# Patient Record
Sex: MALE | Race: WHITE | NOT HISPANIC OR LATINO | Employment: OTHER | URBAN - METROPOLITAN AREA
[De-identification: names, ages, dates, MRNs, and addresses within clinical notes are randomized per-mention and may not be internally consistent; named-entity substitution may affect disease eponyms.]

---

## 2021-04-09 ENCOUNTER — HOSPITAL ENCOUNTER (OUTPATIENT)
Dept: MEDSURG UNIT | Facility: HOSPITAL | Age: 81
End: 2021-04-12
Attending: INTERNAL MEDICINE | Admitting: INTERNAL MEDICINE

## 2021-04-20 ENCOUNTER — HISTORICAL (OUTPATIENT)
Dept: LAB | Facility: HOSPITAL | Age: 81
End: 2021-04-20

## 2022-01-29 ENCOUNTER — HISTORICAL (OUTPATIENT)
Dept: ADMINISTRATIVE | Facility: HOSPITAL | Age: 82
End: 2022-01-29

## 2022-01-29 ENCOUNTER — HOSPITAL ENCOUNTER (OUTPATIENT)
Dept: MEDSURG UNIT | Facility: HOSPITAL | Age: 82
End: 2022-01-30
Attending: INTERNAL MEDICINE | Admitting: INTERNAL MEDICINE

## 2022-01-29 LAB
ABS NEUT (OLG): 6.67 (ref 2.1–9.2)
ALBUMIN SERPL-MCNC: 3.9 G/DL (ref 3.4–4.8)
ALBUMIN/GLOB SERPL: 1.3 {RATIO} (ref 1.1–2)
ALP SERPL-CCNC: 83 U/L (ref 40–150)
ALT SERPL-CCNC: 82 U/L (ref 0–55)
APPEARANCE, UA: CLEAR
APTT PPP: 34.2 S (ref 23.2–33.7)
AST SERPL-CCNC: 56 U/L (ref 5–34)
BACTERIA SPEC CULT: NORMAL
BASOPHILS # BLD AUTO: 0.1 10*3/UL (ref 0–0.2)
BASOPHILS NFR BLD AUTO: 1 %
BILIRUB SERPL-MCNC: 0.7 MG/DL
BILIRUB UR QL STRIP: NEGATIVE
BILIRUBIN DIRECT+TOT PNL SERPL-MCNC: 0.3 (ref 0–0.5)
BILIRUBIN DIRECT+TOT PNL SERPL-MCNC: 0.4 (ref 0–0.8)
BUDDING YEAST: NORMAL
BUN SERPL-MCNC: 26.3 MG/DL (ref 8.4–25.7)
CALCIUM SERPL-MCNC: 9.4 MG/DL (ref 8.7–10.5)
CASTS, UA: NORMAL
CHLORIDE SERPL-SCNC: 90 MMOL/L (ref 98–107)
CO2 SERPL-SCNC: 25 MMOL/L (ref 23–31)
COLOR UR: YELLOW
CREAT SERPL-MCNC: 1.67 MG/DL (ref 0.73–1.18)
CRYSTALS: NORMAL
EOSINOPHIL # BLD AUTO: 0.2 10*3/UL (ref 0–0.9)
EOSINOPHIL NFR BLD AUTO: 2 %
ERYTHROCYTE [DISTWIDTH] IN BLOOD BY AUTOMATED COUNT: 14.6 % (ref 11.5–17)
GLOBULIN SER-MCNC: 3 G/DL (ref 2.4–3.5)
GLUCOSE (UA): NEGATIVE
GLUCOSE SERPL-MCNC: 133 MG/DL (ref 82–115)
HCT VFR BLD AUTO: 38.3 % (ref 42–52)
HEMOLYSIS INTERF INDEX SERPL-ACNC: 18
HGB BLD-MCNC: 12.8 G/DL (ref 14–18)
HGB UR QL STRIP: NEGATIVE
ICTERIC INTERF INDEX SERPL-ACNC: 0
INR PPP: 1.2 (ref 0–1.3)
KETONES UR QL STRIP: NEGATIVE
LEUKOCYTE ESTERASE UR QL STRIP: NEGATIVE
LIPEMIC INTERF INDEX SERPL-ACNC: 3
LYMPHOCYTES # BLD AUTO: 0.8 10*3/UL (ref 0.6–4.6)
LYMPHOCYTES NFR BLD AUTO: 10 %
MANUAL DIFF? (OHS): NO
MCH RBC QN AUTO: 29.5 PG (ref 27–31)
MCHC RBC AUTO-ENTMCNC: 33.4 G/DL (ref 33–36)
MCV RBC AUTO: 88.2 FL (ref 80–94)
MONOCYTES # BLD AUTO: 0.9 10*3/UL (ref 0.1–1.3)
MONOCYTES NFR BLD AUTO: 10 %
NEUTROPHILS # BLD AUTO: 6.67 10*3/UL (ref 2.1–9.2)
NEUTROPHILS NFR BLD AUTO: 77 %
NITRITE UR QL STRIP: NEGATIVE
OSMOLALITY SERPL: 277 MOSM/KG (ref 280–300)
OSMOLALITY UR: 229 MOSM/KG (ref 300–1300)
PH UR STRIP: 7 [PH] (ref 5–9)
PLATELET # BLD AUTO: 270 10*3/UL (ref 130–400)
PMV BLD AUTO: 9.6 FL (ref 9.4–12.4)
POTASSIUM SERPL-SCNC: 3.6 MMOL/L (ref 3.5–5.1)
PROT SERPL-MCNC: 6.9 G/DL (ref 5.8–7.6)
PROT UR QL STRIP: NEGATIVE
PROTHROMBIN TIME: 15.3 S (ref 12.5–14.5)
RBC # BLD AUTO: 4.34 10*6/UL (ref 4.7–6.1)
RBC #/AREA URNS HPF: NORMAL /[HPF] (ref 0–2)
SARS-COV-2 AG RESP QL IA.RAPID: NEGATIVE
SMALL ROUND CELLS, UA: NORMAL
SODIUM SERPL-SCNC: 127 MMOL/L (ref 136–145)
SP GR UR STRIP: 1.01 (ref 1–1.03)
SPERM URNS QL MICRO: NORMAL
SQUAMOUS EPITHELIAL, UA: NORMAL (ref 0–4)
TROPONIN I SERPL-MCNC: 0.01 NG/ML (ref 0–0.04)
UROBILINOGEN UR STRIP-ACNC: 0.2
WBC # SPEC AUTO: 8.7 10*3/UL (ref 4.5–11.5)
WBC #/AREA URNS HPF: NORMAL /[HPF] (ref 0–2)

## 2022-01-30 LAB
ABS NEUT (OLG): 4.74 (ref 2.1–9.2)
ALBUMIN SERPL-MCNC: 3.2 G/DL (ref 3.4–4.8)
ALBUMIN/GLOB SERPL: 1.1 {RATIO} (ref 1.1–2)
ALP SERPL-CCNC: 64 U/L (ref 40–150)
ALT SERPL-CCNC: 76 U/L (ref 0–55)
AST SERPL-CCNC: 57 U/L (ref 5–34)
BASOPHILS # BLD AUTO: 0 10*3/UL (ref 0–0.2)
BASOPHILS NFR BLD AUTO: 1 %
BILIRUB SERPL-MCNC: 0.6 MG/DL
BILIRUBIN DIRECT+TOT PNL SERPL-MCNC: 0.2 (ref 0–0.5)
BILIRUBIN DIRECT+TOT PNL SERPL-MCNC: 0.4 (ref 0–0.8)
BUN SERPL-MCNC: 18.9 MG/DL (ref 8.4–25.7)
CALCIUM SERPL-MCNC: 8.6 MG/DL (ref 8.7–10.5)
CHLORIDE SERPL-SCNC: 96 MMOL/L (ref 98–107)
CO2 SERPL-SCNC: 24 MMOL/L (ref 23–31)
CREAT SERPL-MCNC: 0.95 MG/DL (ref 0.73–1.18)
EOSINOPHIL # BLD AUTO: 0.1 10*3/UL (ref 0–0.9)
EOSINOPHIL NFR BLD AUTO: 2 %
ERYTHROCYTE [DISTWIDTH] IN BLOOD BY AUTOMATED COUNT: 14.8 % (ref 11.5–17)
GLOBULIN SER-MCNC: 3 G/DL (ref 2.4–3.5)
GLUCOSE SERPL-MCNC: 102 MG/DL (ref 82–115)
HCT VFR BLD AUTO: 34.8 % (ref 42–52)
HEMOLYSIS INTERF INDEX SERPL-ACNC: 152
HGB BLD-MCNC: 11.6 G/DL (ref 14–18)
ICTERIC INTERF INDEX SERPL-ACNC: 0
LIPEMIC INTERF INDEX SERPL-ACNC: 5
LYMPHOCYTES # BLD AUTO: 0.8 10*3/UL (ref 0.6–4.6)
LYMPHOCYTES NFR BLD AUTO: 13 %
MANUAL DIFF? (OHS): NO
MCH RBC QN AUTO: 29.6 PG (ref 27–31)
MCHC RBC AUTO-ENTMCNC: 33.3 G/DL (ref 33–36)
MCV RBC AUTO: 88.8 FL (ref 80–94)
MONOCYTES # BLD AUTO: 0.8 10*3/UL (ref 0.1–1.3)
MONOCYTES NFR BLD AUTO: 12 %
NEUTROPHILS # BLD AUTO: 4.74 10*3/UL (ref 2.1–9.2)
NEUTROPHILS NFR BLD AUTO: 72 %
PLATELET # BLD AUTO: 215 10*3/UL (ref 130–400)
PMV BLD AUTO: 9.6 FL (ref 9.4–12.4)
POTASSIUM SERPL-SCNC: 3.8 MMOL/L (ref 3.5–5.1)
PROT SERPL-MCNC: 6.2 G/DL (ref 5.8–7.6)
RBC # BLD AUTO: 3.92 10*6/UL (ref 4.7–6.1)
SODIUM SERPL-SCNC: 130 MMOL/L (ref 136–145)
WBC # SPEC AUTO: 6.6 10*3/UL (ref 4.5–11.5)

## 2022-03-10 ENCOUNTER — HISTORICAL (OUTPATIENT)
Dept: ADMINISTRATIVE | Facility: HOSPITAL | Age: 82
End: 2022-03-10

## 2022-03-10 LAB
ALBUMIN SERPL-MCNC: 3.5 G/DL (ref 3.4–4.8)
ALBUMIN/GLOB SERPL: 1.2 {RATIO} (ref 1.1–2)
ALP SERPL-CCNC: 83 U/L (ref 40–150)
ALT SERPL-CCNC: 40 U/L (ref 0–55)
AST SERPL-CCNC: 28 U/L (ref 5–34)
BILIRUB SERPL-MCNC: 0.6 MG/DL
BILIRUBIN DIRECT+TOT PNL SERPL-MCNC: 0.3 (ref 0–0.5)
BILIRUBIN DIRECT+TOT PNL SERPL-MCNC: 0.3 (ref 0–0.8)
BUN SERPL-MCNC: 10.9 MG/DL (ref 8.4–25.7)
CALCIUM SERPL-MCNC: 9.5 MG/DL (ref 8.7–10.5)
CHLORIDE SERPL-SCNC: 99 MMOL/L (ref 98–107)
CHOLEST SERPL-MCNC: 163 MG/DL
CHOLEST/HDLC SERPL: 3 {RATIO} (ref 0–5)
CO2 SERPL-SCNC: 27 MMOL/L (ref 23–31)
CREAT SERPL-MCNC: 0.89 MG/DL (ref 0.73–1.18)
ERYTHROCYTE [DISTWIDTH] IN BLOOD BY AUTOMATED COUNT: 16 % (ref 11.5–17)
GLOBULIN SER-MCNC: 2.8 G/DL (ref 2.4–3.5)
GLUCOSE SERPL-MCNC: 102 MG/DL (ref 82–115)
HCT VFR BLD AUTO: 35.7 % (ref 42–52)
HDLC SERPL-MCNC: 57 MG/DL (ref 35–60)
HEMOLYSIS INTERF INDEX SERPL-ACNC: 1
HEMOLYSIS INTERF INDEX SERPL-ACNC: 1
HGB BLD-MCNC: 12 G/DL (ref 14–18)
ICTERIC INTERF INDEX SERPL-ACNC: 1
INR PPP: 1.1 (ref 0–1.3)
LDLC SERPL CALC-MCNC: 96 MG/DL (ref 50–140)
LIPEMIC INTERF INDEX SERPL-ACNC: <0
MAGNESIUM SERPL-MCNC: 1.8 MG/DL (ref 1.6–2.6)
MCH RBC QN AUTO: 30.2 PG (ref 27–31)
MCHC RBC AUTO-ENTMCNC: 33.6 G/DL (ref 33–36)
MCV RBC AUTO: 89.9 FL (ref 80–94)
PLATELET # BLD AUTO: 257 10*3/UL (ref 130–400)
PMV BLD AUTO: 10.3 FL (ref 9.4–12.4)
POTASSIUM SERPL-SCNC: 4.2 MMOL/L (ref 3.5–5.1)
PROT SERPL-MCNC: 6.3 G/DL (ref 5.8–7.6)
PROTHROMBIN TIME: 13.6 S (ref 12.5–14.5)
RBC # BLD AUTO: 3.97 10*6/UL (ref 4.7–6.1)
SODIUM SERPL-SCNC: 136 MMOL/L (ref 136–145)
TRIGL SERPL-MCNC: 49 MG/DL (ref 34–140)
TSH SERPL-ACNC: 2.58 M[IU]/L (ref 0.35–4.94)
VLDLC SERPL CALC-MCNC: 10 MG/DL
WBC # SPEC AUTO: 6.8 10*3/UL (ref 4.5–11.5)

## 2022-03-11 ENCOUNTER — HISTORICAL (OUTPATIENT)
Dept: CARDIOLOGY | Facility: HOSPITAL | Age: 82
End: 2022-03-11

## 2022-04-07 ENCOUNTER — HISTORICAL (OUTPATIENT)
Dept: ADMINISTRATIVE | Facility: HOSPITAL | Age: 82
End: 2022-04-07
Payer: MEDICARE

## 2022-04-24 VITALS
HEIGHT: 67 IN | OXYGEN SATURATION: 96 % | WEIGHT: 187.56 LBS | BODY MASS INDEX: 29.44 KG/M2 | SYSTOLIC BLOOD PRESSURE: 172 MMHG | DIASTOLIC BLOOD PRESSURE: 67 MMHG

## 2022-04-29 NOTE — OP NOTE
DATE OF SURGERY:        SURGEON:  Rashard Serrato MD    PROCEDURE PERFORMED:  Limited transesophageal echocardiogram with direct current cardioversion.    PREOPERATIVE DIAGNOSIS:  Atrial fibrillation.    POSTOPERATIVE DIAGNOSIS:  Sinus rhythm.    PROCEDURAL INDICATION:  Poorly-tolerated atrial fibrillation.    PROCEDURE IN DETAIL:  The patient was taken to the postop recovery area and sedated under the supervision of Anesthesiology.  A WISAM probe was inserted into the esophagus, and a limited transesophageal echocardiogram was performed.  The left atrial appendage was well visualized with no evidence of thrombus formation.  There was mild spontaneous echo contrast within the left atrium.  The WISAM probe was removed and 200 joules of synchronous energy was delivered with prompt return of sinus rhythm.  There were no procedural complications, and the patient was scheduled for discharge upon recovery.        ______________________________  Rashard Serrato MD    AI/UR  DD:  04/12/2021  Time:  01:29PM  DT:  04/12/2021  Time:  01:40PM  Job #:  [id number]

## 2022-04-29 NOTE — H&P
Patient:   Garrison Garrett            MRN: [id number]            FIN: [id number]               Age:   80 years     Sex:  Male     :  1940   Associated Diagnoses:   None   Author:   Rashard Serrato MD      Basic Information   Source of history:  Self, Medical record.    Referral source:  Emergency department.    History limitation:  None.    Resusitation Status:  Full Code.       Chief Complaint   Shortness of Breath      History of Present Illness   Mr. Garrett is a 80 year old male, known to Dr. Fontanez, who presented to the ED with reports of shortness of breath. Patient reports 2-week history of worsening shortness of breath both at rest and with exertion. He reports intermittent PND and sleeps on one pillow nightly. Denies CP. CXR revealed pronounced pulmonary vasculature and small right pleural effusion. BNP noted to be 210. Initial troponin value is negative. Patient noted to have newly diagnosed AFIB presumptively contributing to his symptoms. He is scheduled for WISAM/DCCV with Dr. Layne on 4.15.21. He is admitted to [facility] for further workup of his symptoms.    PMH: Paroxysmal Atrial Fibrillation (New Diagnosis) (Eliquis), CAD, HTN, DLD, Former Cigar Smoker, ETOH Use (6-8 Beers Daily), Hard of Hearing  PSH: Sinus Surgery, LHC  FH: Mother- CVA/MI, Sister- MI  SH: Tobacco- Former Cigar Smoker, ETOH- 6-8 Beers Daily, Substance Abuse- Negative    Studies:  Cardiac PET (3.30.21):  This is probably a normal perfusion study.   This scan is suggestive of low risk for future cardiovascular events.   Small partially reversible perfusion abnormality of moderate intensity in the apical inferior segment. This could represent an area of attenuation. Clinical correlation is recommended.   The left ventricular cavity is noted to be normal on the stress studies. The stress left ventricular ejection fraction was calculated to be 69% and left ventricular global function is normal. The rest left  ventricular cavity is noted to be normal. The rest left ventricular ejection fraction was calculated to be 67% and rest left ventricular global function is normal.   When compared to the resting ejection fraction (67%), the stress ejection fraction (69%) has increased.   The study quality is good.      TTE (3.29.21):  The left ventricle is normal in size. Global left ventricular systolic function is normal. The left ventricular ejection fraction is 50%. Left ventricular diastolic function is indeterminate.   Mild to moderate (1-2+) mitral regurgitation.   Mild to moderate (1-2+) tricuspid regurgitation.   Mild (1+) aortic regurgitation.   Mild (1+) pulmonic regurgitation.   Aortic root diameter is 4.0 cms.  The pulmonary artery systolic pressure is 42 mmHg. Evidence of pulmonary hypertension is noted.      LHC (9.19.14):  LM- Patent Calcified Short Vessel, LAD- 30-40% Mid, LCx- Small 90% OM1 Branch, RCA- Normal (Non-Dominant)      Review of Systems   Constitutional:  Negative.    Eye:  Negative.    Ear/Nose/Mouth/Throat:  Negative.    Respiratory:  Shortness of breath, MARRERO.    Cardiovascular:  No chest pain.    Gastrointestinal:  No H/O GI Bleed.    Genitourinary:  Negative.    Hematology/Lymphatics:  Negative.    Endocrine:  Negative.    Immunologic:  Negative.    Musculoskeletal:  Negative.    Integumentary:  Negative.    Neurologic:  Alert and oriented X4.    Psychiatric:  Negative.       Health Status   Allergies:    Allergic Reactions (Selected)  Severity Not Documented  Hydrochlorothiazide- No reactions were documented.,    Allergies (1) Active Reaction  hydrochlorothiazide None Documented     Current medications:  (Selected)   Inpatient Medications  Ordered  Benadryl: 25 mg, form: Cap, Oral, Once a day (at bedtime) PRN for insomnia, first dose 04/09/21 15:20:00 CDT, STAT  Benadryl: 25 mg, form: Cap, Oral, q4hr PRN for itching, first dose 04/09/21 15:20:00 CDT, STAT  Lasix: 40 mg, form: Injection, IV Push,  Once, first dose 04/09/21 15:05:00 CDT, stop date 04/09/21 15:05:00 CDT, STAT  Lasix: 40 mg, form: Injection, IV Push, Once, first dose 04/09/21 15:18:00 CDT, stop date 04/09/21 15:18:00 CDT, STAT  Maalox Antacid with Anti-Gas: 30 mL, form: Susp, Oral, q4hr PRN for dyspepsia, first dose 04/09/21 15:20:00 CDT  Milk of Magnesia: 30 mL, form: Susp, Oral, Daily PRN for constipation, first dose 04/09/21 15:20:00 CDT  Norco  5 mg-325 mg oral tablet: 1 tab(s), form: Tab, Oral, q4hr PRN for pain, moderate, first dose 04/09/21 15:20:00 CDT  Norco  5 mg-325 mg oral tablet: 2 tab(s), form: Tab, Oral, q4hr PRN for pain, severe, first dose 04/09/21 15:20:00 CDT  Senokot S: 1 tab(s), form: Tab, Oral, BID PRN for constipation, first dose 04/09/21 15:20:00 CDT, choose only if unrelieved by Milk of Magnesia or choose first if ordered alone  Tylenol: 1,000 mg, form: Tab, Oral, q6hr PRN for pain, mild, first dose 04/09/21 15:20:00 CDT, STAT, or fever; No more than 4 grams in 24 hours  Tylenol: 650 mg, form: Supp, IN, q6hr PRN for pain, first dose 04/09/21 15:20:00 CDT, STAT, mild or fever if patient unable to swallow; No more than 4 grams in 24 hours  Xanax: 0.25 mg, form: Tab, Oral, q6hr PRN for anxiety, first dose 04/09/21 15:20:00 CDT, STAT  Xylocaine Jelly 2% topical gel with applicator: 5 mL, form: Gel, TOP, Once PRN for other (see comment), first dose 04/09/21 15:20:00 CDT, for insertion of urinary catheter in males  hydrALAZINE: 10 mg, form: Injection, IV Push, q2hr PRN for hypertension, first dose 04/09/21 15:20:00 CDT, STAT, SBP > 160mmHg or DBP > 100 mmHg, if HR < 60 or when BP does not respond to Labetolol  labetalol: 10 mg, form: Soln, IV Push, q1hr PRN for hypertension, first dose 04/09/21 15:20:00 CDT, STAT, SBP > 160mmHg or DBP > 110 mmHg, may repeat hourly as necessary if HR > 60  nitroglycerin: 0.4 mg, form: Tab-SL, SL, q5min PRN for chest pain, Order duration: 3 dose(s), first dose 04/09/21 15:20:00 CDT, stop date  Limited # of times, STAT, if systolic BP > 100 until pain relieved of at level 1  Pending Complete  Mydriacyl 1% ophthalmic solution 3ml: 1 drop(s), form: Soln-Opth, Eye-Right, q5min, Order duration: 5 dose(s), first dose 11/13/12 14:50:00 CST, stop date 11/13/12 15:14:00 CST  bupivacaine 0.75% PF for OPHTH: 0.472 mg = 0.06 mL, form: Injection, Eye-Right, q5min, Order duration: 5 dose(s), first dose 11/13/12 14:50:00 CST, stop date 11/13/12 15:14:00 CST, 0.427mg=1 drop  gatifloxacin 0.5% ophthalmic solution: 1 drop(s), form: Soln-Opth, Eye-Right, q5min, Order duration: 4 dose(s), first dose 11/13/12 14:50:00 CST, stop date 11/13/12 15:09:00 CST  phenylephrine ophthalmic 2.5% solution: 1 drop(s), form: Soln-Opth, Eye-Right, q5min, Order duration: 5 dose(s), first dose 11/13/12 14:50:00 CST, stop date 11/13/12 15:14:00 CST  Prescriptions  Prescribed  Bromday 0.09% ophthalmic solution: 1 drop(s), Eye-Right, Daily, # 1 mL, 0 Refill(s), other reason (Rx)  Pred Forte 1% ophthalmic suspension: 1 drop(s), Eye-Right, QID, # 5 mL, 0 Refill(s), other reason (Rx)  Zymaxid 0.5% ophthalmic solution: 1 drop(s), Eye-Right, BID, # 2 mL, 0 Refill(s), other reason (Rx)  carvedilol 6.25 mg oral tablet: 6.25 mg = 1 tab(s), Oral, BID, # 60 tab(s), 2 Refill(s), Pharmacy: [facility]  Documented Medications  Documented  Centrum Silver oral tablet: 1 tab(s), Oral, Daily, # 30 tab(s), 0 Refill(s)  amlodipine 5 mg oral tablet: Daily, 0 Refill(s)  aspirin 81 mg oral tablet, CHEWABLE: 81 mg = 1 tab(s), Oral, Daily, # 30 tab(s), 0 Refill(s)  fluticasone CFC free 50 mcg/inh inhalation aerosol with adapter: INH, Daily, 0 Refill(s)  furosemide 40 mg oral tablet: Daily, 0 Refill(s)  pravastatin 80 mg oral tablet: Daily, 0 Refill(s)  ramipril 10 mg oral capsule: Daily, 0 Refill(s),    Medications (16) Active  Scheduled: (2)  furosemide 10 mg/ml Inj - 4mL  40 mg 4 mL, IV Push, Once  furosemide 10 mg/ml Inj - 4mL  40 mg 4 mL, IV Push,  Once  Continuous: (0)  PRN: (14)  acetaminophen 500 mg Tab  1,000 mg 2 tab(s), Oral, q6hr  acetaminophen 650 mg Sup UD  650 mg 1 supp, HI, q6hr  Al hydrox/Mg hydrox/simeth -400-40 mg/5 mL Ana UD  30 mL, Oral, q4hr  alprazolam 0.25 mg Tab UD  0.25 mg 1 tab(s), Oral, q6hr  diphenhydrAMINE 25 mg Cap UD  25 mg 1 cap(s), Oral, q4hr  diphenhydrAMINE 25 mg Cap UD  25 mg 1 cap(s), Oral, Once a day (at bedtime)  docusate-senna 50mg -8.6mg Tab UD  1 tab(s), Oral, BID  hydrALAzine 20 mg/ml Inj- 1 mL  10 mg 0.5 mL, IV Push, q2hr  hydrocodone 5mg/APAP 325 mg  1 tab(s), Oral, q4hr  hydrocodone 5mg/APAP 325 mg  2 tab(s), Oral, q4hr  labetalol 5 mg/mL 20mL vial  10 mg 2 mL, IV Push, q1hr  lidocaine topical 2% Gel (UROJET)  5 mL, TOP, Once  magnesium hydroxide 8% Ana (MOM) UD  30 mL, Oral, Daily  Nitroglycerin 0.4 mg TAB  0.4 mg 1 tab(s), SL, q5min        Physical Examination   General:  Alert and oriented, No acute distress.    Eye:  Normal conjunctiva.    HENT:  Normocephalic.    Neck:  Supple.    Respiratory:  Respirations are non-labored, NC 2 L/Min NC.         Pattern: Regular.         Breath sounds: Bilateral, Posterior, Diminished.    Cardiovascular:  Normal rate, Irregularly irregular rhythm.    Gastrointestinal:  Non-tender.       Vital Signs (last 24 hrs)_____  Last Charted___________  Temp Oral     36.6 DegC  (APR 09 12:13)  Heart Rate Peripheral   77 bpm  (APR 09 15:22)  Resp Rate         22 br/min  (APR 09 15:22)  SBP      H 146mmHg  (APR 09 15:22)  DBP      87 mmHg  (APR 09 15:22)  SpO2      94 %  (APR 09 15:22)     Integumentary:  Warm, Dry.    Neurologic:  Alert, Oriented.    Psychiatric:  Cooperative, Appropriate mood & affect.       Review / Management   Laboratory Results   Today's Lab Results : PowerNote Discrete Results   4/9/2021 13:43 CDT       Est Creat Clearance Ser   63.09 mL/min    4/9/2021 12:55 CDT       WBC                       6.0 x10(3)/mcL                             RBC                        4.20 x10(6)/mcL  LOW                             Hgb                       13.0 gm/dL  LOW                             Hct                       38.2 %  LOW                             Platelet                  250 x10(3)/mcL                             PT                        18.7 second(s)  HI                             INR                       1.6  HI                             PTT                       42.0 second(s)  HI                             Sodium Lvl                127 mmol/L  LOW                             Potassium Lvl             4.2 mmol/L                             Chloride                  90 mmol/L  LOW                             CO2                       23 mmol/L                             Calcium Lvl               9.0 mg/dL                             Glucose Lvl               110 mg/dL                             BUN                       12.1 mg/dL                             Creatinine                0.90 mg/dL                             eGFR-AA                   >60  NA                             eGFR-RAINER                  >60 mL/min/1.73 m2  NA                             Bili Total                0.9 mg/dL                             Bili Direct               0.4 mg/dL                             Bili Indirect             0.50 mg/dL                             AST                       22 unit/L                             ALT                       17 unit/L                             Alk Phos                  57 unit/L                             Total Protein             7.1 gm/dL                             Albumin Lvl               4.1 gm/dL                             Globulin                  3.0 gm/dL                             A/G Ratio                 1.4 ratio                             BNP                       210.8 pg/mL  HI                             Troponin-I                <0.010 ng/mL        Cardiac Monitor:  At  4/9/2021 16:01:00, Rate  78 beats per  minute, Reveals an Atrial fibrillation.    Condition:  Stable.       Impression and Plan   Impression:  PAF (Now AF CVR) (Symptomatic)    - On Eliquis    - EF 50%  Acute Heart Failure with Preserved EF    - Small Right Pleural Effusion  CAD- LAD- 40-50% Mid, OM- 90% Branch Disease (2014) (Medical Management)    - PET (3/2021): Low Risk Study/Partial Reversibility Apical Inferior Segment  HTN  PHTN  HLD  Anemia (Stable)  Hyponatremia  No History of GI Bleed    Plan:  Lasix 40 Mg IVP x 2 Doses  Resume Home Medications; Continue Eliquis 5 Mg PO BID  Ensure Accurate I/O; Daily Weights  Labs in AM (BMP, Mag Level)

## 2022-04-29 NOTE — ED PROVIDER NOTES
Patient:   Garrison Garrett            MRN: [id number]            FIN: [id number]               Age:   80 years     Sex:  Male     :  1940   Associated Diagnoses:   A-fib; Benign essential HTN; Shortness of breath on exertion; Acute respiratory failure with hypoxia   Author:   Isabel Gallegos MD      Basic Information   Time seen: Date & time 2021 12:06:00.   History source: Patient.   Arrival mode: Private vehicle, walking.   History limitation: None.   Additional information: Patient's physician(s): Wilder Fontanez MD (cardiologist).      History of Present Illness   The patient presents with 79 yo male presents with shortness of breath worsening over the past 2 days. Complains of bilateral leg swelling. Hx of afib scheduled for cardioversion next week. DHAVAL Little and MARIAM, Dr. Gallegos, assumed care of this patient at 1502.    81 y/o CM with history of HTN, AFib and HLD presents to ED for worsening SOB over the past week.  Pt also reports LE swelling and cough.  Pt says he is scheduled for cardioversion next week, and says his cardiologist told him he has an irregular rhythm..  The onset was 1  weeks ago.  The course/duration of symptoms is worsening.  Degree at onset mild.  Degree at present moderate.  The Exacerbating factors is none.  The Relieving factors is none.  Risk factors consist of hypertension.  Prior episodes: none.  Therapy today: none.  Associated symptoms: cough, denies chest pain and denies fever.        Review of Systems   Constitutional symptoms:  No fever, no chills, no sweats   Skin symptoms:  No rash, no petechiae.    Eye symptoms:  Vision unchangedNo pain, no discharge, no icterus, no diplopia, no blurred vision, no blindness.    ENMT symptoms:  No ear pain, no sore throat, no nasal congestion, no sinus painThroat: no difficulty swallowing.   Respiratory symptoms:  Shortness of breath, coughNo orthopnea, no hemoptysis, no stridor, no wheezing.    Cardiovascular  symptoms:  Peripheral edemaNo chest pain, no palpitations, no syncope, no diaphoresis.    Gastrointestinal symptoms:  No abdominal pain, no nausea, no vomiting, no diarrhea, no constipation, no rectal bleeding, no rectal pain.    Genitourinary symptoms:  No dysuria, no hematuria.    Musculoskeletal symptoms:  No back pain, no Muscle pain   Neurologic symptoms:  No headache, no dizziness, no altered level of consciousness, no numbness, no tingling.    Psychiatric symptoms:  Negative except as documented in HPI   Endocrine symptoms:  Negative except as documented in HPI.   Hematologic/Lymphatic symptoms:  Negative except as documented in HPI      Health Status   Allergies:    Allergic Reactions (Selected)  Severity Not Documented  Hydrochlorothiazide- No reactions were documented.,    Allergies (1) Active Reaction  hydrochlorothiazide None Documented  .   Medications:  (Selected)   Inpatient Medications  Pending Complete  Mydriacyl 1% ophthalmic solution 3ml: 1 drop(s), form: Soln-Opth, Eye-Right, q5min, Order duration: 5 dose(s), first dose 11/13/12 14:50:00 CST, stop date 11/13/12 15:14:00 CST  bupivacaine 0.75% PF for OPHTH: 0.472 mg = 0.06 mL, form: Injection, Eye-Right, q5min, Order duration: 5 dose(s), first dose 11/13/12 14:50:00 CST, stop date 11/13/12 15:14:00 CST, 0.427mg=1 drop  gatifloxacin 0.5% ophthalmic solution: 1 drop(s), form: Soln-Opth, Eye-Right, q5min, Order duration: 4 dose(s), first dose 11/13/12 14:50:00 CST, stop date 11/13/12 15:09:00 CST  phenylephrine ophthalmic 2.5% solution: 1 drop(s), form: Soln-Opth, Eye-Right, q5min, Order duration: 5 dose(s), first dose 11/13/12 14:50:00 CST, stop date 11/13/12 15:14:00 CST  Prescriptions  Prescribed  Bromday 0.09% ophthalmic solution: 1 drop(s), Eye-Right, Daily, # 1 mL, 0 Refill(s), other reason (Rx)  Pred Forte 1% ophthalmic suspension: 1 drop(s), Eye-Right, QID, # 5 mL, 0 Refill(s), other reason (Rx)  Zymaxid 0.5% ophthalmic solution: 1 drop(s),  Eye-Right, BID, # 2 mL, 0 Refill(s), other reason (Rx)  carvedilol 6.25 mg oral tablet: 6.25 mg = 1 tab(s), Oral, BID, # 60 tab(s), 2 Refill(s), Pharmacy: [facility]  Documented Medications  Documented  Centrum Silver oral tablet: 1 tab(s), Oral, Daily, # 30 tab(s), 0 Refill(s)  amlodipine 5 mg oral tablet: Daily, 0 Refill(s)  aspirin 81 mg oral tablet, CHEWABLE: 81 mg = 1 tab(s), Oral, Daily, # 30 tab(s), 0 Refill(s)  fluticasone CFC free 50 mcg/inh inhalation aerosol with adapter: INH, Daily, 0 Refill(s)  furosemide 40 mg oral tablet: Daily, 0 Refill(s)  pravastatin 80 mg oral tablet: Daily, 0 Refill(s)  ramipril 10 mg oral capsule: Daily, 0 Refill(s), per nurse's notes.      Past Medical/ Family/ Social History   Medical history:    Active  Bilateral cataracts (198511300)  Hypertension (61122171)  High cholesterol ([id number])  Arthritis (5711375)  Comments:  11/11/2012 CST 19:33 Alberta Andre RN  left arm  Recurrent sinus infections ([id number])  Bilateral hearing loss ([id number])  Comments:  11/11/2012 CST 19:34 Alberta Andre RN  hearing aides  Bruises easily ([id number])  Resolved  Glasses ([id number]):  Resolved.  Seasonal allergies ([id number]):  Resolved.  Hearing aid (54719288):  Resolved.  Able to lie down (616399401):  Resolved.  Activity tolerance ([id number]):  Resolved.  Chronic back pain ([id number]):  Resolved.  Kidney (012715930):  Resolved.  Comments:  2/18/2015 CST 6:58 CST - Misael SCHULZ, Caren RUTHSp  born with 1 kidney.   Surgical history:    43803 - LT CATARACT W/IOL 1 STA PHACO (Left) on 2/18/2015 at 74 Years.  Comments:  2/18/2015 7:50 CST - Yamilka SCHULZ, Akilah MARIO  auto-populated from documented surgical case  ORIF right elbow.  Comments:  11/11/2012 19:38 CST - Gabriela SCHULZ , Alberta Blevins  2011  sinus surgery.  Comments:  11/13/2012 14:52 CST - Jayesh SCHULZ ,  "Fozia M.  15 years ago  Cataract (366.9).  Comments:  2/18/2015 6:49 JAYLIN - Misael SCHULZ, Caren LEW  right eye-2 years ago  angiogram..   Family history:    Entire family history is negative..   Social history:    Social & Psychosocial Habits    Alcohol  11/11/2012  Use: Current    Type: Beer    Frequency: Daily    01/28/2021  Use: Current    Type: Beer    Frequency: Daily    Employment/School  11/11/2012  Status: Employed    Description:     Highest education: High school    01/28/2021  Status: Employed    Exercise    Comment: "I walk a lot, but not during winter" - 01/28/2021 10:53 - Marychuy Garrett LPN    Home/Environment  11/11/2012  Lives with: Spouse    01/28/2021  Lives with: Spouse    Nutrition/Health  11/11/2012  Type of diet: Regular    Substance Use  11/11/2012 Risk Assessment: Denies Substance Abuse    01/28/2021  Use: Never    Tobacco  11/11/2012  Use: Past    Type: Cigars    Comment: quit 20 years ago - 11/11/2012 19:40 - Gabriela SCHULZ, Alberta Blevins    04/08/2021  Use: Former smoker, quit more    Patient Wants Consult For Cessation Counseling No    Comment: "years ago" - 01/28/2021 10:54 - Marychuy Garrett LPN    Abuse/Neglect  04/08/2021  SHX Any signs of abuse or neglect No    Feels unsafe at home: No    Safe place to go: Yes    Spiritual/Cultural  04/08/2021  Christian Preference Nondenominational  , Alcohol use: Regularly, Tobacco use: former, Drug use: Denies.      Physical Examination               Vital Signs   Vital Signs   4/9/2021 15:08 CDT       Peripheral Pulse Rate     77 bpm                             Heart Rate Monitored      76 bpm                             Respiratory Rate          20 br/min                             SpO2                      90 %  LOW                             Systolic Blood Pressure   144 mmHg  HI                             Diastolic Blood Pressure  93 mmHg  HI                             Mean Arterial Pressure, Cuff              110 mmHg    4/9/2021 " 12:13 CDT       Temperature Oral          36.6 DegC                             Temperature Oral (calculated)             97.88 DegF                             Peripheral Pulse Rate     74 bpm                             Respiratory Rate          20 br/min                             SpO2                      95 %                             Oxygen Therapy            Room air                             Systolic Blood Pressure   137 mmHg                             Diastolic Blood Pressure  68 mmHg  .   Measurements   4/9/2021 12:13 CDT       Weight Dosing             90.5 kg                             Weight Measured and Calculated in Lbs     199.52 lb                             Weight Estimated          90.5 kg                             Height/Length Dosing      170 cm                             Height/Length Estimated   170 cm                             Body Mass Index Estimated 31.31 kg/m2  .   Basic Oxygen Information   4/9/2021 15:08 CDT       SpO2                      90 %  LOW    4/9/2021 12:13 CDT       SpO2                      95 %                             Oxygen Therapy            Room air  .   General:  Alert, no acute distress   Skin:  Warm, dry, intact   Head:  Normocephalic, atraumatic   Neck:  Supple, trachea midline, no tenderness   Eye:  Pupils are equal, round and reactive to light, extraocular movements are intact   Cardiovascular:  No murmur, Normal peripheral perfusion, Irregular rhythm, Edema: Lower extremity, 2+, Edema: Abdominal, slight.    Respiratory:  Respirations are non-labored, Breath sounds: Bilateral, base(s), crackles present.    Gastrointestinal:  Soft, Nontender   Back:  Nontender, Normal range of motion.    Musculoskeletal:  Normal ROM, normal strength.    Neurological:  Alert and oriented to person, place, time, and situation, No focal neurological deficit observed   Psychiatric:  Cooperative, appropriate mood & affect      Medical Decision Making   Differential  Diagnosis:  Pneumonia, bronchitis, congestive heart failure, pulmonary edema, pleural effusion, acute myocardial infarction, upper respiratory infection.    Rationale:  pt with progressive dyspnea on exertion, le and new onset a fib now requiring supplemental O2, s/s and workup consistent with acute chf, admit for diuresis, further management.   Documents reviewed:  Emergency department nurses' notes.   Orders  Launch Orders   Laboratory:  PTT (Order): Stat collect, 2021 12:08 CDT, Blood, Lab Collect, Print Label By Order Location, 2021 12:08 CDT  PT (Order): Stat collect, 2021 12:08 CDT, Blood, Lab Collect, Print Label By Order Location, 2021 12:08 CDT  BNP (Order): Stat collect, 2021 12:07 CDT, Blood, Lab Collect, Print Label By Order Location, 2021 12:07 CDT  POC iSTAT ER Troponin request (Order): Blood, Stat collect, 2021 12:07 CDT, Lab Collect, Print Label By Order Location  Troponin-I (Order): Stat collect, 2021 12:07 CDT, Blood, Lab Collect, Print Label By Order Location, 2021 12:07 CDT  CMP (Order): Stat collect, 2021 12:07 CDT, Blood, Lab Collect, Print Label By Order Location, 2021 12:07 CDT  CBC w/ Auto Diff (Order): Now collect, 2021 12:07 CDT, Blood, Lab Collect, Print Label By Order Location, 2021 12:07 CDT  Radiology:  XR Chest 1 View (Order): Stat, 2021 12:08 CDT, Chest Pain, None, Stretcher, Patient Has IV?, Rad Type, Not Scheduled  Cardiology:  EKG (Order): 2021 12:08 CDT, Stat, Once, 2021 12:08 CDT, Stretcher, Patient Has IV, Standard Precautions, -1, -1, 2021 12:08 CDT, launch Order Profile (Selected)   Inpatient Orders  Ordered  EK21 12:08:00 CDT, Stat, Once, 21 12:08:00 CDT, Stretcher, Patient Has IV, Standard Precautions, -1, -1, 21 12:08:00 CDT  Lasix: 40 mg, form: Injection, IV Push, Once, first dose 21 15:05:00 CDT, stop date 21 15:05:00 CDT, STAT  Patient Isolation: 21 14:58:43 CDT,  Contact Precautions, Constant Indicator  Patient Isolation: 04/09/21 14:58:43 CDT, Droplet Precautions, Constant Indicator  Ordered (Collected)  POC iSTAT ER Troponin request: BLOOD, STAT collect, Collected, 04/09/21 12:55:03 CDT, Stop date 04/09/21 12:55:00 CDT, Lab Collect, Print Label By Order Location  Ordered (Dispatched)  COVID-19 IDnow: Now collect, Nasal, 04/09/21 15:07:00 CDT, Stop date 04/09/21 15:07:00 CDT, Nurse collect  Magnesium Level: Stat collect, 04/09/21 15:05:00 CDT, Blood, Stop date 04/09/21 15:05:00 CDT, Lab Collect, Print Label By Order Location, 04/09/21 15:05:00 CDT  Completed  Automated Diff: NOW collect, 04/09/21 12:55:00 CDT, Blood, Collected, Stop date 04/09/21 12:55:00 CDT, Lab Collect, Print Label By Order Location, 04/09/21 12:07:00 CDT  BNP: STAT collect, 04/09/21 12:55:03 CDT, BLOOD, Collected, Stop date 04/09/21 12:55:00 CDT, Lab Collect  CBC w/ Auto Diff: NOW collect, 04/09/21 12:55:03 CDT, BLOOD, Collected, Stop date 04/09/21 12:55:00 CDT, Lab Collect  CMP: STAT collect, 04/09/21 12:55:03 CDT, BLOOD, Collected, Stop date 04/09/21 12:55:00 CDT, Lab Collect  Estimated Glomerular Filtration Rate: STAT collect, 04/09/21 12:55:00 CDT, Blood, Collected, Stop date 04/09/21 12:55:00 CDT, Lab Collect, Print Label By Order Location, 04/09/21 12:07:00 CDT  PT: STAT collect, 04/09/21 12:55:03 CDT, BLOOD, Collected, Stop date 04/09/21 12:55:00 CDT, Lab Collect  PTT: STAT collect, 04/09/21 12:55:03 CDT, BLOOD, Collected, Stop date 04/09/21 12:55:00 CDT, Lab Collect  Troponin-I: STAT collect, 04/09/21 12:55:03 CDT, BLOOD, Collected, Stop date 04/09/21 12:55:00 CDT, Lab Collect  XR Chest 1 View: Stat, 04/09/21 12:08:00 CDT, Chest Pain, None, Stretcher, Patient Has IV?, Rad Type, Not Scheduled, 04/09/21 12:08:00 CDT  Discontinued  SARS-CoV-2 by PCR: Stat collect, Nasal, 04/09/21 14:58:00 CDT, Stop date 04/09/21 14:58:00 CDT, Nurse collect  .    Electrocardiogram:  Time 4/9/2021 12:13:00, rate 62,  a fib with a right bundle branch block, no acute st abnormality.    Results review:  Lab results : Lab View   4/9/2021 13:43 CDT       Est Creat Clearance Ser   63.09 mL/min    4/9/2021 12:55 CDT       Sodium Lvl                127 mmol/L  LOW                             Potassium Lvl             4.2 mmol/L                             Chloride                  90 mmol/L  LOW                             CO2                       23 mmol/L                             Calcium Lvl               9.0 mg/dL                             Glucose Lvl               110 mg/dL                             BUN                       12.1 mg/dL                             Creatinine                0.90 mg/dL                             eGFR-AA                   >60  NA                             eGFR-RAINER                  >60 mL/min/1.73 m2  NA                             Bili Total                0.9 mg/dL                             Bili Direct               0.4 mg/dL                             Bili Indirect             0.50 mg/dL                             AST                       22 unit/L                             ALT                       17 unit/L                             Alk Phos                  57 unit/L                             Total Protein             7.1 gm/dL                             Albumin Lvl               4.1 gm/dL                             Globulin                  3.0 gm/dL                             A/G Ratio                 1.4 ratio                             BNP                       210.8 pg/mL  HI                             Troponin-I                <0.010 ng/mL                             PT                        18.7 second(s)  HI                             INR                       1.6  HI                             PTT                       42.0 second(s)  HI                             WBC                       6.0 x10(3)/mcL                             RBC                        4.20 x10(6)/mcL  LOW                             Hgb                       13.0 gm/dL  LOW                             Hct                       38.2 %  LOW                             Platelet                  250 x10(3)/mcL                             MCV                       91.0 fL                             MCH                       31.0 pg                             MCHC                      34.0 gm/dL                             RDW                       14.1 %                             MPV                       9.7 fL                             Abs Neut                  4.41 x10(3)/mcL                             Neutro Auto               73 %  NA                             Lymph Auto                13 %  NA                             Mono Auto                 12 %  NA                             Eos Auto                  1 %  NA                             Abs Eos                   0.0 x10(3)/mcL                             Basophil Auto             1 %  NA                             Abs Neutro                4.41 x10(3)/mcL                             Abs Lymph                 0.8 x10(3)/mcL                             Abs Mono                  0.7 x10(3)/mcL                             Abs Baso                  0.0 x10(3)/mcL    .   Radiology results:  Rad Results (ST)  < 12 hrs   Accession: [id number]  Order: XR Chest 1 View  Report Dt/Tm: 04/09/2021 12:22  Report:   Clinical History  Chest Pain     Technique  Single view of the chest.     Comparison  No prior imaging available for comparison.     Findings  Prominent interstitial markings with small right-sided pleural  effusion. No focal opacification identified. No acute osseous  abnormality.     Impression  Prominent interstitial markings with small right pleural effusion.        .      Procedure   Critical care note   Total time: 48 minutes spent engaged in work directly related to patient care and/ or available for direct patient care,  separate from teaching time.   Critical condition(s) addressed for impending deterioration include: respiratory, cardiovascular.   Associated risk factors: hypoxia, dysrhythmia.   Management: bedside assessment, supervision of care, Interpretation (chest x-ray, electrocardiogram, blood pressure), Interventions hemodynamic management, Case review medical specialist, Alternate history family.   Performed by: self.      Impression and Plan   Diagnosis   A-fib (TFU91-KJ I48.91)   Benign essential HTN (EHM51-ML I10)   Shortness of breath on exertion (KPA11-ML R06.02)   Acute respiratory failure with hypoxia (XZI84-KA J96.01)      Calls-Consults   -  4/9/2021 15:27:00 , cis.    Plan   Disposition: Admit time  4/9/2021 15:27:00, Place in Observation Telemetry Unit.    Counseled: Patient, Family, Regarding diagnosis, Regarding diagnostic results, Regarding treatment plan, Patient indicated understanding of instructions.    Notes: I, Kvng Reyes, acted solely as a scribe for and in the presence of Dr. Gallegos who performed this service..       Addendum   I, Isabel Gallegos MD, performed the history, physical examination, MDM and procedures as above and agree with the scribe's documentation

## 2022-04-29 NOTE — DISCHARGE SUMMARY
Patient:   Garrison Garrett            MRN: [id number]            FIN: [id number]               Age:   80 years     Sex:  Male     :  1940   Associated Diagnoses:   None   Author:   Danitza Chan      Please see Progress Note from 4.12.21 as DC Summary.    Thanks.

## 2022-05-01 NOTE — HISTORICAL [facility] CERNER
This is a historical note converted from [employer]. Formatting and pictures may have been removed.  Please reference [employer] for original formatting and attached multimedia. Chief Complaint  sob that started 2 days ago. worse with exertion. cough. swelling to legs. denies cp. ?hx of afib  Reason for Consultation  hyponatremia  History of Present Illness  80-year-old white male followed outpatient by Dr. Lincoln for CKD.? He was admitted on 4/9/2021 with shortness of breath and 2-week history of worsening SLB at rest and with exertion.? Chest x-ray revealed pronounced pulmonary vasculature and small right pleural effusions.? Newly diagnosed with A. fib.? Plans for WISAM with cardioversion tomorrow.? Nephrology consulted for assistance with hyponatremia.  ?  Patient being diuresed with both Lasix and metolazone.? Appears to be diuresing well with?2125 cc over the past 24 hours and so far?already 1 L?urinated today.? Diuretics were held this morning per cardiology until further evaluation by nephrology due to persistent hyponatremia.No other labs on EMR for comparison.? Patient is awake and alert?without distress. ?I was able to lay his bed nearly flat?at which point he denied?further shortness of breath.? He does have persistent pitting edema to bilateral ankles, feet?and diminished breath sounds.? Patient has lost nearly 5 kg since admission.  Review of Systems  Constitutional:?no fever, fatigue, weakness  Eye:?no vision loss, eye redness, drainage, or pain  ENMT:?no sore throat, ear pain, sinus pain/congestion, nasal congestion/drainage  Respiratory:?no cough, no wheezing,?++ shortness of breath  Cardiovascular:?no chest pain, no palpitations,?++ edema  Gastrointestinal:?no nausea, vomiting, or diarrhea. No abdominal pain  Genitourinary:?no dysuria, no urinary frequency or urgency, no hematuria  Hema/Lymph:?no abnormal bruising or bleeding  Endocrine:?no heat or cold intolerance, no excessive thirst or excessive  urination  Musculoskeletal:?no muscle or joint pain, no joint swelling  Integumentary:?no skin rash or abnormal lesion  Neurologic: no headache, no dizziness, no weakness or numbness  ?  Physical Exam  Vitals & Measurements  T:?36.6? ?C (Oral)? TMIN:?36.6? ?C (Oral)? TMAX:?36.9? ?C (Oral)? HR:?71(Peripheral)? RR:?18? BP:?114/72? SpO2:?96%?  General:? no acute distress  HENT:? normocephalic, moist oral mucosa  Neck: supple, non-tender  Respiratory:?Bilaterally diminished, nasal cannula  Cardiovascular:?regular rate and rhythm  Gastrointestinal:?soft, non-tender, protuberant abdomen  Musculoskeletal:?1+ pitting edema pretibial, pedal  Integumentary: warm, dry, intact  Neurologic: AAOx4  ?   24 hour intake: 1096 mL  24 hour output: 2125 mL  Assessment/Plan  1. ?Hypervolemic?hyponatremia  2. ?CHF exacerbation  3. ?Paroxysmal A. fib  4. ?CAD  5. ?Hypertension  6. 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 Lincoln?every 6 months  ?  Possibility to thiazide diuretic use?is mildly contributory to hyponatremia. ?However,?more likely hypovolemic?hyponatremia.  Although patient has made much progress it is apparent he?still has some fluid excess.  ?We will give tolvaptan 15 mg x 1 dose?and monitor response.   Problem List/Past Medical History  Ongoing  Arthritis  Bilateral cataracts  Bilateral hearing loss  Bruises easily  High cholesterol  Hypertension  Obesity  Olecranon bursitis  Recurrent sinus infections  Historical  Able to lie down  Activity tolerance  Chronic back pain  Glasses  Hearing aid  Kidney  Seasonal allergies  Procedure/Surgical History  93014 - LT CATARACT W/IOL 1 STA PHACO (Left) (02/18/2015)  Extracapsular cataract removal with insertion of intraocular lens prosthesis (1 stage procedure), manual or mechanical technique (eg, irrigation and aspiration or phacoemulsification). (02/18/2015)  Insertion of intraocular lens prosthesis at time of cataract extraction, one-stage  (02/18/2015)  Phacoemulsification and aspiration of cataract (02/18/2015)  Extracapsular cataract removal with insertion of intraocular lens prosthesis (1 stage procedure), manual or mechanical technique (eg, irrigation and aspiration or phacoemulsification). (11/13/2012)  Insertion of intraocular lens prosthesis at time of cataract extraction, one-stage (11/13/2012)  Phacoemulsification and aspiration of cataract (11/13/2012)  angiogram  Cataract  ORIF right elbow  sinus surgery   Medications  Inpatient  amiodarone 200 mg oral Tab, 400 mg= 2 tab(s), Oral, BID  amLODIPine, 10 mg= 2 tab(s), Oral, Daily  atorvastatin, 40 mg= 1 tab(s), Oral, Daily  Benadryl, 25 mg= 1 cap(s), Oral, q4hr, PRN  Benadryl, 25 mg= 1 cap(s), Oral, Once a day (at bedtime), PRN  carvedilol 12.5 mg oral tablet, 12.5 mg= 1 tab(s), Oral, BIDWMeal  Eliquis 5 mg oral tablet, 5 mg= 1 tab(s), Oral, BID  hydrALAZINE, 10 mg= 0.5 mL, IV Push, q2hr, PRN  labetalol, 10 mg= 2 mL, IV Push, q1hr, PRN  lisinopril 10 mg oral tablet, 10 mg= 1 tab(s), Oral, Daily  Lopressor 1 mg/mL injectable solution, 5 mg= 5 mL, IV, q3hr, PRN  Maalox Antacid with Anti-Gas, 30 mL, Oral, q4hr, PRN  Milk of Magnesia, 30 mL, Oral, Daily, PRN  nitroglycerin, 0.4 mg= 1 tab(s), SL, q5min, PRN  Norco 5 mg-325 mg oral tablet, 1 tab(s), Oral, q4hr, PRN  Norco 5 mg-325 mg oral tablet, 2 tab(s), Oral, q4hr, PRN  potassium chloride, 40 mEq= 2 tab(s), Oral, q6hr  Senokot S, 1 tab(s), Oral, BID, PRN  Tylenol, 1000 mg= 2 tab(s), Oral, q6hr, PRN  Tylenol, 650 mg= 1 supp, WA (rectal), q6hr, PRN  Xanax, 0.25 mg= 1 tab(s), Oral, q6hr, PRN  Xylocaine Jelly 2% topical gel with applicator, 5 mL, TOP, Once, PRN  Home  amiodarone 200 mg oral Tab, 200 mg= 1 tab(s), Oral, Daily  amLODIPine 10 mg oral tablet, 10 mg= 1 tab(s), Oral, Daily,? ?Not taking: duplicate med  amlodipine 5 mg oral tablet, 10 mg= 2 tab(s), Daily  aspirin 81 mg oral tablet, CHEWABLE, 81 mg= 1 tab(s), Oral, Daily,? ?Not  taking  atorvastatin 40 mg oral tablet, 40 mg= 1 tab(s), Oral, Daily  Bromday 0.09% ophthalmic solution, 1 drop(s), Eye-Right, Daily,? ?Not taking  carvedilol 6.25 mg oral tablet, 6.25 mg= 1 tab(s), Oral, BID, 2 refills,? ?Not taking  Centrum Silver oral tablet, 1 tab(s), Oral, Daily,? ?Investigating  Coreg 12.5 mg oral tablet, 12.5 mg= 1 tab(s), Oral, BID  Eliquis 5 mg oral tablet, 5 mg= 1 tab(s), Oral, BID  fluticasone 50 mcg/inh nasal spray, 1 spray(s), Daily,? ?Not taking: duplicate med  fluticasone CFC free 50 mcg/inh inhalation aerosol with adapter, 1 inh, INH, Daily  furosemide 40 mg oral tablet, 40 mg= 1 tab(s), Daily  pravastatin 80 mg oral tablet, Daily,? ?Not taking  Pred Forte 1% ophthalmic suspension, 1 drop(s), Eye-Right, QID,? ?Not taking  ramipril 10 mg oral capsule, 10 mg= 1 cap(s), Daily  Zymaxid 0.5% ophthalmic solution, 1 drop(s), Eye-Right, BID,? ?Not taking  Allergies  No Known Allergies  Social History  Abuse/Neglect  No, No, Yes, 04/10/2021  No, 04/09/2021  Alcohol  Current, Beer, Daily, 01/28/2021  Current, Beer, Daily, 11/11/2012  Employment/School  Employed, 01/28/2021  Employed, Work/School description: . Highest education level: High school., 11/11/2012  Exercise  Home/Environment  Lives with Spouse., 01/28/2021  Lives with Spouse., 11/11/2012  Nutrition/Health  Regular, 11/11/2012  Spiritual/Cultural  Christian, 04/08/2021  Substance Use - Denies Substance Abuse, 11/11/2012  Never, 01/28/2021  Tobacco  Former smoker, quit more than 30 days ago, No, 04/10/2021  Never (less than 100 in lifetime), No, 04/09/2021  Former smoker, quit more than 30 days ago, No, 04/08/2021  Past, Cigars, 11/11/2012  Family History  Family history is negative  Immunizations  Vaccine Date Status   COVID-19 MRNA, LNP-S, PF- Pfizer 01/20/2021 Recorded   influenza virus vaccine, inactivated 10/26/2015 Recorded   influenza virus vaccine, inactivated 08/27/2014 Recorded   zoster vaccine live 04/13/2014 Recorded    influenza virus vaccine, inactivated 10/09/2013 Recorded   Lab Results  Labs Last 24 Hours?  ?Chemistry?   Sodium Lvl:?127 mmol/L?Low (04/11/21 04:56:00)   Potassium Lvl:?3.1 mmol/L?Low (04/11/21 04:56:00)   Chloride:?87 mmol/L?Low (04/11/21 04:56:00)   CO2: 29 mmol/L (04/11/21 04:56:00)   Calcium Lvl: 8.9 mg/dL (04/11/21 04:56:00)   Magnesium Lvl: 2.1 mg/dL (04/11/21 04:56:00)   Glucose Lvl: 100 mg/dL (04/11/21 04:56:00)   BUN: 9.2 mg/dL (04/11/21 04:56:00)   Creatinine: 0.89 mg/dL (04/11/21 04:56:00)   Est Creat Clearance Ser: 63.8 mL/min (04/11/21 05:44:47)   BUN/Creat Ratio: 10 (04/11/21 04:56:00)   eGFR-AA: >60 (04/11/21 04:56:00)   eGFR-RAINER: >60 (04/11/21 04:56:00)       Seen and examined.Agree with NP assessment and plan as above.  ?agree with tolvaptan and k replacement

## 2022-05-05 ENCOUNTER — PATIENT OUTREACH (OUTPATIENT)
Dept: ADMINISTRATIVE | Facility: HOSPITAL | Age: 82
End: 2022-05-05
Payer: MEDICARE

## 2022-05-09 PROBLEM — I48.21 PERMANENT ATRIAL FIBRILLATION: Status: ACTIVE | Noted: 2022-05-09

## 2022-05-09 PROBLEM — R26.81 UNSTEADINESS ON FEET: Status: ACTIVE | Noted: 2022-05-09

## 2022-05-09 PROBLEM — E87.1 HYPONATREMIA: Status: ACTIVE | Noted: 2022-05-09

## 2022-05-09 PROBLEM — I10 HYPERTENSION: Status: ACTIVE | Noted: 2022-05-09

## 2022-05-09 PROBLEM — M19.019 ARTHRITIS OF SHOULDER: Status: ACTIVE | Noted: 2022-05-09

## 2022-05-09 PROBLEM — H91.93 BILATERAL HEARING LOSS: Status: ACTIVE | Noted: 2022-05-09

## 2022-05-09 PROBLEM — E78.2 MIXED HYPERLIPIDEMIA: Status: ACTIVE | Noted: 2022-05-09

## 2022-05-14 NOTE — ED PROVIDER NOTES
Patient:   Garrison Garrett            MRN: [id number]            FIN: [id number]               Age:   81 years     Sex:  Male     :  1940   Associated Diagnoses:   Fall; Generalized weakness; Accidental fall; Abrasion, multiple sites; Closed head injury; Benign essential hypertension   Author:   Isabel Gallegos MD      Basic Information   Time seen: Date & time 2022 13:53:00.   History source: Patient.   Arrival mode: Private vehicle.   History limitation: None.   Additional information: Patient's physician(s): Kelly KEENE, Mike Taveras.      History of Present Illness   The patient presents following 82 y/o male with a history of A-Fib, HTN, and HLD presenting to the ED following a trip and fall from standing. Pt reports hitting his head and RUE. He c/o RUE pain and denies any head pain. He denies LOC. Pt is on xarelto. Pt reports he has been feeling unbalanced and having gait issues for 4-5 months and has seen his PCP about it..  The occurrence was single episode.  The fall was described as tripped, From standing.  The location where the incident occurred was: Location: head.  Right upper extremity. The character of symptoms is pain.  The degree at present is minimal.  The exacerbating factor is none.  The relieving factor is none.  Risk factors consist of age and anticoagulated.  Preceding symptoms none.  Associated symptoms: none.        Review of Systems   Constitutional symptoms:  No fever, no chills, no sweats   Skin symptoms:  No rash, no petechiae.    Eye symptoms:  Vision unchangedNo pain, no discharge, no icterus, no diplopia, no blurred vision, no blindness.    ENMT symptoms:  No ear pain, no sore throat, no nasal congestion, no sinus pain.    Respiratory symptoms:  No shortness of breath, no orthopnea, no cough, no hemoptysis, no stridor, no wheezing.    Cardiovascular symptoms:  No chest pain, no palpitations, no syncope, no diaphoresis, no peripheral edema.     Gastrointestinal symptoms:  No abdominal pain, no nausea, no vomiting, no diarrhea, no constipation, no rectal bleeding, no rectal pain.    Genitourinary symptoms:  No dysuria, no hematuria   Musculoskeletal symptoms:  RUE painNo back pain, no Muscle pain   Neurologic symptoms:  No headache, no dizziness, no altered level of consciousness, no numbness, no tingling.    Psychiatric symptoms:  Negative except as documented in HPI   Endocrine symptoms:  Negative except as documented in HPI.   Hematologic/Lymphatic symptoms:  Negative except as documented in HPI      Health Status   Allergies:    Allergic Reactions (Selected)  Severity Not Documented  Hydrochlorothiazide- Precaution..   Medications:  (Selected)   Inpatient Medications  Ordered  Boostrix (Tdap) intramuscular suspension: 0.5 mL, form: Injection, IM, Once-Unscheduled, Order duration: 1 dose(s), first dose 01/29/22 14:05:00 CST  Prescriptions  Prescribed  Eliquis 5 mg oral tablet: 5 mg = 1 tab(s), Oral, BID, # 180 tab(s), 4 Refill(s), Pharmacy: [facility] #[id number], 170, cm, Height/Length Dosing, 04/10/21 8:25:00 CDT, 90.5, kg, Weight Dosing, 04/10/21 8:25:00 CDT  furosemide 40 mg oral tablet: 40 mg = 1 tab(s), Oral, Daily, # 90 tab(s), 4 Refill(s), Pharmacy: [facility] #[id number], 170, cm, Height/Length Dosing, 04/10/21 8:25:00 CDT, 90.5, kg, Weight Dosing, 04/10/21 8:25:00 CDT  hydrochlorothiazide 12.5 mg oral tablet: 12.5 mg = 1 tab(s), Oral, Daily, # 30 tab(s), 2 Refill(s), Pharmacy: [facility] #[id number], 170, cm, Height/Length Dosing, 01/13/22 9:19:00 CST, 85.09, kg, Weight Dosing, 01/13/22 9:19:00 CST  nitroglycerin 0.4 mg sublingual TAB: 0.4 mg = 1 tab(s), SL, q5min, PRN PRN chest pain, not to exceed 3 doses/15 min--if pain persists, seek medical attention, # 1 bottle(s), 1 Refill(s), Pharmacy: [facility] #[id number], 170, cm, Height/Length Dosing, 04/10/21 8:25:00 CDT, 90.5, kg,...  Documented Medications  Documented  Coreg 12.5 mg  oral tablet: 12.5 mg = 1 tab(s), Oral, BID, # 60 tab(s), 0 Refill(s)  amiodarone 200 mg oral Tab: See Instructions, 2 tab(s) Oral BID x 2 more days, then 1 tab Twice Daily x 7 days, then 200 Mg Daily thereafter., 0 Refill(s)  atorvastatin 40 mg oral tablet: 40 mg = 1 tab(s), Oral, Daily, # 30 tab(s), 0 Refill(s)  lisinopril 40 mg oral tablet: 40 mg = 1 tab(s), Oral, Daily.      Past Medical/ Family/ Social History   Medical history:   Abnormal physical evaluation   Arthritis   Arthritis of right shoulder region   Atrial fibrillation   Bilateral cataracts   Bilateral hearing loss   Bruises easily   Flu vaccine need   High cholesterol   Hospital discharge follow-up   Hypertension   Mixed hyperlipidemia   Obesity   Olecranon bursitis   Recurrent sinus infections .   Surgical history:    Cardioversion (.) performed by Rashard Serrato MD on 2021 at 80 Years.  Comments:  2021 13:35 Antolin Dubon RN  auto-populated from documented surgical case  Transesophageal Echo (for Surgery) (.) performed by Rashard Serrato MD on 2021 at 80 Years.  Comments:  2021 13:35 Antolin Dubon RN  auto-populated from documented surgical case  24669 - LT CATARACT W/IOL 1 STA PHACO (Left) performed by Maycol Wagner MD on 2015 at 74 Years.  Comments:  2015 7:50 Akilah Elkins RN  auto-populated from documented surgical case  ORIF right elbow.  Comments:  2012 19:38 Alberta Andre RN    sinus surgery.  Comments:  2012 14:52 Fozia Lovell RN  15 years ago  Cataract (ICD-9-.9).  Comments:  2015 6:49 Caren Mcgill RN  right eye-2 years ago  angiogram..   Family history:    Father:  ()  Cause of Death: prostate cancer  Age at Death: 40 years.   Primary malignant neoplasm of prostate  Mother:  ()  Cause of Death: Heart attack & stroke  Age at Death: 70 years.   Cardiac arrest.  Stroke  Sister  Diabetes mellitus type  2  .   Social history: Alcohol use: Regularly, Tobacco use: Former smoker, Drug use: Denies.      Physical Examination   General:  Alert, no acute distress   Skin:  Warm, dry, intact, Abrasion to R arm, older contusion to L elbow   Head:  Normocephalic, Abrasions to occipital and frontal scalp   Neck:  Supple, trachea midline, no tenderness   Eye:  Pupils are equal, round and reactive to light, extraocular movements are intact   Cardiovascular:  Regular rate and rhythm, No murmur, Normal peripheral perfusion, Arterial pulses: Bilateral, dorsalis pedis, 2+.    Respiratory:  Lungs are clear to auscultation, respirations are non-labored, BS present bilaterally   Back:  Nontender, Normal range of motion   Musculoskeletal:  Normal ROM, normal strength   Gastrointestinal:  Soft, Nontender   Neurological:  Alert and oriented to person, place, time, and situation, No focal neurological deficit observed   Psychiatric:  Cooperative, appropriate mood & affect      Medical Decision Making   Differential Diagnosis:  Fall, laceration, abrasion, contusion, closed fracture, head injury, syncope, dizziness.    Rationale:  carin with hyponatremia likely related to ivvd rather than overload, with h/o chf with respiratory failrue with obs for gentle hydration, does not require admit for traumatic injury.   Documents reviewed:  Emergency department nurses' notes.   Orders  Launch Order Profile (Selected)   .   Electrocardiogram:  Time 1/29/2022 14:34:00, rate 50, The Rhythm is sinus bradycardia.  , The Axis is leftward.  , P wave and MD interval 1st degree atrioventricular block, QRS interval Right bundle branch block.    Results review:  Lab results : Lab View   1/29/2022 14:05 CST      Sodium Lvl                127 mmol/L  LOW                             Potassium Lvl             3.6 mmol/L                             Chloride                  90 mmol/L  LOW                             CO2                       25 mmol/L                              Calcium Lvl               9.4 mg/dL                             Glucose Lvl               133 mg/dL  HI                             BUN                       26.3 mg/dL  HI                             Creatinine                1.67 mg/dL  HI                             eGFR-AA                   51  NA                             eGFR-RAINER                  42 mL/min/1.73 m2  NA                             Bili Total                0.7 mg/dL                             Bili Direct               0.3 mg/dL                             Bili Indirect             0.40 mg/dL                             AST                       56 unit/L  HI                             ALT                       82 unit/L  HI                             Alk Phos                  83 unit/L                             Total Protein             6.9 gm/dL                             Albumin Lvl               3.9 gm/dL                             Globulin                  3.0 gm/dL                             A/G Ratio                 1.3 ratio                             Troponin-I                0.012 ng/mL                             WBC                       8.7 x10(3)/mcL                             RBC                       4.34 x10(6)/mcL  LOW                             Hgb                       12.8 gm/dL  LOW                             Hct                       38.3 %  LOW                             Platelet                  270 x10(3)/mcL                             MCV                       88.2 fL                             MCH                       29.5 pg                             MCHC                      33.4 gm/dL                             RDW                       14.6 %                             MPV                       9.6 fL                             Abs Neut                  6.67 x10(3)/mcL                             Neutro Auto               77 %  NA                             Lymph Auto                 10 %  NA                             Mono Auto                 10 %  NA                             Eos Auto                  2 %  NA                             Abs Eos                   0.2 x10(3)/mcL                             Basophil Auto             1 %  NA                             Abs Neutro                6.67 x10(3)/mcL                             Abs Lymph                 0.8 x10(3)/mcL                             Abs Mono                  0.9 x10(3)/mcL                             Abs Baso                  0.1 x10(3)/mcL                             PT                        15.3 second(s)  HI                             INR                       1.2                             PTT                       34.2 second(s)  HI  .   Radiology results:  Rad Results (ST)  < 12 hrs   Accession: [id number]  Order: XR Chest 1 View  Report Dt/Tm: 01/29/2022 14:44  Report:   EXAMINATION  XR Chest 1 View     INDICATION  Trauma     Comparison: Chest x-ray dated 4/9/2021     FINDINGS  The heart is stable in size. There are stable interstitial markings  without new focal airspace consolidation. There is no pleural effusion  or visible pneumothorax. There is no displaced fracture identified.     IMPRESSION:  No acute abnormality of the chest.    Accession: [id number]  Order: XR Elbow Right Minimum 3 Views  Report Dt/Tm: 01/29/2022 14:44  Report:   XR Elbow Right Minimum 3 Views     HISTORY: Trauma     COMPARISON: None.     FINDINGS:  There are postoperative changes of the proximal ulna. The hardware is  intact. There is no acute fracture or malalignment identified.  Scattered vascular calcifications are noted.        IMPRESSION:  No acute abnormality identified.      Accession: [id number]  Order: CT Cervical Spine W/O Contrast  Report Dt/Tm: 01/29/2022 14:40  Report:   EXAMINATION  CT Cervical Spine W/O Contrast     INDICATION  Neck pain after recent fall     Comparison: No similar modality comparison is  available.     TECHNIQUE  Helical-acquisition CT images were obtained without the intravenous  administration of iodine-based contrast media.  Multiplanar reformats of the cervical spine were accomplished by a CT  technologist at a separate workstation and pushed to PACS for  physician review.     Automated tube current modulation, weight-based exposure dosing,  and/or iterative reconstruction technique utilized to reach lowest  reasonably achievable exposure rate.  DLP: 1255 mGy*cm     FINDINGS  Images were reviewed in bone, soft tissue, and lung windows.     Exam quality: adequate  Absence of intravenous contrast limits assessment of the visualized  soft tissues and vascular structures.     Vertebral Column       Vertebral Bodies: No displaced fracture visualized. No  compression deformity or focal vertebral body abnormality.       Alignment: The C1 lateral masses are symmetrically aligned on C2.  No evidence of traumatic subluxation. Chronic, degenerative appearing  grade 1 anterolisthesis is present at C3 on C4, C4 on C5 and C7 on T1.       Curvature: Within normal limits.          Degenerative Changes: There are advanced multilevel degenerative  endplate and facet changes, most significant through the mid and lower  cervical levels.       Disc Spaces: Moderate to severe multilevel intervertebral  narrowing is noted, with complete loss of the C5-6 disc spaces.          Spinal Canal: No acute abnormality. Mild to moderate multilevel  spinal canal stenosis is produced by posterior disc/osteophyte  complexes.       Neuroforamina: There is very minimal severity of multilevel  bilateral osseous foraminal narrowing, most significant at the C4-C6  levels.     Paravertebral Tissues and Musculature: No thickening or focal contour  irregularity. The paraspinal musculature and overlying subcutaneous  tissues demonstrate no acute or focal lesion.     Miscellaneous       Thyroid: No infiltrative heterogeneity, contour  irregularity, or  focal nodule.       Vasculature: Widespread vascular calcification is incidentally  noted.       Upper Thorax: No acute or focal abnormality.       Skull Base / Intracranial: No acute osseous irregularity or focal  intracranial lesion.     IMPRESSION       1. No evidence of acute cortical displacement or traumatic spinal  column malalignment.    2. Advanced multilevel degenerative changes, with associated spinal  canal and foraminal stenosis.    3. Additional chronic secondary details discussed above.        Please note ligament, spinal cord, and/or vascular abnormalities  cannot be excluded on the basis of this examination.  Additional imaging recommended if there is elevated clinical concern  for high-grade soft tissue injury.    Accession: [id number]  Order: CT Head W/O Contrast  Report Dt/Tm: 01/29/2022 14:40  Report:   CT Head W/O Contrast     History: Trauma     Comparison studies: CT head dated 5/26/2014     Technique:   Axial scans were obtained from skull base to the vertex.  Coronal and sagittal reconstructions obtained from the axial data.   Automatic exposure control was utilized to limit radiation dose.  Contrast: None     Radiation Dose:  Total DLP: 1255 mGy*cm     DISCUSSION:     There is no acute intracranial hemorrhage or edema. The gray-white  matter differentiation is preserved. There are patchy hypodensities in  the subcortical and periventricular white matter and basal ganglia,  likely representing chronic microvascular ischemic changes.     There is no mass effect or midline shift. There is diffuse parenchymal  volume loss. The basal cisterns are patent. There is no abnormal  extra-axial fluid collection. Carotid and vertebral artery  calcifications are noted.     The calvarium and skull base are intact. There are changes of chronic  sinusitis with near opacification of the left maxillary sinus. The  mastoid air cells are clear.        IMPRESSION:  1.  No acute intracranial  abnormality.  2.  Chronic microvascular ischemic changes.    .      Reexamination/ Reevaluation   Time: 1/29/2022 15:05:00 .   Notes: he has had difficulty ambulating, increased weakness, fatigue, hyponatremia with carin, h/o chf, will admit for gentle hydration.      Impression and Plan   Diagnosis   Fall (PNED [id number])   Generalized weakness (PSE80-FR R53.1)   Accidental fall (IFT19-GQ W19.XXXA)   Abrasion, multiple sites (RPL29-LY T07.XXXA)   Closed head injury (GBC14-YI S09.90XA)   Benign essential hypertension (SDJ85-FO I10)      Calls-Consults   -  1/29/2022 15:08:00 , Brendan Allison.    Plan   Condition: Stable.    Disposition: Admit time  1/29/2022 15:08:00, Place in Observation Telemetry Unit.    Counseled: Patient, Regarding diagnosis, Regarding diagnostic results, Regarding treatment plan, Patient indicated understanding of instructions.    Notes:   I, Jean-Paul Mckinley, acted solely as a scribe for and in the presence of Dr. Gallegos who performed the service..       Addendum   I, Isabel Gallegos MD, performed the history, physical examination, MDM and procedures as above and agree with the scribe's documentation

## 2022-05-14 NOTE — DISCHARGE SUMMARY
Admit and Discharge Dates  Admit Date: 01/29/2022  Discharge Date: 01/30/2022  Physicians  Attending Physician - Onesmio KEENE, Angela A  Admitting Physician - Onesimo KEENE, Angela A  Primary Care Physician - Mike Mendoza MD  Discharge Diagnosis  Acute Dehydration  Generalized Weakness  s/p Ground Level Fall  Essential HTN  PAF on Xarelto  CAD  HLD  Surgical Procedures  No procedures recorded for this visit.  Immunizations  01/29/2022 - tetanus/diphtheria/pertussis, acel(Tdap)?  Admission Information  Mr. Garrett is a 81 year old male with?a?PMHx of PAF on Xarelto (was recently changed from Eliquis), CAD, HTN, HLD. He presented to ED as a Level 2 trauma activation following a ground level?fall on 1/29/22. He reported that he tripped and fell, hit his head.?He c/o RUE pain. Endorsed feeling unsteady with gait issues over the past 4-5 months with some BLE weakness. Denied any numbness or tingling to BLE, LOC,?CP, SOB, fever, chills, body aches, dizziness, lightheadedness, vision changes. He lives with his wife at home and is usually independent with ambulation and ADLs.  ?   Initial ED VS include /58, HR 50, RR 19, SPO2 99%.? Labs notable for sodium 127, chloride 90, glucose 133, BUN 26.3, creatinine 1.67, AST 56, ALT 82, hemoglobin 12.8, hematocrit 38.3.? CXR unremarkable.? Right elbow x-ray unremarkable.? CT head negative for acute intracranial abnormality.? CT C-spine negative for acute abnormality, noted advanced multilevel degenerative changes with associated spinal canal and foraminal stenosis.?Initial troponin normal.? EKG with sinus bradycardia, first-degree AV block, rate 50. Rapid COVID-19 negative.?He received Tdap vaccine, started on IV fluids in the ED.? Admitted to hospitalist services for further work-up and management of care.  Hospital Course  Pt was evaluated by cards and deemed cleared to go home with f/u for 30 day monitor.? Note his coreg is on hold.? He is not orthostatic.?  HCTZ has been discontinued  Time Spent on discharge  > 30 min  Objective  Vitals & Measurements  T:?36.6? ?C (Oral)? TMIN:?36.4? ?C (Oral)? TMAX:?36.9? ?C (Oral)? HR:?56(Monitored)? RR:?18? BP:?150/63? SpO2:?95%? WT:?77?kg? BMI:?25.14?  Physical Exam  General:?Cooperative; in no acute distress  HENT:?normocephalic; atraumatic; [place]  Respiratory:?non-labored respirations; symmetrical chest expansion  Cardiovascular:?regular rate and rhythm; bradycardia, no edema noted  Gastrointestinal:?soft, non-tender, non-distended  Musculoskeletal:?moves all extremities  Integumentary: warm and dry; abrasions  Neurologic: Alert and oriented; motor/sensory function intact  Patient Discharge Condition  improved and progressing as expected  Discharge Disposition  home   Discharge Medication Reconciliation  Prescribed  acetaminophen (acetaminophen 500 mg oral tablet)?1,000 mg, Oral, q6hr, PRN pain, mild  Continue  amiodarone (amiodarone 200 mg oral Tab)?See Instructions  atorvastatin (atorvastatin 40 mg oral tablet)?40 mg, Oral, Daily  doxazosin (doxazosin 4 mg oral tablet)?4 mg, Oral, Daily  furosemide (furosemide 40 mg oral tablet)?40 mg, Oral, Daily  lisinopril (lisinopril 40 mg oral tablet)?40 mg, Oral, Daily  nitroglycerin (nitroglycerin 0.4 mg sublingual TAB)?0.4 mg, SL, q5min, PRN chest pain  rivaroxaban (Xarelto 20mg Tablet)?20 mg, Oral, Once  Discontinue  apixaban (Eliquis 5 mg oral tablet)?5 mg, Oral, BID  carvedilol (Coreg 12.5 mg oral tablet)?12.5 mg, Oral, BID  hydrochlorothiazide (hydrochlorothiazide 12.5 mg oral tablet)?12.5 mg, Oral, Daily  Education and Orders Provided  Discharge - 01/30/22 14:33:00 CST, Home, Give all scheduled vaccinations prior to discharge.?  Discharge Activity - Activity as Tolerated?  Discharge Diet - Cardiac?  Follow up  this week with cards, 1 week with pcp  [employer]  No Qualifying Data

## 2022-05-14 NOTE — OP NOTE
Patient:   Garrison Garrett             MRN: [id number]            FIN: [id number]               Age:   81 years     Sex:  Male     :  1940   Associated Diagnoses:   Dizziness; Palpitation; Right bundle branch block (RBBB); Syncope; Weakness generalized   Author:   Stevo Szymanski DO      Brief Operative Note   Operative Information   Date/ Time:  3/11/2022 10:03:00.     Preoperative Diagnosis: Dizziness (TKN64-MV R42), Palpitation (WTG27-VQ R00.2), Right bundle branch block (RBBB) (IMZ41-ZD I45.10), Syncope (YGM90-BM R55), Weakness generalized (JTG58-NM R53.1).     Postoperative Diagnosis: Dizziness (UHZ66-ZH R42), Palpitation (OLB00-AH R00.2), Right bundle branch block (RBBB) (TSH52-TN I45.10), Syncope (OIL13-EJ R55), Weakness generalized (JSK87-DM R53.1).     Procedures Performed: Implantation of SJM ILR.   .     Surgeon: Stevo Szymanski DO     Esimated blood loss: No blood loss.     Description of Procedure/Findings/    Complications: See op report on chart.   .     
no back pain/no arthritis/no neck pain

## 2022-05-14 NOTE — DISCHARGE SUMMARY
DISCHARGE DATE:  03/11/2022    HOSPITAL COURSE:  Mr. Garrett was admitted for the elective implantation of a [employer] implantable loop recorder and, as outlined in the operative report, he underwent the successful implantation under sterile surgical technique without any adverse issues.  He tolerated the procedure well.  Postoperative course has been unremarkable allowing for discharge home today.     I reviewed the plan of care and progress and status with his wife and how to use the device.  The wife related that the patient had a syncopal episode while work and also relates that he is having bouts of generalized weakness, increased fatigue, palpitations, dizziness, etc.  Will continue to monitor these symptoms closely.    DISCHARGE DIAGNOSES:    1. Status post implantation of the [employer] implantable loop recorder/Confirm.  2. Generalized weakness/palpitations.  3. Syncope/presyncope.  4. Dyslipidemia.  5. Hypertension.    RECOMMENDATIONS:  Mr. Garrett will be arranged for discharge to home today to continue on same medications as on admission.  I have asked him to call or return should he have any further problems or complaints.  I will be checking on his progress in my office in the very near future.  We will continue to monitor his progress closely and titrate medical therapies as tolerated.  He will be discharged home also on empiric Cipro for 10 days as well as Toradol as needed for postoperative discomfort.  Further recommendations forthcoming.  We have also given him discharge instructions regarding wound care and how to use his implantable loop recorder.        ______________________________  DO PARAM Wang/FRANCISCA  DD:  03/11/2022  Time:  11:25AM  DT:  03/11/2022  Time:  12:47PM  Job #:  [id number]

## 2022-05-21 NOTE — HISTORICAL [facility] CERNER
This is a historical note converted from Ana. Formatting and pictures may have been removed.  Please reference Ana for original formatting and attached multimedia. Chief Complaint  Trauma activation Level 2. fall, hit head on xarelto.  History of Present Illness  Mr. Garrett is a 81 year old male with?a?PMHx of PAF on Xarelto (was recently changed from Eliquis), CAD, HTN, HLD. He presented to ED as a Level 2 trauma activation following a ground level?fall on 1/29/22. He reported that he tripped and fell, hit his head.?He c/o RUE pain. Endorsed feeling unsteady with gait issues over the past 4-5 months with some BLE weakness. Denied any numbness or tingling to BLE, LOC,?CP, SOB, fever, chills, body aches, dizziness, lightheadedness, vision changes. He lives with his wife at home and is usually independent with ambulation and ADLs.  ?  Initial ED VS include /58, HR 50, RR 19, SPO2 99%.? Labs notable for sodium 127, chloride 90, glucose 133, BUN 26.3, creatinine 1.67, AST 56, ALT 82, hemoglobin 12.8, hematocrit 38.3.? CXR unremarkable.? Right elbow x-ray unremarkable.? CT head negative for acute intracranial abnormality.? CT C-spine negative for acute abnormality, noted advanced multilevel degenerative changes with associated spinal canal and foraminal stenosis.?Initial troponin normal.? EKG with sinus bradycardia, first-degree AV block, rate 50. Rapid COVID-19 negative.?He received Tdap vaccine, started on IV fluids in the ED.? Admitted to hospitalist services for further work-up and management of care.  Review of Systems  Except as documented, all other systems reviewed and negative.?  Physical Exam  Vitals & Measurements  HR:?50(Peripheral)? HR:?50(Monitored)? RR:?19? BP:?148/58? SpO2:?99%?  General:?Cooperative; in no acute distress  HENT:?normocephalic; atraumatic; [place]  Respiratory:?non-labored respirations; symmetrical chest expansion  Cardiovascular:?regular rate and rhythm; bradycardia, no edema  noted  Gastrointestinal:?soft, non-tender, non-distended  Musculoskeletal:?moves all extremities  Integumentary: warm and dry; abrasions  Neurologic: Alert and oriented; motor/sensory function intact  Assessment/Plan  Acute Dehydration  Generalized Weakness  s/p Ground Level Fall  Essential HTN  PAF on Xarelto  CAD  HLD  ?  Plan:  NS at 100 ml/hr  Fall Precautions  Telemetry Monitoring  Consider MRI L-Spine, will defer to attending MD  UA, Urine osmolality and Serum osmolality pending  Thyroid panel also pending  Resume appropriate home medications once updated  Labs in AM  ?  ?  Source of history: Self. Medical record.?  Present at bedside: Wife  History limitation: None.  Provider information: PCP:?Kelly KEENE, Mike Taveras  ?  Code status: Full  DVT prophylaxis: Eliquis  ?  I, Floresita?GRACIE Escalera,?NP have reviewed and discussed this case with Dr. Echols.  Please see the following addendum for further assessment and plan from attending MD.  ?  ?   I, Angela Burdick MD assumed care of pt @ 18:00.? I have reviewed documentation , clinical decision making and plan of care .? I performed a face to face encounter? with pt.? His wife was at bedside and pt granted permission for me to speak openly.? He states for several weeks he has experienced weak spells.? He was told by his cardiologist that his heart rate a ?little lower than normal.?? He denies room spinning but c/o weakness that suddenly overtakes him.? He legs become weak and he falls.? Orthostatics are pending.? He also states at times it feels as though he may black out.? It is not consistently associated with any movement or activity.? ////will place on tele.? Images reviewed from fall.? Pt and wife state they would like cards to see them while in house.  Encounter:? 83 min   Problem List/Past Medical History  PAF on Eliquis, CAD, HTN, HLD  Procedure/Surgical History  Cardioversion (.) (04/12/2021)  Cardioversion, elective, electrical conversion  of arrhythmia; external (04/12/2021)  Echocardiography, transesophageal, real-time with image documentation (2D) (with or without M-mode recording); including probe placement, image acquisition, interpretation and report (04/12/2021)  Restoration of Cardiac Rhythm, Single (04/12/2021)  Transesophageal Echo (for Surgery) (.) (04/12/2021)  Ultrasonography of Left Heart, Transesophageal (04/12/2021)  27631 - LT CATARACT W/IOL 1 STA PHACO (Left) (02/18/2015)  Extracapsular cataract removal with insertion of intraocular lens prosthesis (1 stage procedure), manual or mechanical technique (eg, irrigation and aspiration or phacoemulsification). (02/18/2015)  Insertion of intraocular lens prosthesis at time of cataract extraction, one-stage (02/18/2015)  Phacoemulsification and aspiration of cataract (02/18/2015)  Extracapsular cataract removal with insertion of intraocular lens prosthesis (1 stage procedure), manual or mechanical technique (eg, irrigation and aspiration or phacoemulsification). (11/13/2012)  Insertion of intraocular lens prosthesis at time of cataract extraction, one-stage (11/13/2012)  Phacoemulsification and aspiration of cataract (11/13/2012)  angiogram  Cataract  ORIF right elbow  sinus surgery   Medications  Inpatient  acetaminophen, 1000 mg= 2 tab(s), Oral, q6hr, PRN  NS 1,000 mL, 1000 mL, IV  Zofran, 4 mg= 2 mL, IV Push, q4hr, PRN  Home  amiodarone 200 mg oral Tab, See Instructions,? ?Investigating  atorvastatin 40 mg oral tablet, 40 mg= 1 tab(s), Oral, Daily  Coreg 12.5 mg oral tablet, 12.5 mg= 1 tab(s), Oral, BID  Eliquis 5 mg oral tablet, 5 mg= 1 tab(s), Oral, BID, 4 refills  furosemide 40 mg oral tablet, 40 mg= 1 tab(s), Oral, Daily, 4 refills  hydrochlorothiazide 12.5 mg oral tablet, 12.5 mg= 1 tab(s), Oral, Daily, 2 refills  lisinopril 40 mg oral tablet, 40 mg= 1 tab(s), Oral, Daily  nitroglycerin 0.4 mg sublingual TAB, 0.4 mg= 1 tab(s), SL, q5min, PRN, 1  refills  Allergies  hydrochlorothiazide?(precaution)  Social History  Abuse/Neglect  No, No, Yes, 01/29/2022  Alcohol  Current, Beer, Daily, 11/11/2012  Employment/School  Employed, Work/School description: . Highest education level: High school., 11/11/2012  Exercise  Home/Environment  Lives with Spouse., 11/11/2012  Nutrition/Health  Regular, 11/11/2012  Spiritual/Cultural  Alevism, 04/08/2021  Substance Use - Denies Substance Abuse, 11/11/2012  Never, 01/28/2021  Tobacco  Past, Cigars, 11/11/2012  Family History  Cardiac arrest.: Mother.  Diabetes mellitus type 2: Sister.  Primary malignant neoplasm of prostate: Father.  Stroke: Mother.  Immunizations  Vaccine Date Status   tetanus/diphtheria/pertussis, acel(Tdap) 01/29/2022 Given   influenza virus vaccine, inactivated 09/30/2021 Given   COVID-19 MRNA, LNP-S, PF- Pfizer 02/10/2021 Recorded   COVID-19 MRNA, LNP-S, PF- Pfizer 01/20/2021 Recorded   influenza virus vaccine, inactivated 10/26/2015 Recorded   influenza virus vaccine, inactivated 08/27/2014 Recorded   zoster vaccine live 04/13/2014 Recorded   influenza virus vaccine, inactivated 10/09/2013 Recorded   Lab Results  Labs Last 24 Hours?  ?Chemistry? Hematology/Coagulation?   Sodium Lvl:?127 mmol/L?Low (01/29/22 14:05:00) WBC: 8.7 x10(3)/mcL (01/29/22 14:05:00)   Potassium Lvl: 3.6 mmol/L (01/29/22 14:05:00) RBC:?4.34 x10(6)/mcL?Low (01/29/22 14:05:00)   Chloride:?90 mmol/L?Low (01/29/22 14:05:00) Hgb:?12.8 gm/dL?Low (01/29/22 14:05:00)   CO2: 25 mmol/L (01/29/22 14:05:00) Hct:?38.3 %?Low (01/29/22 14:05:00)   Calcium Lvl: 9.4 mg/dL (01/29/22 14:05:00) Platelet: 270 x10(3)/mcL (01/29/22 14:05:00)   Glucose Lvl:?133 mg/dL?High (01/29/22 14:05:00) MCV: 88.2 fL (01/29/22 14:05:00)   BUN:?26.3 mg/dL?High (01/29/22 14:05:00) MCH: 29.5 pg (01/29/22 14:05:00)   Creatinine:?1.67 mg/dL?High (01/29/22 14:05:00) MCHC: 33.4 gm/dL (01/29/22 14:05:00)   eGFR-AA: 51 (01/29/22 14:05:00) RDW: 14.6 % (01/29/22  14:05:00)   eGFR-RAIENR: 42 mL/min/1.73 m2 (01/29/22 14:05:00) MPV: 9.6 fL (01/29/22 14:05:00)   Bili Total: 0.7 mg/dL (01/29/22 14:05:00) Abs Neut: 6.67 x10(3)/mcL (01/29/22 14:05:00)   Bili Direct: 0.3 mg/dL (01/29/22 14:05:00) Neutro Auto: 77 % (01/29/22 14:05:00)   Bili Indirect: 0.4 mg/dL (01/29/22 14:05:00) Lymph Auto: 10 % (01/29/22 14:05:00)   AST:?56 unit/L?High (01/29/22 14:05:00) Mono Auto: 10 % (01/29/22 14:05:00)   ALT:?82 unit/L?High (01/29/22 14:05:00) Eos Auto: 2 % (01/29/22 14:05:00)   Alk Phos: 83 unit/L (01/29/22 14:05:00) Abs Eos: 0.2 x10(3)/mcL (01/29/22 14:05:00)   Total Protein: 6.9 gm/dL (01/29/22 14:05:00) Basophil Auto: 1 % (01/29/22 14:05:00)   Albumin Lvl: 3.9 gm/dL (01/29/22 14:05:00) Abs Neutro: 6.67 x10(3)/mcL (01/29/22 14:05:00)   Globulin: 3 gm/dL (01/29/22 14:05:00) Abs Lymph: 0.8 x10(3)/mcL (01/29/22 14:05:00)   A/G Ratio: 1.3 ratio (01/29/22 14:05:00) Abs Mono: 0.9 x10(3)/mcL (01/29/22 14:05:00)   Troponin-I: 0.012 ng/mL (01/29/22 14:05:00) Abs Baso: 0.1 x10(3)/mcL (01/29/22 14:05:00)    PT:?15.3 second(s)?High (01/29/22 14:05:00)    INR: 1.2 (01/29/22 14:05:00)    PTT:?34.2 second(s)?High (01/29/22 14:05:00)   Diagnostic Results  Accession:?[id number]  Order:?XR Chest 1 View  Report Dt/Tm:?01/29/2022 14:44  Report:?  EXAMINATION  XR Chest 1 View  ?  INDICATION  Trauma  ?  Comparison: Chest x-ray dated 4/9/2021  ?  FINDINGS  The heart is stable in size. There are stable interstitial markings  without new focal airspace consolidation. There is no pleural effusion  or visible pneumothorax. There is no displaced fracture identified.  ?  IMPRESSION:  No acute abnormality of the chest.  ?  Accession:?[id number]  Order:?XR Elbow Right Minimum 3 Views  Report Dt/Tm:?01/29/2022 14:44  Report:?  XR Elbow Right Minimum 3 Views  ?  HISTORY: Trauma  ?  COMPARISON: None.  ?  FINDINGS:  There are postoperative changes of the proximal ulna. The hardware is  intact. There is no acute fracture or  malalignment identified.  Scattered vascular calcifications are noted.  ?  ?  IMPRESSION:  No acute abnormality identified.  ?  ?  Accession:?[id number]  Order:?CT Cervical Spine W/O Contrast  Report Dt/Tm:?01/29/2022 14:40  Report:?  EXAMINATION  CT Cervical Spine W/O Contrast  ?  INDICATION  Neck pain after recent fall  ?  Comparison: No similar modality comparison is available.  ?  TECHNIQUE  Helical-acquisition CT images were obtained without the intravenous  administration of iodine-based contrast media.  Multiplanar reformats of the cervical spine were accomplished by a CT  technologist at a separate workstation and pushed to PACS for  physician review.  ?  Automated tube current modulation, weight-based exposure dosing,  and/or iterative reconstruction technique utilized to reach lowest  reasonably achievable exposure rate.  DLP: 1255 mGy*cm  ?  FINDINGS  Images were reviewed in bone, soft tissue, and lung windows.  ?  Exam quality: adequate  Absence of intravenous contrast limits assessment of the visualized  soft tissues and vascular structures.  ?  Vertebral Column  ?? ? Vertebral Bodies: No displaced fracture visualized. No  compression deformity or focal vertebral body abnormality.  ?? ? Alignment: The C1 lateral masses are symmetrically aligned on C2.  No evidence of traumatic subluxation. Chronic, degenerative appearing  grade 1 anterolisthesis is present at C3 on C4, C4 on C5 and C7 on T1.  ?? ? Curvature: Within normal limits.  ?  ?? ? Degenerative Changes: There are advanced multilevel degenerative  endplate and facet changes, most significant through the mid and lower  cervical levels.  ?? ? Disc Spaces: Moderate to severe multilevel intervertebral  narrowing is noted, with complete loss of the C5-6 disc spaces.  ?  ?? ? Spinal Canal: No acute abnormality. Mild to moderate multilevel  spinal canal stenosis is produced by posterior disc/osteophyte  complexes.  ?? ? Neuroforamina: There is very  minimal severity of multilevel  bilateral osseous foraminal narrowing, most significant at the C4-C6  levels.  ?  Paravertebral Tissues and Musculature: No thickening or focal contour  irregularity. The paraspinal musculature and overlying subcutaneous  tissues demonstrate no acute or focal lesion.  ?  Miscellaneous  ?? ? Thyroid: No infiltrative heterogeneity, contour irregularity, or  focal nodule.  ?? ? Vasculature: Widespread vascular calcification is incidentally  noted.  ?? ? Upper Thorax: No acute or focal abnormality.  ?? ? Skull Base / Intracranial: No acute osseous irregularity or focal  intracranial lesion.  ?  IMPRESSION  ?  ??1. No evidence of acute cortical displacement or traumatic spinal  column malalignment.  ??2. Advanced multilevel degenerative changes, with associated spinal  canal and foraminal stenosis.  ??3. Additional chronic secondary details discussed above.  ?  ?  Please note ligament, spinal cord, and/or vascular abnormalities  cannot be excluded on the basis of this examination.  Additional imaging recommended if there is elevated clinical concern  for high-grade soft tissue injury.  ?  Accession:?[id number]  Order:?CT Head W/O Contrast  Report Dt/Tm:?01/29/2022 14:40  Report:?  CT Head W/O Contrast  ?  History: Trauma  ?  Comparison studies: CT head dated 5/26/2014  ?  Technique:?  Axial scans were obtained from skull base to the vertex.  Coronal and sagittal reconstructions obtained from the axial data.?  Automatic exposure control was utilized to limit radiation dose.  Contrast: None  ?  Radiation Dose:  Total DLP: 1255 mGy*cm  ?  DISCUSSION:  ?  There is no acute intracranial hemorrhage or edema. The gray-white  matter differentiation is preserved. There are patchy hypodensities in  the subcortical and periventricular white matter and basal ganglia,  likely representing chronic microvascular ischemic changes.  ?  There is no mass effect or midline shift. There is diffuse  parenchymal  volume loss. The basal cisterns are patent. There is no abnormal  extra-axial fluid collection. Carotid and vertebral artery  calcifications are noted.  ?  The calvarium and skull base are intact. There are changes of chronic  sinusitis with near opacification of the left maxillary sinus. The  mastoid air cells are clear.  ?  ?  IMPRESSION:  1. ?No acute intracranial abnormality.  2. ?Chronic microvascular ischemic changes.  ?

## 2022-06-22 ENCOUNTER — TELEPHONE (OUTPATIENT)
Dept: FAMILY MEDICINE | Facility: CLINIC | Age: 82
End: 2022-06-22
Payer: MEDICARE

## 2022-06-22 NOTE — TELEPHONE ENCOUNTER
----- Message from Kaitlin Hernandez sent at 6/22/2022  2:27 PM CDT -----  Regarding: call back  .Type:  Needs Medical Advice    Who Called: Pt  Symptoms (please be specific):   How long has patient had these symptoms: NA  Pharmacy name and phone #: [facility] in [place]  Would the patient rather a call back or a response via [email or web address]? Call back  Best Call Back Number: [phone]  Additional Information: Broken neck and had a cast for 3 mths that was taken off on Monday, want nurse to f/u. Says he have pain in that area

## 2022-06-22 NOTE — TELEPHONE ENCOUNTER
----- Message from Brooklyn García sent at 6/22/2022 12:19 PM CDT -----  Regarding: Advice  Type:  Needs Medical Advice    Who Called: Patient  Symptoms (please be specific): took his neck brace off and he is sore    How long has patient had these symptoms:    [email or web address]  Would the patient rather a call back or a response via [email or web address]?   Best Call Back Number: [phone]  Additional Information: Asking for cream to help

## 2022-06-24 NOTE — TELEPHONE ENCOUNTER
Spoke with pt regarding Dr. Mendoza's response. Pt states to disregard as he already obtained it from the provider who ordered the neck brace.

## 2022-10-04 ENCOUNTER — OFFICE VISIT (OUTPATIENT)
Dept: FAMILY MEDICINE | Facility: CLINIC | Age: 82
End: 2022-10-04
Payer: MEDICARE

## 2022-10-04 VITALS
RESPIRATION RATE: 18 BRPM | HEART RATE: 64 BPM | HEIGHT: 66 IN | TEMPERATURE: 98 F | BODY MASS INDEX: 28.93 KG/M2 | OXYGEN SATURATION: 98 % | WEIGHT: 180 LBS | DIASTOLIC BLOOD PRESSURE: 60 MMHG | SYSTOLIC BLOOD PRESSURE: 126 MMHG

## 2022-10-04 DIAGNOSIS — I10 PRIMARY HYPERTENSION: Primary | Chronic | ICD-10-CM

## 2022-10-04 DIAGNOSIS — Z23 NEED FOR VACCINATION: ICD-10-CM

## 2022-10-04 DIAGNOSIS — Z12.5 PROSTATE CANCER SCREENING: ICD-10-CM

## 2022-10-04 DIAGNOSIS — E66.3 OVERWEIGHT: Chronic | ICD-10-CM

## 2022-10-04 DIAGNOSIS — Z11.59 NEED FOR HEPATITIS C SCREENING TEST: ICD-10-CM

## 2022-10-04 PROBLEM — S12.9XXA CLOSED FRACTURE OF MULTIPLE CERVICAL VERTEBRAE: Status: ACTIVE | Noted: 2022-03-17

## 2022-10-04 PROBLEM — E66.9 OBESITY: Status: ACTIVE | Noted: 2022-10-04

## 2022-10-04 PROBLEM — J32.9 RECURRENT SINUSITIS: Status: ACTIVE | Noted: 2022-10-04

## 2022-10-04 PROBLEM — N18.2 CHRONIC KIDNEY DISEASE, STAGE 2 (MILD): Status: ACTIVE | Noted: 2021-02-04

## 2022-10-04 PROBLEM — M70.20 OLECRANON BURSITIS: Status: ACTIVE | Noted: 2022-10-04

## 2022-10-04 PROBLEM — H26.9 CATARACT OF BOTH EYES: Status: ACTIVE | Noted: 2022-10-04

## 2022-10-04 PROBLEM — G82.20 PARAPARESIS: Status: ACTIVE | Noted: 2022-10-04

## 2022-10-04 PROBLEM — Q60.0 RENAL AGENESIS, UNILATERAL: Status: ACTIVE | Noted: 2021-02-04

## 2022-10-04 PROCEDURE — 1160F PR REVIEW ALL MEDS BY PRESCRIBER/CLIN PHARMACIST DOCUMENTED: ICD-10-PCS | Mod: CPTII,,, | Performed by: FAMILY MEDICINE

## 2022-10-04 PROCEDURE — 3288F FALL RISK ASSESSMENT DOCD: CPT | Mod: CPTII,,, | Performed by: FAMILY MEDICINE

## 2022-10-04 PROCEDURE — 90694 FLU VACCINE - QUADRIVALENT - ADJUVANTED: ICD-10-PCS | Mod: ,,, | Performed by: FAMILY MEDICINE

## 2022-10-04 PROCEDURE — 1101F PR PT FALLS ASSESS DOC 0-1 FALLS W/OUT INJ PAST YR: ICD-10-PCS | Mod: CPTII,,, | Performed by: FAMILY MEDICINE

## 2022-10-04 PROCEDURE — 99214 PR OFFICE/OUTPT VISIT, EST, LEVL IV, 30-39 MIN: ICD-10-PCS | Mod: 25,,, | Performed by: FAMILY MEDICINE

## 2022-10-04 PROCEDURE — 1160F RVW MEDS BY RX/DR IN RCRD: CPT | Mod: CPTII,,, | Performed by: FAMILY MEDICINE

## 2022-10-04 PROCEDURE — 3078F PR MOST RECENT DIASTOLIC BLOOD PRESSURE < 80 MM HG: ICD-10-PCS | Mod: CPTII,,, | Performed by: FAMILY MEDICINE

## 2022-10-04 PROCEDURE — G0008 ADMIN INFLUENZA VIRUS VAC: HCPCS | Mod: ,,, | Performed by: FAMILY MEDICINE

## 2022-10-04 PROCEDURE — 1159F MED LIST DOCD IN RCRD: CPT | Mod: CPTII,,, | Performed by: FAMILY MEDICINE

## 2022-10-04 PROCEDURE — 1126F AMNT PAIN NOTED NONE PRSNT: CPT | Mod: CPTII,,, | Performed by: FAMILY MEDICINE

## 2022-10-04 PROCEDURE — 1126F PR PAIN SEVERITY QUANTIFIED, NO PAIN PRESENT: ICD-10-PCS | Mod: CPTII,,, | Performed by: FAMILY MEDICINE

## 2022-10-04 PROCEDURE — G0008 FLU VACCINE - QUADRIVALENT - ADJUVANTED: ICD-10-PCS | Mod: ,,, | Performed by: FAMILY MEDICINE

## 2022-10-04 PROCEDURE — 3074F PR MOST RECENT SYSTOLIC BLOOD PRESSURE < 130 MM HG: ICD-10-PCS | Mod: CPTII,,, | Performed by: FAMILY MEDICINE

## 2022-10-04 PROCEDURE — 3078F DIAST BP <80 MM HG: CPT | Mod: CPTII,,, | Performed by: FAMILY MEDICINE

## 2022-10-04 PROCEDURE — 1159F PR MEDICATION LIST DOCUMENTED IN MEDICAL RECORD: ICD-10-PCS | Mod: CPTII,,, | Performed by: FAMILY MEDICINE

## 2022-10-04 PROCEDURE — 99214 OFFICE O/P EST MOD 30 MIN: CPT | Mod: 25,,, | Performed by: FAMILY MEDICINE

## 2022-10-04 PROCEDURE — 3288F PR FALLS RISK ASSESSMENT DOCUMENTED: ICD-10-PCS | Mod: CPTII,,, | Performed by: FAMILY MEDICINE

## 2022-10-04 PROCEDURE — 1101F PT FALLS ASSESS-DOCD LE1/YR: CPT | Mod: CPTII,,, | Performed by: FAMILY MEDICINE

## 2022-10-04 PROCEDURE — 3074F SYST BP LT 130 MM HG: CPT | Mod: CPTII,,, | Performed by: FAMILY MEDICINE

## 2022-10-04 PROCEDURE — 90694 VACC AIIV4 NO PRSRV 0.5ML IM: CPT | Mod: ,,, | Performed by: FAMILY MEDICINE

## 2022-10-04 RX ORDER — LISINOPRIL 20 MG/1
20 TABLET ORAL DAILY
Status: ON HOLD | COMMUNITY
Start: 2022-09-09 | End: 2023-01-25 | Stop reason: HOSPADM

## 2022-10-04 RX ORDER — AMIODARONE HYDROCHLORIDE 200 MG/1
200 TABLET ORAL DAILY
Status: ON HOLD | COMMUNITY
Start: 2022-07-21 | End: 2023-01-25 | Stop reason: SDUPTHER

## 2022-10-04 RX ORDER — DOXAZOSIN 2 MG/1
2 TABLET ORAL
COMMUNITY
Start: 2022-03-17 | End: 2023-10-06

## 2022-10-04 RX ORDER — ATORVASTATIN CALCIUM 40 MG/1
40 TABLET, FILM COATED ORAL DAILY
COMMUNITY
Start: 2022-09-24

## 2022-10-04 NOTE — PROGRESS NOTES
Subjective:      Patient ID: Garrison Garrett is a 81 y.o. male.    Chief Complaint: Flu Vaccine (Pt here for check up and his flu shot)    Patient here for 6 month surveillance visit without complaints, and for annual flu vaccine.    BP initially elevated, but when checked manually (by me) was normal.    Denies complaints.      Problem List Items Addressed This Visit       Hypertension - Primary (Chronic)    Relevant Orders    CBC Auto Differential    Comprehensive Metabolic Panel    Lipid Panel    Urinalysis    Overweight (Chronic)    Need for vaccination    Relevant Orders    Influenza (FLUAD) - Quadrivalent (Adjuvanted) *Preferred* (65+) (PF)    (In Office Administered) Pneumococcal Conjugate Vaccine (20 Valent) (IM)    BMI 29.0-29.9,adult     Other Visit Diagnoses       Prostate cancer screening        Relevant Orders    PSA, Screening    Need for hepatitis C screening test        Relevant Orders    Hepatitis C Antibody            The patient's Health Maintenance was reviewed and the following appears to be due:   Health Maintenance Due   Topic Date Due    Hepatitis C Screening  Never done    Pneumococcal Vaccines (Age 65+) (1 - PCV) Never done    Shingles Vaccine (2 of 3) 06/08/2014    COVID-19 Vaccine (3 - Booster for Pfizer series) 04/07/2021    Influenza Vaccine (1) 09/01/2022       Past Medical History:  History reviewed. No pertinent past medical history.  History reviewed. No pertinent surgical history.  Review of patient's allergies indicates:   Allergen Reactions    Hydrochlorothiazide      Other reaction(s): precaution     Current Outpatient Medications on File Prior to Visit   Medication Sig Dispense Refill    doxazosin (CARDURA) 2 MG tablet Take 2 mg by mouth.      amiodarone (PACERONE) 200 MG Tab Take 200 mg by mouth once daily.      atorvastatin (LIPITOR) 40 MG tablet Take 40 mg by mouth every evening.      furosemide (LASIX) 40 MG tablet Take 40 mg by mouth once daily at 6am.       "hydroCHLOROthiazide (HYDRODIURIL) 12.5 MG Tab Take 12.5 mg by mouth once daily.      HYDROcodone-acetaminophen (NORCO) 5-325 mg per tablet Take 1 tablet by mouth every 6 (six) hours.      lisinopriL (PRINIVIL,ZESTRIL) 20 MG tablet Take 20 mg by mouth once daily.      rivaroxaban (XARELTO) 20 mg Tab Take 20 mg by mouth once daily.      [DISCONTINUED] doxazosin (CARDURA) 4 MG tablet Take 4 mg by mouth once daily at 6am.      [DISCONTINUED] lisinopriL (PRINIVIL,ZESTRIL) 40 MG tablet Take 40 mg by mouth once daily.       No current facility-administered medications on file prior to visit.     Social History     Socioeconomic History    Marital status:    Tobacco Use    Smoking status: Never     Passive exposure: Never    Smokeless tobacco: Never   Substance and Sexual Activity    Alcohol use: Not Currently    Drug use: Never    Sexual activity: Never     History reviewed. No pertinent family history.    Review of Systems   All other systems reviewed and are negative.    Objective:   /60 (BP Location: Left arm, Patient Position: Sitting, BP Method: Small (Automatic))   Pulse 64   Temp 97.9 °F (36.6 °C) (Oral)   Resp 18   Ht 5' 6" (1.676 m)   Wt 81.6 kg (180 lb)   SpO2 98%   BMI 29.05 kg/m²     Physical Exam  Vitals and nursing note reviewed.   Constitutional:       General: He is awake.      Appearance: Normal appearance. He is well-developed, well-groomed and overweight.   HENT:      Head: Normocephalic and atraumatic.      Right Ear: Tympanic membrane, ear canal and external ear normal.      Left Ear: Tympanic membrane, ear canal and external ear normal.      Nose: Nose normal.      Mouth/Throat:      Mouth: Mucous membranes are dry.      Pharynx: Oropharynx is clear.   Eyes:      Extraocular Movements: Extraocular movements intact.      Conjunctiva/sclera: Conjunctivae normal.      Pupils: Pupils are equal, round, and reactive to light.   Cardiovascular:      Rate and Rhythm: Normal rate and " regular rhythm.      Pulses: Normal pulses.      Heart sounds: Normal heart sounds.   Pulmonary:      Effort: Pulmonary effort is normal.      Breath sounds: Normal breath sounds.   Abdominal:      General: Abdomen is flat. Bowel sounds are normal.      Palpations: Abdomen is soft.   Musculoskeletal:         General: Normal range of motion.      Cervical back: Normal range of motion and neck supple.   Skin:     General: Skin is warm and dry.      Capillary Refill: Capillary refill takes less than 2 seconds.   Neurological:      General: No focal deficit present.      Mental Status: He is alert and oriented to person, place, and time. Mental status is at baseline.   Psychiatric:         Mood and Affect: Mood normal.         Behavior: Behavior normal. Behavior is cooperative.         Thought Content: Thought content normal.         Judgment: Judgment normal.         No visits with results within 6 Month(s) from this visit.   Latest known visit with results is:   Historical on 03/10/2022   Component Date Value Ref Range Status    PT 03/10/2022 13.6  12.5 - 14.5 Final    INR 03/10/2022 1.1  0.0 - 1.3 Final    Cholesterol Total 03/10/2022 163  <=200 Final    HDL Cholesterol 03/10/2022 57  35 - 60 Final    Triglyceride 03/10/2022 49  34 - 140 Final    Very Low Density Lipoprotein 03/10/2022 10   Final    Cholesterol/HDL Ratio 03/10/2022 3  0 - 5 Final    LDL Cholesterol 03/10/2022 96.00  50.00 - 140.00 Final    Sodium Level 03/10/2022 136  136 - 145 Final    Potassium Level 03/10/2022 4.2  3.5 - 5.1 Final    Chloride 03/10/2022 99  98 - 107 Final    Carbon Dioxide 03/10/2022 27  23 - 31 Final    Glucose Level 03/10/2022 102  82 - 115 Final    Blood Urea Nitrogen 03/10/2022 10.9  8.4 - 25.7 Final    Creatinine 03/10/2022 0.89  0.73 - 1.18 Final    Calcium Level Total 03/10/2022 9.5  8.7 - 10.5 Final    Albumin Level 03/10/2022 3.5  3.4 - 4.8 Final    Protein Total 03/10/2022 6.3  5.8 - 7.6 Final    Globulin 03/10/2022 2.8   2.4 - 3.5 Final    Albumin/Globulin Ratio 03/10/2022 1.2  1.1 - 2.0 Final    Alkaline Phosphatase 03/10/2022 83  40 - 150 Final    Bilirubin Total 03/10/2022 0.6  <=1.5 Final    Bilirubin Direct 03/10/2022 0.3  0.0 - 0.5 Final    Bilirubin Indirect 03/10/2022 0.30  0.00 - 0.80 Final    Aspartate Aminotransferase 03/10/2022 28  5 - 34 Final    Alanine Aminotransferase 03/10/2022 40  0 - 55 Final    Hemolysis 03/10/2022 1   Final    Icterus 03/10/2022 1   Final    Lipemia 03/10/2022 <0   Final    Magnesium Level 03/10/2022 1.80  1.60 - 2.60 Final    Hemolysis 03/10/2022 1   Final    Estimated GFR- 03/10/2022 >60   Final    Estimated GFR-Non  03/10/2022 >60   Final    Thyroid Stimulating Hormone 03/10/2022 2.5804  0.3500 - 4.9400 Final    WBC 03/10/2022 6.8  4.5 - 11.5 Final    RBC 03/10/2022 3.97  4.70 - 6.10 Final    Hgb 03/10/2022 12.0  14.0 - 18.0 Final    Hct 03/10/2022 35.7  42.0 - 52.0 Final    Platelet 03/10/2022 257  130 - 400 Final    MCV 03/10/2022 89.9  80.0 - 94.0 Final    MCH 03/10/2022 30.2  27.0 - 31.0 Final    MCHC 03/10/2022 33.6  33.0 - 36.0 Final    RDW 03/10/2022 16.0  11.5 - 17.0 Final    MPV 03/10/2022 10.3  9.4 - 12.4 Final     Assessment:     1. Primary hypertension    2. Need for vaccination    3. BMI 29.0-29.9,adult    4. Overweight    5. Prostate cancer screening    6. Need for hepatitis C screening test      Plan:   I am having Garrison Garrett maintain his furosemide, hydroCHLOROthiazide, HYDROcodone-acetaminophen, rivaroxaban, doxazosin, atorvastatin, amiodarone, and lisinopriL.  Problem List Items Addressed This Visit       Hypertension - Primary (Chronic)    Relevant Orders    CBC Auto Differential    Comprehensive Metabolic Panel    Lipid Panel    Urinalysis    Overweight (Chronic)    Need for vaccination    Relevant Orders    Influenza (FLUAD) - Quadrivalent (Adjuvanted) *Preferred* (65+) (PF)    (In Office Administered) Pneumococcal Conjugate  Vaccine (20 Valent) (IM)    BMI 29.0-29.9,adult     Other Visit Diagnoses       Prostate cancer screening        Relevant Orders    PSA, Screening    Need for hepatitis C screening test        Relevant Orders    Hepatitis C Antibody          Follow up for about 6 months for AWV.    Garrison was seen today for flu vaccine.    Diagnoses and all orders for this visit:    Primary hypertension  -     CBC Auto Differential; Future  -     Comprehensive Metabolic Panel; Future  -     Lipid Panel; Future  -     Urinalysis; Future  -     Urinalysis  Controlled today on recheck. Monitor BP at home. Patient is savvy to ranges and readings that he needs to be concerned about  RTC 6 months (about) for AWV    Need for vaccination  -     Influenza (FLUAD) - Quadrivalent (Adjuvanted) *Preferred* (65+) (PF)  -     (In Office Administered) Pneumococcal Conjugate Vaccine (20 Valent) (IM)      BMI 29.0-29.9,adult  Overweight  Documented for chart completeness and HCC    Prostate cancer screening  -     PSA, Screening; Future    Need for hepatitis C screening test  -     Hepatitis C Antibody; Future         [unfilled]  Orders Placed This Encounter   Procedures    Influenza (FLUAD) - Quadrivalent (Adjuvanted) *Preferred* (65+) (PF)    (In Office Administered) Pneumococcal Conjugate Vaccine (20 Valent) (IM)    CBC Auto Differential     Standing Status:   Future     Standing Expiration Date:   10/4/2023    Comprehensive Metabolic Panel     Standing Status:   Future     Standing Expiration Date:   10/4/2023    Lipid Panel     Standing Status:   Future     Standing Expiration Date:   10/4/2023    Urinalysis     Standing Status:   Future     Number of Occurrences:   1     Standing Expiration Date:   10/4/2023    PSA, Screening     Standing Status:   Future     Standing Expiration Date:   10/4/2023    Hepatitis C Antibody     Standing Status:   Future     Standing Expiration Date:   10/4/2023     Order Specific Question:   Release to patient      Answer:   Immediate       Medication List with Changes/Refills   Current Medications    AMIODARONE (PACERONE) 200 MG TAB    Take 200 mg by mouth once daily.    ATORVASTATIN (LIPITOR) 40 MG TABLET    Take 40 mg by mouth every evening.    DOXAZOSIN (CARDURA) 2 MG TABLET    Take 2 mg by mouth.    FUROSEMIDE (LASIX) 40 MG TABLET    Take 40 mg by mouth once daily at 6am.    HYDROCHLOROTHIAZIDE (HYDRODIURIL) 12.5 MG TAB    Take 12.5 mg by mouth once daily.    HYDROCODONE-ACETAMINOPHEN (NORCO) 5-325 MG PER TABLET    Take 1 tablet by mouth every 6 (six) hours.    LISINOPRIL (PRINIVIL,ZESTRIL) 20 MG TABLET    Take 20 mg by mouth once daily.    RIVAROXABAN (XARELTO) 20 MG TAB    Take 20 mg by mouth once daily.   Discontinued Medications    DOXAZOSIN (CARDURA) 4 MG TABLET    Take 4 mg by mouth once daily at 6am.    LISINOPRIL (PRINIVIL,ZESTRIL) 40 MG TABLET    Take 40 mg by mouth once daily.      Medication List with Changes/Refills   Current Medications    AMIODARONE (PACERONE) 200 MG TAB    Take 200 mg by mouth once daily.       Start Date: 7/21/2022 End Date: --    ATORVASTATIN (LIPITOR) 40 MG TABLET    Take 40 mg by mouth every evening.       Start Date: 9/24/2022 End Date: --    DOXAZOSIN (CARDURA) 2 MG TABLET    Take 2 mg by mouth.       Start Date: 3/17/2022 End Date: --    FUROSEMIDE (LASIX) 40 MG TABLET    Take 40 mg by mouth once daily at 6am.       Start Date: 4/22/2022 End Date: --    HYDROCHLOROTHIAZIDE (HYDRODIURIL) 12.5 MG TAB    Take 12.5 mg by mouth once daily.       Start Date: 1/13/2022 End Date: --    HYDROCODONE-ACETAMINOPHEN (NORCO) 5-325 MG PER TABLET    Take 1 tablet by mouth every 6 (six) hours.       Start Date: 4/18/2022 End Date: --    LISINOPRIL (PRINIVIL,ZESTRIL) 20 MG TABLET    Take 20 mg by mouth once daily.       Start Date: 9/9/2022  End Date: --    RIVAROXABAN (XARELTO) 20 MG TAB    Take 20 mg by mouth once daily.       Start Date: 1/29/2022 End Date: --   Discontinued Medications     DOXAZOSIN (CARDURA) 4 MG TABLET    Take 4 mg by mouth once daily at 6am.       Start Date: 1/29/2022 End Date: 10/4/2022    LISINOPRIL (PRINIVIL,ZESTRIL) 40 MG TABLET    Take 40 mg by mouth once daily.       Start Date: 9/30/2021 End Date: 10/4/2022

## 2022-10-17 ENCOUNTER — ANESTHESIA EVENT (OUTPATIENT)
Dept: CARDIOLOGY | Facility: HOSPITAL | Age: 82
End: 2022-10-17
Payer: MEDICARE

## 2022-10-17 ENCOUNTER — HOSPITAL ENCOUNTER (OUTPATIENT)
Dept: CARDIOLOGY | Facility: HOSPITAL | Age: 82
Discharge: HOME | End: 2022-10-17
Attending: INTERNAL MEDICINE
Payer: MEDICARE

## 2022-10-17 ENCOUNTER — ANESTHESIA (OUTPATIENT)
Dept: CARDIOLOGY | Facility: HOSPITAL | Age: 82
End: 2022-10-17
Payer: MEDICARE

## 2022-10-17 VITALS
TEMPERATURE: 98 F | HEIGHT: 67 IN | WEIGHT: 166.69 LBS | BODY MASS INDEX: 26.16 KG/M2 | RESPIRATION RATE: 20 BRPM | SYSTOLIC BLOOD PRESSURE: 181 MMHG | HEART RATE: 68 BPM | OXYGEN SATURATION: 97 % | DIASTOLIC BLOOD PRESSURE: 85 MMHG

## 2022-10-17 VITALS
OXYGEN SATURATION: 100 % | SYSTOLIC BLOOD PRESSURE: 182 MMHG | TEMPERATURE: 99 F | HEART RATE: 75 BPM | RESPIRATION RATE: 16 BRPM | DIASTOLIC BLOOD PRESSURE: 81 MMHG

## 2022-10-17 DIAGNOSIS — I48.19 PERSISTENT ATRIAL FIBRILLATION: ICD-10-CM

## 2022-10-17 DIAGNOSIS — Z01.818 PREOP TESTING: ICD-10-CM

## 2022-10-17 DIAGNOSIS — I48.19 PERSISTENT ATRIAL FIBRILLATION: Primary | ICD-10-CM

## 2022-10-17 LAB — BSA FOR ECHO PROCEDURE: 1.89 M2

## 2022-10-17 PROCEDURE — 37000009 HC ANESTHESIA EA ADD 15 MINS

## 2022-10-17 PROCEDURE — 37000008 HC ANESTHESIA 1ST 15 MINUTES

## 2022-10-17 PROCEDURE — 25000003 PHARM REV CODE 250: Performed by: NURSE ANESTHETIST, CERTIFIED REGISTERED

## 2022-10-17 PROCEDURE — 93325 DOPPLER ECHO COLOR FLOW MAPG: CPT

## 2022-10-17 PROCEDURE — 93321 DOPPLER ECHO F-UP/LMTD STD: CPT

## 2022-10-17 PROCEDURE — 63600175 PHARM REV CODE 636 W HCPCS: Performed by: NURSE ANESTHETIST, CERTIFIED REGISTERED

## 2022-10-17 RX ORDER — DIPHENHYDRAMINE HYDROCHLORIDE 50 MG/ML
25 INJECTION INTRAMUSCULAR; INTRAVENOUS EVERY 6 HOURS PRN
Status: CANCELLED | OUTPATIENT
Start: 2022-10-17

## 2022-10-17 RX ORDER — PROPOFOL 10 MG/ML
VIAL (ML) INTRAVENOUS CONTINUOUS PRN
Status: DISCONTINUED | OUTPATIENT
Start: 2022-10-17 | End: 2022-10-17

## 2022-10-17 RX ORDER — LIDOCAINE HYDROCHLORIDE 10 MG/ML
1 INJECTION, SOLUTION EPIDURAL; INFILTRATION; INTRACAUDAL; PERINEURAL ONCE
Status: DISCONTINUED | OUTPATIENT
Start: 2022-10-17 | End: 2022-10-18 | Stop reason: HOSPADM

## 2022-10-17 RX ORDER — LIDOCAINE HYDROCHLORIDE 20 MG/ML
INJECTION INTRAVENOUS
Status: DISCONTINUED | OUTPATIENT
Start: 2022-10-17 | End: 2022-10-17

## 2022-10-17 RX ORDER — SODIUM CHLORIDE, SODIUM GLUCONATE, SODIUM ACETATE, POTASSIUM CHLORIDE AND MAGNESIUM CHLORIDE 30; 37; 368; 526; 502 MG/100ML; MG/100ML; MG/100ML; MG/100ML; MG/100ML
1000 INJECTION, SOLUTION INTRAVENOUS CONTINUOUS
Status: DISCONTINUED | OUTPATIENT
Start: 2022-10-17 | End: 2022-10-18 | Stop reason: HOSPADM

## 2022-10-17 RX ORDER — ONDANSETRON 2 MG/ML
4 INJECTION INTRAMUSCULAR; INTRAVENOUS DAILY PRN
Status: CANCELLED | OUTPATIENT
Start: 2022-10-17

## 2022-10-17 RX ADMIN — LIDOCAINE HYDROCHLORIDE 60 MG: 20 INJECTION, SOLUTION INTRAVENOUS at 08:10

## 2022-10-17 RX ADMIN — SODIUM CHLORIDE, SODIUM GLUCONATE, SODIUM ACETATE, POTASSIUM CHLORIDE AND MAGNESIUM CHLORIDE: 526; 502; 368; 37; 30 INJECTION, SOLUTION INTRAVENOUS at 08:10

## 2022-10-17 RX ADMIN — PROPOFOL 100 MG: 10 INJECTION, EMULSION INTRAVENOUS at 08:10

## 2022-10-17 RX ADMIN — PROPOFOL 200 MCG/KG/MIN: 10 INJECTION, EMULSION INTRAVENOUS at 08:10

## 2022-10-17 NOTE — ANESTHESIA POSTPROCEDURE EVALUATION
Anesthesia Post Evaluation    Patient: Garrison Garrett    Procedure(s) Performed: * No procedures listed *    Final Anesthesia Type: general      Patient location during evaluation: PACU  Patient participation: Yes- Able to Participate  Level of consciousness: awake and alert  Post-procedure vital signs: reviewed and stable  Pain management: adequate  Airway patency: patent      Anesthetic complications: no      Cardiovascular status: blood pressure returned to baseline  Respiratory status: unassisted  Hydration status: euvolemic  Follow-up not needed.          Vitals Value Taken Time   /85 10/17/22 0916   Temp ** 10/17/22 1015   Pulse 68 10/17/22 0922   Resp 18 10/17/22 0922   SpO2 97 % 10/17/22 0922   Vitals shown include unvalidated device data.      No case tracking events are documented in the log.      Pain/Hieu Score: Hieu Score: 10 (10/17/2022  9:30 AM)

## 2022-10-17 NOTE — ANESTHESIA PREPROCEDURE EVALUATION
10/17/2022  Garrison Garrett is a 81 y.o., male with paroxysmal A fib and recent history of falls.  He is not a good candidate for anticoagulation, and is here for WISAM today to evaluate for left atrial appendage device.  He is feeling like he is at his baseline this AM, calm and conversant.  He has not had issues with anesthesia in the past.      Pre-op Assessment    I have reviewed the Patient Summary Reports.     I have reviewed the Nursing Notes. I have reviewed the NPO Status.   I have reviewed the Medications.     Review of Systems  Anesthesia Hx:  No problems with previous Anesthesia  Denies Family Hx of Anesthesia complications.   Denies Personal Hx of Anesthesia complications.   Cardiovascular:   Exercise tolerance: poor Hypertension CAD  Dysrhythmias  CHF    Pulmonary:  Pulmonary Normal    Renal/:   Chronic Renal Disease        Physical Exam  General: Well nourished, Cooperative, Alert and Oriented    Airway:  Mallampati: II   Mouth Opening: Normal  TM Distance: Normal  Tongue: Normal  Neck ROM: Normal ROM    Dental:  Dentures    Chest/Lungs:  Clear to auscultation, Normal Respiratory Rate    Heart:  Rate: Normal  Rhythm: Regular Rhythm        Anesthesia Plan  Type of Anesthesia, risks & benefits discussed:    Anesthesia Type: Gen Natural Airway  Intra-op Monitoring Plan: Standard ASA Monitors  Post Op Pain Control Plan: multimodal analgesia  Induction:  IV  Informed Consent: Informed consent signed with the Patient and all parties understand the risks and agree with anesthesia plan.  All questions answered.   ASA Score: 3  Day of Surgery Review of History & Physical: H&P Update referred to the surgeon/provider.    Ready For Surgery From Anesthesia Perspective.     .

## 2022-10-17 NOTE — DISCHARGE SUMMARY
[facility] - Cardiology Diagnostics  Discharge Note  Short Stay    Transesophageal echo (WISAM)      OUTCOME: Patient tolerated treatment/procedure well without complication and is now ready for discharge.    DISPOSITION: Home or Self Care    FINAL DIAGNOSIS:  Permanent atrial fibrillation    FOLLOWUP: In clinic    DISCHARGE INSTRUCTIONS:    Discharge Procedure Orders   Diet Cardiac     Lifting restrictions   Order Comments: Do not raise arm above shoulder height and no more then 15 pounds.     Notify your health care provider if you experience any of the following:  temperature >100.4     Notify your health care provider if you experience any of the following:  redness, tenderness, or signs of infection (pain, swelling, redness, odor or green/yellow discharge around incision site)        TIME SPENT ON DISCHARGE: 15 minutes

## 2022-10-17 NOTE — TRANSFER OF CARE
Anesthesia Transfer of Care Note    Patient: Garrison Garrett    Procedure(s) Performed: * No procedures listed *    Patient location: PACU    Anesthesia Type: general    Transport from OR: Transported from OR on room air with adequate spontaneous ventilation    Post pain: adequate analgesia    Post assessment: no apparent anesthetic complications    Post vital signs: stable    Level of consciousness: awake and alert    Nausea/Vomiting: no nausea/vomiting    Complications: none    Transfer of care protocol was followed

## 2023-01-18 RX ORDER — SACUBITRIL AND VALSARTAN 24; 26 MG/1; MG/1
2 TABLET, FILM COATED ORAL DAILY
COMMUNITY

## 2023-01-20 ENCOUNTER — ANESTHESIA EVENT (OUTPATIENT)
Dept: CARDIOLOGY | Facility: HOSPITAL | Age: 83
DRG: 274 | End: 2023-01-20
Payer: MEDICARE

## 2023-01-24 ENCOUNTER — HOSPITAL ENCOUNTER (INPATIENT)
Facility: HOSPITAL | Age: 83
LOS: 1 days | Discharge: HOME | DRG: 274 | End: 2023-01-25
Attending: INTERNAL MEDICINE | Admitting: INTERNAL MEDICINE
Payer: MEDICARE

## 2023-01-24 ENCOUNTER — HOSPITAL ENCOUNTER (OUTPATIENT)
Dept: CARDIOLOGY | Facility: HOSPITAL | Age: 83
Discharge: HOME | DRG: 274 | End: 2023-01-24
Attending: INTERNAL MEDICINE
Payer: MEDICARE

## 2023-01-24 ENCOUNTER — ANESTHESIA (OUTPATIENT)
Dept: CARDIOLOGY | Facility: HOSPITAL | Age: 83
DRG: 274 | End: 2023-01-24
Payer: MEDICARE

## 2023-01-24 DIAGNOSIS — I48.0 PAROXYSMAL ATRIAL FIBRILLATION: Primary | ICD-10-CM

## 2023-01-24 DIAGNOSIS — I48.0 PAROXYSMAL ATRIAL FIBRILLATION: ICD-10-CM

## 2023-01-24 DIAGNOSIS — Z01.818 PREOP TESTING: ICD-10-CM

## 2023-01-24 DIAGNOSIS — I48.91 ATRIAL FIBRILLATION: ICD-10-CM

## 2023-01-24 LAB — BSA FOR ECHO PROCEDURE: 1.97 M2

## 2023-01-24 PROCEDURE — 25000003 PHARM REV CODE 250: Performed by: INTERNAL MEDICINE

## 2023-01-24 PROCEDURE — 11000001 HC ACUTE MED/SURG PRIVATE ROOM

## 2023-01-24 PROCEDURE — C1894 INTRO/SHEATH, NON-LASER: HCPCS | Performed by: INTERNAL MEDICINE

## 2023-01-24 PROCEDURE — 63600175 PHARM REV CODE 636 W HCPCS: Performed by: NURSE ANESTHETIST, CERTIFIED REGISTERED

## 2023-01-24 PROCEDURE — 93010 ELECTROCARDIOGRAM REPORT: CPT | Mod: 76,,, | Performed by: INTERNAL MEDICINE

## 2023-01-24 PROCEDURE — 33340 PERQ CLSR TCAT L ATR APNDGE: CPT | Mod: Q0 | Performed by: INTERNAL MEDICINE

## 2023-01-24 PROCEDURE — 25500020 PHARM REV CODE 255: Performed by: INTERNAL MEDICINE

## 2023-01-24 PROCEDURE — 21400001 HC TELEMETRY ROOM

## 2023-01-24 PROCEDURE — 93325 DOPPLER ECHO COLOR FLOW MAPG: CPT

## 2023-01-24 PROCEDURE — 25000003 PHARM REV CODE 250: Performed by: NURSE ANESTHETIST, CERTIFIED REGISTERED

## 2023-01-24 PROCEDURE — 93010 EKG 12-LEAD: ICD-10-PCS | Mod: ,,, | Performed by: INTERNAL MEDICINE

## 2023-01-24 PROCEDURE — 93005 ELECTROCARDIOGRAM TRACING: CPT

## 2023-01-24 PROCEDURE — 36620 INSERTION CATHETER ARTERY: CPT | Performed by: ANESTHESIOLOGY

## 2023-01-24 PROCEDURE — 27201423 OPTIME MED/SURG SUP & DEVICES STERILE SUPPLY: Performed by: INTERNAL MEDICINE

## 2023-01-24 PROCEDURE — 37000009 HC ANESTHESIA EA ADD 15 MINS: Performed by: INTERNAL MEDICINE

## 2023-01-24 PROCEDURE — 37000008 HC ANESTHESIA 1ST 15 MINUTES: Performed by: INTERNAL MEDICINE

## 2023-01-24 DEVICE — LEFT ATRIAL APPENDAGE OCCLUDER
Type: IMPLANTABLE DEVICE | Site: HEART | Status: FUNCTIONAL
Brand: AMPLATZER™ AMULET™

## 2023-01-24 RX ORDER — PROPOFOL 10 MG/ML
VIAL (ML) INTRAVENOUS
Status: DISCONTINUED | OUTPATIENT
Start: 2023-01-24 | End: 2023-01-24

## 2023-01-24 RX ORDER — SODIUM CHLORIDE, SODIUM GLUCONATE, SODIUM ACETATE, POTASSIUM CHLORIDE AND MAGNESIUM CHLORIDE 30; 37; 368; 526; 502 MG/100ML; MG/100ML; MG/100ML; MG/100ML; MG/100ML
INJECTION, SOLUTION INTRAVENOUS CONTINUOUS
Status: CANCELLED | OUTPATIENT
Start: 2023-01-24 | End: 2023-02-23

## 2023-01-24 RX ORDER — ROCURONIUM BROMIDE 10 MG/ML
INJECTION, SOLUTION INTRAVENOUS
Status: DISCONTINUED | OUTPATIENT
Start: 2023-01-24 | End: 2023-01-24

## 2023-01-24 RX ORDER — LISINOPRIL 20 MG/1
20 TABLET ORAL DAILY
Status: CANCELLED | OUTPATIENT
Start: 2023-01-24

## 2023-01-24 RX ORDER — LIDOCAINE HYDROCHLORIDE 20 MG/ML
INJECTION, SOLUTION EPIDURAL; INFILTRATION; INTRACAUDAL; PERINEURAL
Status: DISCONTINUED | OUTPATIENT
Start: 2023-01-24 | End: 2023-01-24

## 2023-01-24 RX ORDER — HYDRALAZINE HYDROCHLORIDE 20 MG/ML
INJECTION INTRAMUSCULAR; INTRAVENOUS
Status: DISCONTINUED | OUTPATIENT
Start: 2023-01-24 | End: 2023-01-24

## 2023-01-24 RX ORDER — ASPIRIN 81 MG/1
81 TABLET ORAL DAILY
Status: DISCONTINUED | OUTPATIENT
Start: 2023-01-25 | End: 2023-01-25 | Stop reason: HOSPADM

## 2023-01-24 RX ORDER — DEXAMETHASONE SODIUM PHOSPHATE 4 MG/ML
INJECTION, SOLUTION INTRA-ARTICULAR; INTRALESIONAL; INTRAMUSCULAR; INTRAVENOUS; SOFT TISSUE
Status: DISCONTINUED | OUTPATIENT
Start: 2023-01-24 | End: 2023-01-24

## 2023-01-24 RX ORDER — HEPARIN SODIUM 1000 [USP'U]/ML
INJECTION, SOLUTION INTRAVENOUS; SUBCUTANEOUS
Status: DISCONTINUED | OUTPATIENT
Start: 2023-01-24 | End: 2023-01-24

## 2023-01-24 RX ORDER — CEFAZOLIN SODIUM 1 G/3ML
INJECTION, POWDER, FOR SOLUTION INTRAMUSCULAR; INTRAVENOUS
Status: DISCONTINUED | OUTPATIENT
Start: 2023-01-24 | End: 2023-01-24

## 2023-01-24 RX ORDER — FENTANYL CITRATE 50 UG/ML
INJECTION, SOLUTION INTRAMUSCULAR; INTRAVENOUS
Status: DISCONTINUED | OUTPATIENT
Start: 2023-01-24 | End: 2023-01-24

## 2023-01-24 RX ORDER — DIPHENHYDRAMINE HYDROCHLORIDE 50 MG/ML
25 INJECTION INTRAMUSCULAR; INTRAVENOUS EVERY 6 HOURS PRN
Status: DISCONTINUED | OUTPATIENT
Start: 2023-01-24 | End: 2023-01-25 | Stop reason: HOSPADM

## 2023-01-24 RX ORDER — CEFAZOLIN SODIUM 2 G/50ML
2 SOLUTION INTRAVENOUS ONCE
Status: DISCONTINUED | OUTPATIENT
Start: 2023-01-24 | End: 2023-01-25 | Stop reason: HOSPADM

## 2023-01-24 RX ORDER — FUROSEMIDE 20 MG/1
40 TABLET ORAL
Status: CANCELLED | OUTPATIENT
Start: 2023-01-25

## 2023-01-24 RX ORDER — PHENYLEPHRINE HCL IN 0.9% NACL 1 MG/10 ML
SYRINGE (ML) INTRAVENOUS
Status: DISCONTINUED | OUTPATIENT
Start: 2023-01-24 | End: 2023-01-24

## 2023-01-24 RX ORDER — LIDOCAINE HYDROCHLORIDE 10 MG/ML
1 INJECTION, SOLUTION EPIDURAL; INFILTRATION; INTRACAUDAL; PERINEURAL ONCE
Status: CANCELLED | OUTPATIENT
Start: 2023-01-24 | End: 2023-01-24

## 2023-01-24 RX ORDER — CLOPIDOGREL BISULFATE 75 MG/1
75 TABLET ORAL DAILY
Status: DISCONTINUED | OUTPATIENT
Start: 2023-01-25 | End: 2023-01-25 | Stop reason: HOSPADM

## 2023-01-24 RX ORDER — HEPARIN SODIUM 10000 [USP'U]/100ML
INJECTION, SOLUTION INTRAVENOUS CONTINUOUS PRN
Status: DISCONTINUED | OUTPATIENT
Start: 2023-01-24 | End: 2023-01-24

## 2023-01-24 RX ORDER — LIDOCAINE HYDROCHLORIDE 10 MG/ML
INJECTION INFILTRATION; PERINEURAL
Status: DISCONTINUED | OUTPATIENT
Start: 2023-01-24 | End: 2023-01-25 | Stop reason: HOSPADM

## 2023-01-24 RX ORDER — DOXAZOSIN 1 MG/1
2 TABLET ORAL DAILY
Status: CANCELLED | OUTPATIENT
Start: 2023-01-24

## 2023-01-24 RX ORDER — PROTAMINE SULFATE 10 MG/ML
INJECTION, SOLUTION INTRAVENOUS
Status: DISCONTINUED | OUTPATIENT
Start: 2023-01-24 | End: 2023-01-24

## 2023-01-24 RX ORDER — HYDROCODONE BITARTRATE AND ACETAMINOPHEN 5; 325 MG/1; MG/1
1 TABLET ORAL EVERY 4 HOURS PRN
Status: DISCONTINUED | OUTPATIENT
Start: 2023-01-24 | End: 2023-01-25 | Stop reason: HOSPADM

## 2023-01-24 RX ORDER — MUPIROCIN 20 MG/G
OINTMENT TOPICAL
Status: DISCONTINUED | OUTPATIENT
Start: 2023-01-24 | End: 2023-01-25 | Stop reason: HOSPADM

## 2023-01-24 RX ORDER — ONDANSETRON 2 MG/ML
INJECTION INTRAMUSCULAR; INTRAVENOUS
Status: DISCONTINUED | OUTPATIENT
Start: 2023-01-24 | End: 2023-01-24

## 2023-01-24 RX ORDER — AMIODARONE HYDROCHLORIDE 200 MG/1
200 TABLET ORAL DAILY
Status: CANCELLED | OUTPATIENT
Start: 2023-01-24

## 2023-01-24 RX ORDER — ONDANSETRON 2 MG/ML
4 INJECTION INTRAMUSCULAR; INTRAVENOUS DAILY PRN
Status: DISCONTINUED | OUTPATIENT
Start: 2023-01-24 | End: 2023-01-25 | Stop reason: HOSPADM

## 2023-01-24 RX ORDER — ATORVASTATIN CALCIUM 40 MG/1
40 TABLET, FILM COATED ORAL NIGHTLY
Status: CANCELLED | OUTPATIENT
Start: 2023-01-24

## 2023-01-24 RX ADMIN — LIDOCAINE HYDROCHLORIDE 4 ML: 20 INJECTION, SOLUTION EPIDURAL; INFILTRATION; INTRACAUDAL; PERINEURAL at 01:01

## 2023-01-24 RX ADMIN — HEPARIN SODIUM 8000 UNITS: 1000 INJECTION, SOLUTION INTRAVENOUS; SUBCUTANEOUS at 01:01

## 2023-01-24 RX ADMIN — SUGAMMADEX 200 MG: 100 INJECTION, SOLUTION INTRAVENOUS at 01:01

## 2023-01-24 RX ADMIN — HYDRALAZINE HYDROCHLORIDE 10 MG: 20 INJECTION INTRAMUSCULAR; INTRAVENOUS at 01:01

## 2023-01-24 RX ADMIN — ONDANSETRON 4 MG: 2 INJECTION INTRAMUSCULAR; INTRAVENOUS at 12:01

## 2023-01-24 RX ADMIN — PROPOFOL 140 MG: 10 INJECTION, EMULSION INTRAVENOUS at 12:01

## 2023-01-24 RX ADMIN — Medication 50 MCG: at 01:01

## 2023-01-24 RX ADMIN — CEFAZOLIN 2 G: 330 INJECTION, POWDER, FOR SOLUTION INTRAMUSCULAR; INTRAVENOUS at 12:01

## 2023-01-24 RX ADMIN — ROCURONIUM BROMIDE 40 MG: 10 INJECTION, SOLUTION INTRAVENOUS at 12:01

## 2023-01-24 RX ADMIN — HYDROCODONE BITARTRATE AND ACETAMINOPHEN 1 TABLET: 5; 325 TABLET ORAL at 04:01

## 2023-01-24 RX ADMIN — MUPIROCIN: 20 OINTMENT TOPICAL at 10:01

## 2023-01-24 RX ADMIN — LIDOCAINE HYDROCHLORIDE 80 ML: 20 INJECTION, SOLUTION EPIDURAL; INFILTRATION; INTRACAUDAL; PERINEURAL at 12:01

## 2023-01-24 RX ADMIN — ROCURONIUM BROMIDE 10 MG: 10 INJECTION, SOLUTION INTRAVENOUS at 12:01

## 2023-01-24 RX ADMIN — HEPARIN SODIUM 1000 UNITS/HR: 10000 INJECTION, SOLUTION INTRAVENOUS at 01:01

## 2023-01-24 RX ADMIN — PROPOFOL 60 MG: 10 INJECTION, EMULSION INTRAVENOUS at 12:01

## 2023-01-24 RX ADMIN — Medication 100 MCG: at 01:01

## 2023-01-24 RX ADMIN — PROTAMINE SULFATE 40 MG: 10 INJECTION, SOLUTION INTRAVENOUS at 01:01

## 2023-01-24 RX ADMIN — DEXAMETHASONE SODIUM PHOSPHATE 4 MG: 4 INJECTION, SOLUTION INTRA-ARTICULAR; INTRALESIONAL; INTRAMUSCULAR; INTRAVENOUS; SOFT TISSUE at 12:01

## 2023-01-24 RX ADMIN — HYDROCODONE BITARTRATE AND ACETAMINOPHEN 1 TABLET: 5; 325 TABLET ORAL at 08:01

## 2023-01-24 RX ADMIN — FENTANYL CITRATE 100 MCG: 50 INJECTION, SOLUTION INTRAMUSCULAR; INTRAVENOUS at 12:01

## 2023-01-24 NOTE — Clinical Note
"Encounter Date: 7/4/2017       History     Chief Complaint   Patient presents with    Vomiting     started this am    Diarrhea     Patient is a 50 year old female who presents with vomiting and diarrhea. She denied PMH. She reports she ate nachos at a 4th of July party. She reports "more episodes of vomiting and diarrhea than I can count". 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 She denied significant abdominal pain. She denied blood or mucous in her stools. She denied fever or chills. She denied attempted treatment. Son with similar symptoms.      The history is provided by the patient and the spouse.     Review of patient's allergies indicates:  No Known Allergies  History reviewed. No pertinent past medical history.  Past Surgical History:   Procedure Laterality Date    CHOLECYSTECTOMY      LAPAROSCOPIC GASTRIC BANDING      TONSILLECTOMY       No family history on file.  Social History   Substance Use Topics    Smoking status: Not on file    Smokeless tobacco: Not on file    Alcohol use Not on file     Review of Systems   Constitutional: Negative for chills and fever.   HENT: Negative for congestion and sore throat.    Respiratory: Negative for cough and shortness of breath.    Cardiovascular: Negative for chest pain.   Gastrointestinal: Positive for diarrhea and vomiting. Negative for abdominal pain, constipation and nausea.   Genitourinary: Negative for dysuria.   Musculoskeletal: Negative for back pain.   Skin: Negative for rash.   Neurological: Negative for weakness.   Hematological: Does not bruise/bleed easily.       Physical Exam     Initial Vitals   BP Pulse Resp Temp SpO2   07/04/17 1126 07/04/17 1126 07/04/17 1530 07/04/17 1126 07/04/17 1126   (!) 148/71 (!) 112 16 97.9 °F (36.6 °C) 99 %      MAP       07/04/17 1126       96.67         Physical Exam    Nursing note and vitals reviewed.  Constitutional: She appears well-developed and well-nourished. No distress. " Amulet device released.   HENT:   Head: Normocephalic and atraumatic.   Right Ear: External ear normal.   Left Ear: External ear normal.   Nose: Nose normal.   Eyes: Conjunctivae are normal. Pupils are equal, round, and reactive to light. Right eye exhibits no discharge. Left eye exhibits no discharge.   Neck: Normal range of motion. Neck supple.   Cardiovascular: Normal rate, regular rhythm and normal heart sounds. Exam reveals no gallop and no friction rub.    No murmur heard.  Pulmonary/Chest: Breath sounds normal. She has no wheezes. She has no rhonchi. She has no rales.   Abdominal: Soft. Bowel sounds are normal. There is no tenderness. There is no rigidity, no rebound and no guarding.   Musculoskeletal: Normal range of motion.   Neurological: She is alert.   Skin: Skin is warm and dry.         ED Course   Procedures  Labs Reviewed   CBC W/ AUTO DIFFERENTIAL - Abnormal; Notable for the following:        Result Value    WBC 15.50 (*)     MCV 78 (*)     MCH 25.7 (*)     Platelets 373 (*)     MPV 8.6 (*)     Gran # 14.9 (*)     Lymph # 0.4 (*)     Mono # 0.2 (*)     Gran% 96.4 (*)     Lymph% 2.4 (*)     Mono% 1.1 (*)     All other components within normal limits   COMPREHENSIVE METABOLIC PANEL - Abnormal; Notable for the following:     CO2 22 (*)     Glucose 126 (*)     All other components within normal limits             Medical Decision Making:   History:   I obtained history from: someone other than patient.  Old Medical Records: I decided to obtain old medical records.  Clinical Tests:   Lab Tests: Ordered  Radiological Study: Ordered       APC / Resident Notes:   This is an emergent evaluation of a 50-year-old female who presents with vomiting and diarrhea.  She denied blood or mucus in her stools.  She denied attempted treatment.  She states son has similar symptoms.  She denied fever or chills.  Abdomen is soft and nontender.  There is no rebound or guarding.  Bowel sounds are normal.  I doubt small bowel obstruction.  Labs show  mild leukocytosis.  Renal function is normal.  Electrolytes reviewed.  KUB is negative.  She was given Zofran IV fluids with resolution of her symptoms.  She was able to tolerate by mouth intake.  I suspect her symptoms are secondary to gastritis. Instructed on bland diet. Discussed results with patient. Return precautions given. Patient is to follow up with their primary care provider. Case was discussed with Dr. Barahona who is in agreement with the plan of care. All questions answered.                 ED Course     Clinical Impression:   The primary encounter diagnosis was Vomiting and diarrhea. A diagnosis of Vomiting was also pertinent to this visit.                           Sharri Serrano PA-C  07/04/17 182

## 2023-01-24 NOTE — Clinical Note
A dressing was applied to the right femoral vein puncture site. Right venous access sheath site covered with sterile gauze and tegaderm.

## 2023-01-24 NOTE — ANESTHESIA PROCEDURE NOTES
Arterial    Diagnosis: a fib    Patient location during procedure: done out of OR  Procedure start time: 1/24/2023 12:40 PM  Timeout: 1/24/2023 12:40 PM  Procedure end time: 1/24/2023 12:40 PM    Staffing  Authorizing Provider: Sal Ricketts MD  Performing Provider: Sal Ricketts MD    Anesthesiologist was present at the time of the procedure.    Preanesthetic Checklist  Completed: patient identified, IV checked, site marked, risks and benefits discussed, surgical consent, monitors and equipment checked, pre-op evaluation, timeout performed and anesthesia consent givenArterial  Skin Prep: chlorhexidine gluconate  Orientation: left  Location: radial    Catheter Size: 20 G Insertion Attempts: 1  Assessment  Dressing: secured with tape  Patient: Tolerated well

## 2023-01-24 NOTE — Clinical Note
The sheath was inserted into the right femoral vein. Right femoral venous access dilated up to 18fr.

## 2023-01-24 NOTE — Clinical Note
A venogram was performed in the [facility]. The vessel was injected via hand injection  with 15 mL of contrast. Contrast injection to visualize [place] size, shape, morphology.

## 2023-01-24 NOTE — ANESTHESIA PREPROCEDURE EVALUATION
01/24/2023  Garrison Garrett is a 82 y.o., male with paroxysmal A fib and recent history of falls.  He is not a good candidate for anticoagulation, and is here for left atrial appendage occlusion device.  LVEF is WNL.  He is feeling like he is at his baseline this AM, calm and conversant.  He has not had issues with anesthesia in the past, and recenlty did well under general IV anesthesia for WISAM.      Pre-op Assessment    I have reviewed the Patient Summary Reports.     I have reviewed the Nursing Notes. I have reviewed the NPO Status.   I have reviewed the Medications.     Review of Systems  Anesthesia Hx:  No problems with previous Anesthesia  Denies Family Hx of Anesthesia complications.   Denies Personal Hx of Anesthesia complications.   Cardiovascular:   Exercise tolerance: poor Hypertension CAD  Dysrhythmias  CHF    Pulmonary:  Pulmonary Normal    Renal/:   Chronic Renal Disease        Physical Exam  General: Well nourished, Cooperative, Alert and Oriented    Airway:  Mallampati: II   Mouth Opening: Normal  TM Distance: Normal  Tongue: Normal  Neck ROM: Normal ROM    Dental:  Dentures, Partial Dentures    Chest/Lungs:  Clear to auscultation, Normal Respiratory Rate    Heart:  Rate: Normal  Rhythm: Regular Rhythm        Anesthesia Plan  Type of Anesthesia, risks & benefits discussed:    Anesthesia Type: Gen ETT  Intra-op Monitoring Plan: Standard ASA Monitors and Art Line  Post Op Pain Control Plan: multimodal analgesia  Induction:  IV  Informed Consent: Informed consent signed with the Patient and all parties understand the risks and agree with anesthesia plan.  All questions answered.   ASA Score: 3  Day of Surgery Review of History & Physical: H&P Update referred to the surgeon/provider.    Ready For Surgery From Anesthesia Perspective.     .

## 2023-01-24 NOTE — TRANSFER OF CARE
"Anesthesia Transfer of Care Note    Patient: Garrison Garrett    Procedure(s) Performed: Procedure(s) (LRB):  CLOSURE, LEFT ATRIAL APPENDAGE, USING DEVICE (N/A)  ECHOCARDIOGRAM,TRANSESOPHAGEAL (N/A)    Patient location: PACU    Transport from OR: Transported from OR on room air with adequate spontaneous ventilation    Post pain: adequate analgesia    Post assessment: no apparent anesthetic complications and tolerated procedure well    Post vital signs: stable    Level of consciousness: awake and alert    Nausea/Vomiting: no nausea/vomiting    Complications: none    Transfer of care protocol was followed      Last vitals:   Visit Vitals  /62   Pulse 78   Temp 36.7 °C (98.1 °F)   Resp 13   Ht 5' 7" (1.702 m)   Wt 82 kg (180 lb 12.4 oz)   SpO2 98%   BMI 28.31 kg/m²     "

## 2023-01-24 NOTE — ANESTHESIA POSTPROCEDURE EVALUATION
Anesthesia Post Evaluation    Patient: Garrison Garrett    Procedure(s) Performed: Procedure(s) (LRB):  CLOSURE, LEFT ATRIAL APPENDAGE, USING DEVICE (N/A)  ECHOCARDIOGRAM,TRANSESOPHAGEAL (N/A)    Final Anesthesia Type: general      Patient location during evaluation: PACU  Patient participation: Yes- Able to Participate  Level of consciousness: awake and alert  Post-procedure vital signs: reviewed and stable  Pain management: adequate  Airway patency: patent  MACIE mitigation strategies: Verification of full reversal of neuromuscular block, Extubation and recovery carried out in lateral, semiupright, or other nonsupine position, Postoperative administration of CPAP, nasopharyngeal airway, or oral appliance in the postanesthesia care unit (PACU) and Multimodal analgesia  PONV status at discharge: No PONV  Anesthetic complications: no      Cardiovascular status: hemodynamically stable  Respiratory status: unassisted  Hydration status: euvolemic  Follow-up not needed.          Vitals Value Taken Time   /63 01/24/23 1511   Temp 36.7 °C (98.1 °F) 01/24/23 1350   Pulse 73 01/24/23 1519   Resp 18 01/24/23 1518   SpO2 96 % 01/24/23 1519   Vitals shown include unvalidated device data.      No case tracking events are documented in the log.      Pain/Hieu Score: Hieu Score: 10 (1/24/2023  2:50 PM)

## 2023-01-24 NOTE — ANESTHESIA PROCEDURE NOTES
Intubation    Date/Time: 1/24/2023 12:32 PM  Performed by: Ignacio Marin CRNA  Authorized by: Sal Ricketts MD     Intubation:     Induction:  Intravenous    Intubated:  Postinduction    Mask Ventilation:  Easy with oral airway    Attempts:  1    Attempted By:  CRNA    Method of Intubation:  Direct    Blade:  Jaja 3    Laryngeal View Grade: Grade I - full view of cords      Difficult Airway Encountered?: No      Complications:  None    Airway Device:  Oral endotracheal tube    Airway Device Size:  7.5    Style/Cuff Inflation:  Cuffed (inflated to minimal occlusive pressure)    Inflation Amount (mL):  6    Tube secured:  23    Secured at:  The lips    Placement Verified By:  Capnometry    Complicating Factors:  None    Findings Post-Intubation:  BS equal bilateral

## 2023-01-24 NOTE — Clinical Note
DEVICE AMULET 25MM was deployed to the left atrial appendage. Amulet device inserted and deployed under flouroscopy with WISAM assistance and Amulet rep supervision.

## 2023-01-24 NOTE — Clinical Note
All sedation/medication and vitals /monitoring per anesthesia- please see anesthesia record for all details.

## 2023-01-25 VITALS
TEMPERATURE: 98 F | BODY MASS INDEX: 28.37 KG/M2 | RESPIRATION RATE: 17 BRPM | DIASTOLIC BLOOD PRESSURE: 70 MMHG | HEART RATE: 60 BPM | HEIGHT: 67 IN | SYSTOLIC BLOOD PRESSURE: 147 MMHG | WEIGHT: 180.75 LBS | OXYGEN SATURATION: 97 %

## 2023-01-25 LAB
ANION GAP SERPL CALC-SCNC: 9 MEQ/L
AV INDEX (PROSTH): 0.66
AV MEAN GRADIENT: 10 MMHG
AV PEAK GRADIENT: 17 MMHG
AV REGURGITATION PRESSURE HALF TIME: 435 MS
AV VALVE AREA: 1.86 CM2
AV VELOCITY RATIO: 0.65
BASOPHILS # BLD AUTO: 0.01 X10(3)/MCL (ref 0–0.2)
BASOPHILS NFR BLD AUTO: 0.1 %
BSA FOR ECHO PROCEDURE: 1.97 M2
BUN SERPL-MCNC: 18.9 MG/DL (ref 8.4–25.7)
CALCIUM SERPL-MCNC: 8.5 MG/DL (ref 8.8–10)
CHLORIDE SERPL-SCNC: 103 MMOL/L (ref 98–107)
CO2 SERPL-SCNC: 23 MMOL/L (ref 23–31)
CREAT SERPL-MCNC: 1.27 MG/DL (ref 0.73–1.18)
CREAT/UREA NIT SERPL: 15
CV ECHO LV RWT: 0.46 CM
DOP CALC AO PEAK VEL: 2.06 M/S
DOP CALC AO VTI: 44.4 CM
DOP CALC LVOT AREA: 2.8 CM2
DOP CALC LVOT DIAMETER: 1.9 CM
DOP CALC LVOT PEAK VEL: 1.34 M/S
DOP CALC LVOT STROKE VOLUME: 82.47 CM3
DOP CALC MV VTI: 54.5 CM
DOP CALCLVOT PEAK VEL VTI: 29.1 CM
E WAVE DECELERATION TIME: 272 MSEC
E/A RATIO: 1.91
ECHO LV POSTERIOR WALL: 1.21 CM (ref 0.6–1.1)
EJECTION FRACTION: 60 %
EOSINOPHIL # BLD AUTO: 0 X10(3)/MCL (ref 0–0.9)
EOSINOPHIL NFR BLD AUTO: 0 %
ERYTHROCYTE [DISTWIDTH] IN BLOOD BY AUTOMATED COUNT: 14.6 % (ref 11.5–17)
FRACTIONAL SHORTENING: 29 % (ref 28–44)
GFR SERPLBLD CREATININE-BSD FMLA CKD-EPI: 57 MLS/MIN/1.73/M2
GLUCOSE SERPL-MCNC: 178 MG/DL (ref 82–115)
HCT VFR BLD AUTO: 34.1 % (ref 42–52)
HGB BLD-MCNC: 11.4 GM/DL (ref 14–18)
IMM GRANULOCYTES # BLD AUTO: 0.05 X10(3)/MCL (ref 0–0.04)
IMM GRANULOCYTES NFR BLD AUTO: 0.6 %
INTERVENTRICULAR SEPTUM: 1.38 CM (ref 0.6–1.1)
LEFT ATRIUM SIZE: 4.2 CM
LEFT INTERNAL DIMENSION IN SYSTOLE: 3.75 CM (ref 2.1–4)
LEFT VENTRICLE DIASTOLIC VOLUME INDEX: 68.86 ML/M2
LEFT VENTRICLE DIASTOLIC VOLUME: 133.58 ML
LEFT VENTRICLE MASS INDEX: 146 G/M2
LEFT VENTRICLE SYSTOLIC VOLUME INDEX: 30.9 ML/M2
LEFT VENTRICLE SYSTOLIC VOLUME: 60.02 ML
LEFT VENTRICULAR INTERNAL DIMENSION IN DIASTOLE: 5.27 CM (ref 3.5–6)
LEFT VENTRICULAR MASS: 282.83 G
LVOT MG: 4 MMHG
LVOT MV: 0.93 CM/S
LYMPHOCYTES # BLD AUTO: 0.47 X10(3)/MCL (ref 0.6–4.6)
LYMPHOCYTES NFR BLD AUTO: 5.9 %
MCH RBC QN AUTO: 31.1 PG
MCHC RBC AUTO-ENTMCNC: 33.4 MG/DL (ref 33–36)
MCV RBC AUTO: 92.9 FL (ref 80–94)
MONOCYTES # BLD AUTO: 0.55 X10(3)/MCL (ref 0.1–1.3)
MONOCYTES NFR BLD AUTO: 7 %
MV MEAN GRADIENT: 3 MMHG
MV PEAK A VEL: 0.8 M/S
MV PEAK E VEL: 1.53 M/S
MV PEAK GRADIENT: 9 MMHG
MV STENOSIS PRESSURE HALF TIME: 104 MS
MV VALVE AREA BY CONTINUITY EQUATION: 1.51 CM2
MV VALVE AREA P 1/2 METHOD: 2.12 CM2
NEUTROPHILS # BLD AUTO: 6.82 X10(3)/MCL (ref 2.1–9.2)
NEUTROPHILS NFR BLD AUTO: 86.4 %
NRBC BLD AUTO-RTO: 0 %
PISA AR MAX VEL: 3.79 M/S
PISA TR MAX VEL: 1.44 M/S
PLATELET # BLD AUTO: 188 X10(3)/MCL (ref 130–400)
PMV BLD AUTO: 10.7 FL (ref 7.4–10.4)
POTASSIUM SERPL-SCNC: 3.9 MMOL/L (ref 3.5–5.1)
PV PEAK VELOCITY: 1.36 CM/S
RBC # BLD AUTO: 3.67 X10(6)/MCL (ref 4.7–6.1)
RIGHT VENTRICULAR END-DIASTOLIC DIMENSION: 2.72 CM
SODIUM SERPL-SCNC: 135 MMOL/L (ref 136–145)
TR MAX PG: 8 MMHG
WBC # SPEC AUTO: 7.9 X10(3)/MCL (ref 4.5–11.5)

## 2023-01-25 PROCEDURE — 85025 COMPLETE CBC W/AUTO DIFF WBC: CPT | Performed by: INTERNAL MEDICINE

## 2023-01-25 PROCEDURE — 63600175 PHARM REV CODE 636 W HCPCS: Performed by: ANESTHESIOLOGY

## 2023-01-25 PROCEDURE — 36415 COLL VENOUS BLD VENIPUNCTURE: CPT | Performed by: INTERNAL MEDICINE

## 2023-01-25 PROCEDURE — 80048 BASIC METABOLIC PNL TOTAL CA: CPT | Performed by: INTERNAL MEDICINE

## 2023-01-25 PROCEDURE — 25000003 PHARM REV CODE 250: Performed by: INTERNAL MEDICINE

## 2023-01-25 RX ORDER — CLOPIDOGREL BISULFATE 75 MG/1
75 TABLET ORAL DAILY
Qty: 30 TABLET | Refills: 6 | Status: SHIPPED | OUTPATIENT
Start: 2023-01-25 | End: 2023-10-06

## 2023-01-25 RX ORDER — ASPIRIN 81 MG/1
81 TABLET ORAL DAILY
Qty: 90 TABLET | Refills: 3 | Status: SHIPPED | OUTPATIENT
Start: 2023-01-25 | End: 2024-02-29

## 2023-01-25 RX ORDER — AMIODARONE HYDROCHLORIDE 200 MG/1
200 TABLET ORAL DAILY
Qty: 90 TABLET | Refills: 3 | Status: SHIPPED | OUTPATIENT
Start: 2023-01-25

## 2023-01-25 RX ADMIN — ASPIRIN 81 MG: 81 TABLET, COATED ORAL at 10:01

## 2023-01-25 RX ADMIN — CLOPIDOGREL BISULFATE 75 MG: 75 TABLET ORAL at 10:01

## 2023-01-25 RX ADMIN — DIPHENHYDRAMINE HYDROCHLORIDE 25 MG: 50 INJECTION INTRAMUSCULAR; INTRAVENOUS at 12:01

## 2023-01-25 NOTE — NURSING
Nurses Note -- 4 Eyes      1/25/2023   11:55 AM      Skin assessed during: Admit      [x] No Pressure Injuries Present    []Prevention Measures Documented      [] Yes- Altered Skin Integrity Present or Discovered   [] LDA Added if Not in Epic (Describe Wound)   [] New Altered Skin Integrity was Present on Admit and Documented in LDA   [] Wound Image Taken    Wound Care Consulted? No    Attending Nurse:  Jossy Danielle LPN     Second RN/Staff Member: Lynne Tavera

## 2023-01-25 NOTE — DISCHARGE SUMMARY
[facility] - [facility] Medical Telemetry  Cardiology  Discharge Summary      Patient Name: Garrison Garrett  MRN: [id number]  Admission Date: 1/24/2023  Hospital Length of Stay: 1 days  Discharge Date and Time:  01/25/2023 7:59 AM  Attending Physician: Joce Layne MD  Discharging Provider: Gurinder Edwards MD  Primary Care Physician: Mike Mendoza MD    HPI:   Mr. Garrett is an 82 year old male who is known to [facility], Dr. Marroquin & Dr. Layne. He presents to [facility] for a scheduled left atrial appendage closure device via amulet procedure with Dr. Layne as a safe alternative to oral anticoagulation therapy for stroke prophylaxis and to reduce his bleeding risk long term treatment with Eliquis as necessary. See procedure below.    Hospital course:   1.25.23: NAD. Denies CP, SOB, or palps. Bilateral groin sites benign. VSS.    Procedure(s) (LRB):  CLOSURE, LEFT ATRIAL APPENDAGE, USING DEVICE (N/A)  ECHOCARDIOGRAM,TRANSESOPHAGEAL (N/A)   The estimated blood loss was <50 mL.  The left atrial appendage closure was successful with Emulet device.  The left atrial appendage closure was successful  with a DEVICE AMULET 25MM device.  Recs:  Routine post-cath care.  ASA 81mg.  Clopidogrel for six months.    Final Active Diagnoses:    Diagnosis Date Noted POA    PRINCIPAL PROBLEM:  Paroxysmal atrial fibrillation [I48.0] 01/24/2023 Yes      Problems Resolved During this Admission:       Discharged Condition: stable    Review of Systems   Cardiovascular:  Negative for chest pain and palpitations.   Respiratory:  Negative for shortness of breath.    All other systems reviewed and are negative.    Physical Exam  HENT:      Head: Normocephalic.      Nose: Nose normal.      Mouth/Throat:      Mouth: Mucous membranes are dry.   Eyes:      Extraocular Movements: Extraocular movements intact.   Cardiovascular:      Rate and Rhythm: Normal rate and regular rhythm.      Pulses: Normal pulses.      Heart sounds: Normal heart  sounds.      Comments: Bilateral groin sites, soft, nontender. No hematoma noted. +2 peripheral pulse  Pulmonary:      Effort: Pulmonary effort is normal.      Breath sounds: Normal breath sounds.   Abdominal:      Palpations: Abdomen is soft.   Musculoskeletal:         General: Normal range of motion.   Skin:     General: Skin is warm and dry.   Neurological:      Mental Status: He is alert and oriented to person, place, and time.   Psychiatric:         Behavior: Behavior normal.       Disposition: Home or Self Care    Follow Up:   Follow-up Information       Joce Layne MD Follow up on 2/10/2023.    Specialties: Cardiology, Pathology  Why: hospital f/u @ 9:20 am  Please schedule BMP on Friday  Contact information:  [address]  [place] [id number]                           Patient Instructions:      Diet Cardiac     Lifting restrictions   Order Comments: Nothing > 10 lbs x 3 days     No driving until:   Order Comments: X 3 days     Notify your health care provider if you experience any of the following:  temperature >100.4     Notify your health care provider if you experience any of the following:  redness, tenderness, or signs of infection (pain, swelling, redness, odor or green/yellow discharge around incision site)     No dressing needed   Order Comments: Shower daily w/ antibacterial soap     Activity as tolerated     Medications:  Reconciled Home Medications:      Medication List        START taking these medications      aspirin 81 MG EC tablet  Commonly known as: ECOTRIN  Take 1 tablet (81 mg total) by mouth once daily.     clopidogreL 75 mg tablet  Commonly known as: PLAVIX  Take 1 tablet (75 mg total) by mouth once daily.            CONTINUE taking these medications      amiodarone 200 MG Tab  Commonly known as: PACERONE  Take 200 mg by mouth once daily.     atorvastatin 40 MG tablet  Commonly known as: LIPITOR  Take 40 mg by mouth every evening.     doxazosin 2 MG tablet  Commonly known  as: CARDURA  Take 2 mg by mouth.     ENTRESTO 24-26 mg per tablet  Generic drug: sacubitriL-valsartan  Take 1 tablet by mouth 2 (two) times daily.     furosemide 40 MG tablet  Commonly known as: LASIX  Take 40 mg by mouth every Mon, Wed, Fri.            STOP taking these medications      lisinopriL 20 MG tablet  Commonly known as: PRINIVIL,ZESTRIL              Impression:  PAF - now SR    - CHADSVASC Score 4 Points    - s/p Amulet procedure 1.24.23  Chronic diastolic CHF  CAD  HTN  HLD    Plan:   Pt is stable for discharge home.  Plan for anticoagulation post LAAO implant will be to start ASA & plavix with repeat WISAM within 30-45 days post implant. If no device leaks are noted madelaine will continue ASA and plavix for 6 months followed by ASA thereafter.   Groin precautions reviewed.   Continue other cardiac meds.   BMP in 2 days.   Keep scheduled appointment with Dr. Layne on 2/10/23 @ 9:20 am.     Time spent on the discharge of patient: 32 minutes    Gurinder Edwards MD  Cardiology  [facility] - [facility] Medical Telemetry

## 2023-01-26 ENCOUNTER — PATIENT OUTREACH (OUTPATIENT)
Dept: ADMINISTRATIVE | Facility: CLINIC | Age: 83
End: 2023-01-26
Payer: MEDICARE

## 2023-01-27 NOTE — PROGRESS NOTES
C3 nurse spoke with Garrison Garrett for a TCC post hospital discharge follow up call. The patient has a scheduled HOSFU appointment with  Joce Layne MD (Cardiology) on 2/10/2023; hospital f/u @ 9:20.

## 2023-01-29 LAB
ABO + RH BLD: NORMAL
ABO + RH BLD: NORMAL
BLD PROD TYP BPU: NORMAL
BLD PROD TYP BPU: NORMAL
BLOOD UNIT EXPIRATION DATE: NORMAL
BLOOD UNIT EXPIRATION DATE: NORMAL
BLOOD UNIT TYPE CODE: 5100
BLOOD UNIT TYPE CODE: 5100
CROSSMATCH INTERPRETATION: NORMAL
CROSSMATCH INTERPRETATION: NORMAL
DISPENSE STATUS: NORMAL
DISPENSE STATUS: NORMAL
UNIT NUMBER: NORMAL
UNIT NUMBER: NORMAL

## 2023-02-01 NOTE — PHYSICIAN QUERY
PT Name: Garrison Garrett  MR #: [id number]    DOCUMENTATION CLARIFICATION     CDS/: Ashlie Benitez RN              Contact information: [email or web address]   Query withdrawn due to wrong encounter    This form is a permanent document in the medical record.     Query Date: February 1, 2023    By submitting this query, we are merely seeking further clarification of documentation. Please utilize your independent clinical judgment when addressing the question(s) below.    The Medical Record contains the following:   Indicators Supporting Clinical Findings Location in Medical Record   X Atrial Fibrillation 83 y/o male w/ Per AF s/p DCCV, CAD, HTN, heart failure. Patient presents for Left atrial appendage closure device.     Impression:  Atrial fibrillation, persistent- AFib      Garrison Grarett is a 82 y.o., male with paroxysmal A fib and recent history of falls.  He is not a good candidate for anticoagulation, and is here for left atrial appendage occlusion device.  LVEF is WNL.  He is feeling like he is at his baseline this AM, calm and conversant.  He has not had issues with anesthesia in the past, and recenlty did well under general IV anesthesia for WISAM    Final Active Diagnoses:  PRINCIPAL PROBLEM:  Paroxysmal atrial fibrillation   H&P 1/24      H&P 1/24        Anes Note 1/24            DCS 1/25     X EKG Results Sinus rhythm with 1st degree A-V block   Left axis deviation   Right bundle branch block   Abnormal ECG  EKG 1/24              Medication     X Treatment Procedure(s) (LRB):  CLOSURE, LEFT ATRIAL APPENDAGE, USING DEVICE (N/A)  ECHOCARDIOGRAM,TRANSESOPHAGEAL (N/A)  The estimated blood loss was <50 mL.  The left atrial appendage closure was successful with Emulet device.  The left atrial appendage closure was successful  with a DEVICE AMULET 25MM device.   DCS 1/25    Other       The clinical guidelines noted are only a system guideline. It does not replace the provider's clinical  judgment.    Provider,due to conflicting documentation, please clarify the type of Atrial Fibrillation (AF):    [  ] Paroxysmal - defined as AF that terminates spontaneously or with intervention within < 7 days of onset, episodes may recur with variable frequency     [  ] Other Persistent - defined as AF that sustained for ?7 days; Episodes often require pharmacologic or electrical cardioversion to restore sinus rhythm     [  ] Other cardiac diagnosis (please specify): ____________               Please document in your progress notes daily for the duration of treatment until resolved, and include in your discharge summary.    Reference:  SCAR Moore MD. (2020, September 14). Overview of atrial fibrillation (RODRÍGUEZ Hernandez MD & ANA Coburn MD, Eds.). Retrieved October 22, 2020, from [email or web address]-fibrillation?search=atrial%20fibrillation&source=search_result&selectedTitle=1~150&usage_type=default&display_rank=1    Form No. 80571

## 2023-02-03 NOTE — PHYSICIAN QUERY
PT Name: Garrison Garrett  MR #: [id number]    DOCUMENTATION CLARIFICATION     CDS/: Ashlie Benitez RN              Contact information: [email or web address]     This form is a permanent document in the medical record.     Query Date: February 3, 2023    By submitting this query, we are merely seeking further clarification of documentation. Please utilize your independent clinical judgment when addressing the question(s) below.    The Medical Record contains the following:   Indicators Supporting Clinical Findings Location in Medical Record   X Atrial Fibrillation 81 y/o male w/ Per AF s/p DCCV, CAD, HTN, heart failure. Patient presents for Left atrial appendage closure device.     Impression:  Atrial fibrillation, persistent- AFib      Garrison Garrett is a 82 y.o., male with paroxysmal A fib and recent history of falls.  He is not a good candidate for anticoagulation, and is here for left atrial appendage occlusion device.  LVEF is WNL.  He is feeling like he is at his baseline this AM, calm and conversant.  He has not had issues with anesthesia in the past, and recenlty did well under general IV anesthesia for WISAM    Final Active Diagnoses:  PRINCIPAL PROBLEM:  Paroxysmal atrial fibrillation   H&P 1/24      H&P 1/24        Anes Note 1/24            DCS 1/25     X EKG Results Sinus rhythm with 1st degree A-V block   Left axis deviation   Right bundle branch block   Abnormal ECG  EKG 1/24              Medication     X Treatment Procedure(s) (LRB):  CLOSURE, LEFT ATRIAL APPENDAGE, USING DEVICE (N/A)  ECHOCARDIOGRAM,TRANSESOPHAGEAL (N/A)  The estimated blood loss was <50 mL.  The left atrial appendage closure was successful with Emulet device.  The left atrial appendage closure was successful  with a DEVICE AMULET 25MM device.   DCS 1/25    Other         The clinical guidelines noted are only a system guideline. It does not replace the provider's clinical judgment.    Provider,due to conflicting  documentation, please clarify the type of Atrial Fibrillation (AF):    [x] Paroxysmal - defined as AF that terminates spontaneously or with intervention within < 7 days of onset, episodes may recur with variable frequency     [  ] Other Persistent - defined as AF that sustained for =7 days; Episodes often require pharmacologic or electrical cardioversion to restore sinus rhythm     [  ] Other cardiac diagnosis (please specify): ____________             Please document in your progress notes daily for the duration of treatment until resolved, and include in your discharge summary.    Reference:  SCAR Moore MD. (2020, September 14). Overview of atrial fibrillation (RODRÍGUEZ Hernandez MD & ANA Coburn MD, Eds.). Retrieved October 22, 2020, from [email or web address]/mafwwdcx-ov-nuwrxy-fibrillation?search=atrial%20fibrillation&source=search_result&selectedTitle=1~150&usage_type=default&display_rank=1    Form No. 82170

## 2023-02-07 ENCOUNTER — TELEPHONE (OUTPATIENT)
Dept: FAMILY MEDICINE | Facility: CLINIC | Age: 83
End: 2023-02-07
Payer: MEDICARE

## 2023-02-07 NOTE — TELEPHONE ENCOUNTER
----- Message from Michel Ji sent at 2/7/2023 11:08 AM CST -----  .Type:  Needs Medical Advice    Who Called: Garrison  Symptoms (please be specific):    How long has patient had these symptoms:    Pharmacy name and phone #:    Would the patient rather a call back or a response via [facility]?   Best Call Back Number: [phone]  Additional Information: He needs a new form to renew his handicap sticker. Please give him a call and see when he can go pick that up.

## 2023-02-07 NOTE — TELEPHONE ENCOUNTER
Phoned pt. Informed that form for handicap sticker is ready for . Stated that he will  in the morning to bring to the [employer].

## 2023-02-23 DIAGNOSIS — I48.19 PERSISTENT ATRIAL FIBRILLATION: Primary | ICD-10-CM

## 2023-03-02 ENCOUNTER — ANESTHESIA (OUTPATIENT)
Dept: CARDIOLOGY | Facility: HOSPITAL | Age: 83
End: 2023-03-02
Payer: MEDICARE

## 2023-03-02 ENCOUNTER — ANESTHESIA EVENT (OUTPATIENT)
Dept: CARDIOLOGY | Facility: HOSPITAL | Age: 83
End: 2023-03-02
Payer: MEDICARE

## 2023-03-02 ENCOUNTER — HOSPITAL ENCOUNTER (OUTPATIENT)
Dept: CARDIOLOGY | Facility: HOSPITAL | Age: 83
Discharge: HOME | End: 2023-03-02
Attending: INTERNAL MEDICINE
Payer: MEDICARE

## 2023-03-02 VITALS
HEART RATE: 64 BPM | RESPIRATION RATE: 23 BRPM | SYSTOLIC BLOOD PRESSURE: 150 MMHG | WEIGHT: 180.75 LBS | HEIGHT: 67 IN | BODY MASS INDEX: 28.37 KG/M2 | TEMPERATURE: 99 F | DIASTOLIC BLOOD PRESSURE: 73 MMHG | OXYGEN SATURATION: 96 %

## 2023-03-02 DIAGNOSIS — Z01.818 PREOP TESTING: ICD-10-CM

## 2023-03-02 DIAGNOSIS — I48.0 PAF (PAROXYSMAL ATRIAL FIBRILLATION): ICD-10-CM

## 2023-03-02 DIAGNOSIS — I48.19 PERSISTENT ATRIAL FIBRILLATION: ICD-10-CM

## 2023-03-02 LAB — BSA FOR ECHO PROCEDURE: 1.97 M2

## 2023-03-02 PROCEDURE — 63600175 PHARM REV CODE 636 W HCPCS: Performed by: NURSE ANESTHETIST, CERTIFIED REGISTERED

## 2023-03-02 PROCEDURE — 93325 DOPPLER ECHO COLOR FLOW MAPG: CPT

## 2023-03-02 PROCEDURE — 25000003 PHARM REV CODE 250: Performed by: NURSE ANESTHETIST, CERTIFIED REGISTERED

## 2023-03-02 PROCEDURE — 37000008 HC ANESTHESIA 1ST 15 MINUTES

## 2023-03-02 PROCEDURE — 93005 ELECTROCARDIOGRAM TRACING: CPT

## 2023-03-02 PROCEDURE — 93010 EKG 12-LEAD: ICD-10-PCS | Mod: ,,, | Performed by: INTERNAL MEDICINE

## 2023-03-02 PROCEDURE — 93010 ELECTROCARDIOGRAM REPORT: CPT | Mod: ,,, | Performed by: INTERNAL MEDICINE

## 2023-03-02 RX ORDER — PROPOFOL 10 MG/ML
VIAL (ML) INTRAVENOUS
Status: COMPLETED
Start: 2023-03-02 | End: 2023-03-02

## 2023-03-02 RX ORDER — PHENYLEPHRINE HYDROCHLORIDE 10 MG/ML
INJECTION INTRAVENOUS
Status: DISCONTINUED
Start: 2023-03-02 | End: 2023-03-02 | Stop reason: WASHOUT

## 2023-03-02 RX ORDER — PROPOFOL 10 MG/ML
VIAL (ML) INTRAVENOUS
Status: DISCONTINUED | OUTPATIENT
Start: 2023-03-02 | End: 2023-03-02

## 2023-03-02 RX ADMIN — SODIUM CHLORIDE, SODIUM GLUCONATE, SODIUM ACETATE, POTASSIUM CHLORIDE AND MAGNESIUM CHLORIDE: 526; 502; 368; 37; 30 INJECTION, SOLUTION INTRAVENOUS at 07:03

## 2023-03-02 RX ADMIN — PROPOFOL 60 MG: 10 INJECTION, EMULSION INTRAVENOUS at 07:03

## 2023-03-02 NOTE — TRANSFER OF CARE
"Anesthesia Transfer of Care Note    Patient: Garrison Garrett    Procedure(s) Performed: * No procedures listed *    Patient location: PACU    Anesthesia Type: MAC    Transport from OR: Transported from OR on room air with adequate spontaneous ventilation    Post pain: adequate analgesia    Post assessment: no apparent anesthetic complications    Post vital signs: stable    Level of consciousness: awake    Nausea/Vomiting: no nausea/vomiting    Complications: none    Transfer of care protocol was followed      Last vitals:   Visit Vitals  BP (!) 182/83   Pulse 69   Temp 37.1 °C (98.7 °F) (Oral)   Ht 5' 7" (1.702 m)   Wt 82 kg (180 lb 12.4 oz)   SpO2 96%   BMI 28.31 kg/m²     "

## 2023-03-02 NOTE — ANESTHESIA PREPROCEDURE EVALUATION
03/02/2023  Garrison Garrett is a 82 y.o., male.  S/p ammarlont, here for repeat WISAM    Pre-op Assessment    I have reviewed the Patient Summary Reports.     I have reviewed the Nursing Notes. I have reviewed the NPO Status.   I have reviewed the Medications.     Review of Systems  Anesthesia Hx:  No problems with previous Anesthesia    Social:  Non-Smoker    Cardiovascular:   Hypertension CAD  Dysrhythmias  CHF    Renal/:   Chronic Renal Disease        Physical Exam  General: Well nourished, Cooperative, Alert and Oriented    Airway:  Mallampati: II   Mouth Opening: Normal  TM Distance: Normal  Tongue: Normal  Neck ROM: Normal ROM    Dental:  Edentulous        Anesthesia Plan  Type of Anesthesia, risks & benefits discussed:    Anesthesia Type: Gen Natural Airway  Intra-op Monitoring Plan: Standard ASA Monitors  Post Op Pain Control Plan: multimodal analgesia  Induction:  IV  Informed Consent: Informed consent signed with the Patient and all parties understand the risks and agree with anesthesia plan.  All questions answered. Patient consented to blood products? Yes  ASA Score: 3  Day of Surgery Review of History & Physical: H&P Update referred to the surgeon/provider.    Ready For Surgery From Anesthesia Perspective.     .

## 2023-03-02 NOTE — DISCHARGE SUMMARY
[facility] - Cardiology Diagnostics  Discharge Note  Short Stay    Transesophageal echo (WISAM)      OUTCOME: Patient tolerated treatment/procedure well without complication and is now ready for discharge.    DISPOSITION: Home or Self Care    FINAL DIAGNOSIS:  AF    FOLLOWUP: In clinic    DISCHARGE INSTRUCTIONS:  No discharge procedures on file.     TIME SPENT ON DISCHARGE: 15 minutes

## 2023-03-03 NOTE — ANESTHESIA POSTPROCEDURE EVALUATION
Anesthesia Post Evaluation    Patient: Garrison Garrett    Procedure(s) Performed: * No procedures listed *    Final Anesthesia Type: general      Patient location during evaluation: PACU  Patient participation: Yes- Able to Participate  Level of consciousness: awake and alert  Post-procedure vital signs: reviewed and stable  Pain management: adequate  Airway patency: patent      Anesthetic complications: no      Cardiovascular status: hemodynamically stable  Respiratory status: unassisted  Hydration status: euvolemic  Follow-up not needed.          Vitals Value Taken Time   /73 03/02/23 0916   Temp 36.7 03/03/23 0730   Pulse 65 03/02/23 0902   Resp 22 03/02/23 0902   SpO2 97 % 03/02/23 0902   Vitals shown include unvalidated device data.      No case tracking events are documented in the log.      Pain/Hieu Score: Hieu Score: 10 (3/2/2023  9:25 AM)

## 2023-03-06 ENCOUNTER — DOCUMENTATION ONLY (OUTPATIENT)
Dept: FAMILY MEDICINE | Facility: CLINIC | Age: 83
End: 2023-03-06
Payer: MEDICARE

## 2023-03-06 LAB
CHOLEST SERPL-MSCNC: 159 MG/DL (ref 0–200)
HDLC SERPL-MCNC: 50 MG/DL (ref 35–70)
LDLC SERPL CALC-MCNC: 88 MG/DL (ref 0–160)
TRIGL SERPL-MCNC: 118 MG/DL (ref 40–160)

## 2023-04-06 ENCOUNTER — OFFICE VISIT (OUTPATIENT)
Dept: FAMILY MEDICINE | Facility: CLINIC | Age: 83
End: 2023-04-06
Payer: MEDICARE

## 2023-04-06 VITALS
HEART RATE: 60 BPM | DIASTOLIC BLOOD PRESSURE: 77 MMHG | RESPIRATION RATE: 20 BRPM | OXYGEN SATURATION: 96 % | HEIGHT: 67 IN | BODY MASS INDEX: 28.69 KG/M2 | SYSTOLIC BLOOD PRESSURE: 161 MMHG | TEMPERATURE: 98 F | WEIGHT: 182.81 LBS

## 2023-04-06 DIAGNOSIS — N18.2 CHRONIC KIDNEY DISEASE, STAGE 2 (MILD): Chronic | ICD-10-CM

## 2023-04-06 DIAGNOSIS — I10 PRIMARY HYPERTENSION: Primary | Chronic | ICD-10-CM

## 2023-04-06 DIAGNOSIS — E78.2 MIXED HYPERLIPIDEMIA: Chronic | ICD-10-CM

## 2023-04-06 DIAGNOSIS — Z12.5 PROSTATE CANCER SCREENING: ICD-10-CM

## 2023-04-06 DIAGNOSIS — I48.21 PERMANENT ATRIAL FIBRILLATION: Chronic | ICD-10-CM

## 2023-04-06 PROCEDURE — 1159F MED LIST DOCD IN RCRD: CPT | Mod: CPTII,,, | Performed by: FAMILY MEDICINE

## 2023-04-06 PROCEDURE — 1160F PR REVIEW ALL MEDS BY PRESCRIBER/CLIN PHARMACIST DOCUMENTED: ICD-10-PCS | Mod: CPTII,,, | Performed by: FAMILY MEDICINE

## 2023-04-06 PROCEDURE — 1159F PR MEDICATION LIST DOCUMENTED IN MEDICAL RECORD: ICD-10-PCS | Mod: CPTII,,, | Performed by: FAMILY MEDICINE

## 2023-04-06 PROCEDURE — 3288F FALL RISK ASSESSMENT DOCD: CPT | Mod: CPTII,,, | Performed by: FAMILY MEDICINE

## 2023-04-06 PROCEDURE — 1126F PR PAIN SEVERITY QUANTIFIED, NO PAIN PRESENT: ICD-10-PCS | Mod: CPTII,,, | Performed by: FAMILY MEDICINE

## 2023-04-06 PROCEDURE — 3077F PR MOST RECENT SYSTOLIC BLOOD PRESSURE >= 140 MM HG: ICD-10-PCS | Mod: CPTII,,, | Performed by: FAMILY MEDICINE

## 2023-04-06 PROCEDURE — 99214 PR OFFICE/OUTPT VISIT, EST, LEVL IV, 30-39 MIN: ICD-10-PCS | Mod: ,,, | Performed by: FAMILY MEDICINE

## 2023-04-06 PROCEDURE — 3288F PR FALLS RISK ASSESSMENT DOCUMENTED: ICD-10-PCS | Mod: CPTII,,, | Performed by: FAMILY MEDICINE

## 2023-04-06 PROCEDURE — 3078F DIAST BP <80 MM HG: CPT | Mod: CPTII,,, | Performed by: FAMILY MEDICINE

## 2023-04-06 PROCEDURE — 1126F AMNT PAIN NOTED NONE PRSNT: CPT | Mod: CPTII,,, | Performed by: FAMILY MEDICINE

## 2023-04-06 PROCEDURE — 3077F SYST BP >= 140 MM HG: CPT | Mod: CPTII,,, | Performed by: FAMILY MEDICINE

## 2023-04-06 PROCEDURE — 1101F PR PT FALLS ASSESS DOC 0-1 FALLS W/OUT INJ PAST YR: ICD-10-PCS | Mod: CPTII,,, | Performed by: FAMILY MEDICINE

## 2023-04-06 PROCEDURE — 99214 OFFICE O/P EST MOD 30 MIN: CPT | Mod: ,,, | Performed by: FAMILY MEDICINE

## 2023-04-06 PROCEDURE — 1160F RVW MEDS BY RX/DR IN RCRD: CPT | Mod: CPTII,,, | Performed by: FAMILY MEDICINE

## 2023-04-06 PROCEDURE — 3078F PR MOST RECENT DIASTOLIC BLOOD PRESSURE < 80 MM HG: ICD-10-PCS | Mod: CPTII,,, | Performed by: FAMILY MEDICINE

## 2023-04-06 PROCEDURE — 1101F PT FALLS ASSESS-DOCD LE1/YR: CPT | Mod: CPTII,,, | Performed by: FAMILY MEDICINE

## 2023-04-06 NOTE — PROGRESS NOTES
Subjective:      Patient ID: Garrison Garrett is a 82 y.o. male.    Chief Complaint: Hypertension and Hyperlipidemia    Patient is here for 6 month surveillance visit on chronic medical conditions without complaints. Sees cardiologist, who is manipulating medications to address patient's yasmani blood pressure.     Problem List Items Addressed This Visit       Hypertension - Primary (Chronic)    Relevant Orders    CBC Auto Differential    Comprehensive Metabolic Panel    Lipid Panel    Urinalysis    Mixed hyperlipidemia (Chronic)    Relevant Orders    Comprehensive Metabolic Panel    Lipid Panel    Permanent atrial fibrillation (Chronic)    Relevant Orders    CBC Auto Differential    Comprehensive Metabolic Panel    Chronic kidney disease, stage 2 (mild) (Chronic)    Overview     Last Assessment & Plan:   Formatting of this note might be different from the original.  History & Physical   Creatinine 1.06  He follows with renal specialist  Avoid nephrotoxins and monitor creatinine levels  Discharge Summary     Follow-up           Relevant Orders    Comprehensive Metabolic Panel     Other Visit Diagnoses       Prostate cancer screening        Relevant Orders    PSA, Screening            The patient's Health Maintenance was reviewed and the following appears to be due:   Health Maintenance Due   Topic Date Due    Pneumococcal Vaccines (Age 65+) (1 - PCV) Never done    Shingles Vaccine (2 of 3) 06/08/2014    COVID-19 Vaccine (3 - Booster for Pfizer series) 04/07/2021       Past Medical History:  Past Medical History:   Diagnosis Date    Anticoagulant long-term use     Arthritis     CAD (coronary artery disease)     CHF (congestive heart failure)     HLD (hyperlipidemia)     [place] (hard of hearing)     HTN (hypertension)     Paroxysmal atrial fibrillation      Past Surgical History:   Procedure Laterality Date    CARDIOVERSION  04/12/2021    WISAM    CATARACT EXTRACTION Bilateral     CLOSURE OF LEFT ATRIAL APPENDAGE USING  DEVICE N/A 1/24/2023    Procedure: CLOSURE, LEFT ATRIAL APPENDAGE, USING DEVICE;  Surgeon: Joce Layne MD;  Location: [facility] CATH LAB;  Service: Cardiology;  Laterality: N/A;  AMULET W/ INTRA OP WISAM    ECHOCARDIOGRAM,TRANSESOPHAGEAL N/A 1/24/2023    Procedure: ECHOCARDIOGRAM,TRANSESOPHAGEAL;  Surgeon: Chu Diagnostic Provider;  Location: [facility] CATH LAB;  Service: Cardiology;  Laterality: N/A;    MULTIPLE TOOTH EXTRACTIONS      SINUS SURGERY       Review of patient's allergies indicates:   Allergen Reactions    Entresto [sacubitril-valsartan]     Hydrochlorothiazide      Other reaction(s): precaution     Current Outpatient Medications on File Prior to Visit   Medication Sig Dispense Refill    amiodarone (PACERONE) 200 MG Tab Take 1 tablet (200 mg total) by mouth once daily. 90 tablet 3    aspirin (ECOTRIN) 81 MG EC tablet Take 1 tablet (81 mg total) by mouth once daily. 90 tablet 3    atorvastatin (LIPITOR) 40 MG tablet Take 40 mg by mouth once daily.      clopidogreL (PLAVIX) 75 mg tablet Take 1 tablet (75 mg total) by mouth once daily. 30 tablet 6    furosemide (LASIX) 40 MG tablet Take 40 mg by mouth every Mon, Wed, Fri.      sacubitriL-valsartan (ENTRESTO) 24-26 mg per tablet Take 1 tablet by mouth once daily.      doxazosin (CARDURA) 2 MG tablet Take 2 mg by mouth.       No current facility-administered medications on file prior to visit.     Social History     Socioeconomic History    Marital status:    Tobacco Use    Smoking status: Former     Types: Cigarettes     Passive exposure: Never    Smokeless tobacco: Never    Tobacco comments:     Quit 30 - 35 years ago   Substance and Sexual Activity    Alcohol use: Yes     Alcohol/week: 42.0 standard drinks     Types: 42 Cans of beer per week     Comment: 5 - 6 cans daily    Drug use: Never    Sexual activity: Not Currently     Family History   Problem Relation Age of Onset    Stroke Mother     Heart attack Mother     Prostate cancer Father        Review of  "Systems   All other systems reviewed and are negative.    Objective:   BP (!) 171/78 (BP Location: Left arm, Patient Position: Sitting, BP Method: Large (Automatic))   Pulse 64   Temp 97.6 °F (36.4 °C) (Oral)   Resp 20   Ht 5' 7" (1.702 m)   Wt 82.9 kg (182 lb 12.8 oz)   SpO2 96%   BMI 28.63 kg/m²     Physical Exam  Vitals and nursing note reviewed.   Constitutional:       General: He is awake.      Appearance: Normal appearance. He is well-developed, well-groomed and overweight.   HENT:      Head: Normocephalic and atraumatic.      Right Ear: Tympanic membrane, ear canal and external ear normal.      Left Ear: Tympanic membrane, ear canal and external ear normal.      Nose: Nose normal.      Mouth/Throat:      Mouth: Mucous membranes are dry.      Pharynx: Oropharynx is clear.   Eyes:      Extraocular Movements: Extraocular movements intact.      Conjunctiva/sclera: Conjunctivae normal.      Pupils: Pupils are equal, round, and reactive to light.   Cardiovascular:      Rate and Rhythm: Normal rate and regular rhythm.      Pulses: Normal pulses.      Heart sounds: Normal heart sounds.   Pulmonary:      Effort: Pulmonary effort is normal.      Breath sounds: Normal breath sounds.   Abdominal:      General: Abdomen is flat. Bowel sounds are normal.      Palpations: Abdomen is soft.   Musculoskeletal:         General: Normal range of motion.      Cervical back: Normal range of motion and neck supple.   Skin:     General: Skin is warm and dry.      Capillary Refill: Capillary refill takes less than 2 seconds.   Neurological:      General: No focal deficit present.      Mental Status: He is alert and oriented to person, place, and time. Mental status is at baseline.   Psychiatric:         Mood and Affect: Mood normal.         Behavior: Behavior normal. Behavior is cooperative.         Thought Content: Thought content normal.         Judgment: Judgment normal.       Procedures     Documentation Only on 03/06/2023 "   Component Date Value Ref Range Status    Cholesterol 03/03/2023 159  0 - 200 mg/dL Final    Triglycerides 03/03/2023 118  40 - 160 mg/dL Final    LDL Calculated 03/03/2023 88  0 - 160 MG/DL Final    HDL 03/03/2023 50  35 - 70 mg/dL Final   Hospital Outpatient Visit on 03/02/2023   Component Date Value Ref Range Status    BSA 03/02/2023 1.97  m2 Final   Admission on 01/24/2023, Discharged on 01/25/2023   Component Date Value Ref Range Status    UNIT NUMBER 01/23/2023 [id number]   Final    UNIT ABO/RH 01/23/2023 O POS   Final    DISPENSE STATUS 01/23/2023 Released   Final    Unit Expiration 01/23/2023 202302212359   Final    Product Code 01/23/2023 C5151C99   Final    Unit Blood Type Code 01/23/2023 5100   Final    CROSSMATCH INTERPRETATION 01/23/2023 Compatible   Final    UNIT NUMBER 01/23/2023 [id number]   Final    UNIT ABO/RH 01/23/2023 O POS   Final    DISPENSE STATUS 01/23/2023 Released   Final    Unit Expiration 01/23/2023 [id number]   Final    Product Code 01/23/2023 Q4454T08   Final    Unit Blood Type Code 01/23/2023 5100   Final    CROSSMATCH INTERPRETATION 01/23/2023 Compatible   Final    BSA 01/25/2023 1.97  m2 Final    EF 01/25/2023 60  % Final    Left Ventricular Outflow Tract Cathy* 01/25/2023 0.93  cm/s Final    Left Ventricular Outflow Tract Cathy* 01/25/2023 4.00  mmHg Final    PV PEAK VELOCITY 01/25/2023 1.36  cm/s Final    LVIDd 01/25/2023 5.27  3.5 - 6.0 cm Final    IVS 01/25/2023 1.38  0.6 - 1.1 cm Final    Posterior Wall 01/25/2023 1.21 (A)  0.6 - 1.1 cm Final    LVIDs 01/25/2023 3.75  2.1 - 4.0 cm Final    FS 01/25/2023 29  28 - 44 % Final    LV mass 01/25/2023 282.83  g Final    LA size 01/25/2023 4.20  cm Final    RVDD 01/25/2023 2.72  cm Final    Left Ventricle Relative Wall Thick* 01/25/2023 0.46  cm Final    AV regurgitation pressure 1/2 time 01/25/2023 435  ms Final    AV mean gradient 01/25/2023 10  mmHg Final    AV valve area 01/25/2023 1.86  cm2 Final    AV Velocity Ratio  01/25/2023 0.65   Final    AV index (prosthetic) 01/25/2023 0.66   Final    MV mean gradient 01/25/2023 3  mmHg Final    MV valve area p 1/2 method 01/25/2023 2.12  cm2 Final    MV valve area by continuity eq 01/25/2023 1.51  cm2 Final    E/A ratio 01/25/2023 1.91   Final    E wave deceleration time 01/25/2023 272.00  msec Final    LVOT diameter 01/25/2023 1.90  cm Final    LVOT area 01/25/2023 2.8  cm2 Final    LVOT peak arthur 01/25/2023 1.34  m/s Final    LVOT peak VTI 01/25/2023 29.10  cm Final    Ao peak arthur 01/25/2023 2.06  m/s Final    Ao VTI 01/25/2023 44.4  cm Final    LVOT stroke volume 01/25/2023 82.47  cm3 Final    AV peak gradient 01/25/2023 17  mmHg Final    MV peak gradient 01/25/2023 9  mmHg Final    MV Peak E Arthur 01/25/2023 1.53  m/s Final    AR Max Arthur 01/25/2023 3.79  m/s Final    TR Max Arthur 01/25/2023 1.44  m/s Final    MV VTI 01/25/2023 54.5  cm Final    MV stenosis pressure 1/2 time 01/25/2023 104.00  ms Final    MV Peak A Arthur 01/25/2023 0.80  m/s Final    LV Systolic Volume 01/25/2023 60.02  mL Final    LV Systolic Volume Index 01/25/2023 30.9  mL/m2 Final    LV Diastolic Volume 01/25/2023 133.58  mL Final    LV Diastolic Volume Index 01/25/2023 68.86  mL/m2 Final    LV Mass Index 01/25/2023 146  g/m2 Final    Triscuspid Valve Regurgitation Pea* 01/25/2023 8  mmHg Final    Sodium Level 01/25/2023 135 (L)  136 - 145 mmol/L Final    Potassium Level 01/25/2023 3.9  3.5 - 5.1 mmol/L Final    Chloride 01/25/2023 103  98 - 107 mmol/L Final    Carbon Dioxide 01/25/2023 23  23 - 31 mmol/L Final    Glucose Level 01/25/2023 178 (H)  82 - 115 mg/dL Final    Blood Urea Nitrogen 01/25/2023 18.9  8.4 - 25.7 mg/dL Final    Creatinine 01/25/2023 1.27 (H)  0.73 - 1.18 mg/dL Final    BUN/Creatinine Ratio 01/25/2023 15   Final    Calcium Level Total 01/25/2023 8.5 (L)  8.8 - 10.0 mg/dL Final    Anion Gap 01/25/2023 9.0  mEq/L Final    eGFR 01/25/2023 57  mls/min/1.73/m2 Final    WBC 01/25/2023 7.9  4.5 - 11.5  x10(3)/mcL Final    RBC 01/25/2023 3.67 (L)  4.70 - 6.10 x10(6)/mcL Final    Hgb 01/25/2023 11.4 (L)  14.0 - 18.0 gm/dL Final    Hct 01/25/2023 34.1 (L)  42.0 - 52.0 % Final    MCV 01/25/2023 92.9  80.0 - 94.0 fL Final    MCH 01/25/2023 31.1  pg Final    MCHC 01/25/2023 33.4  33.0 - 36.0 mg/dL Final    RDW 01/25/2023 14.6  11.5 - 17.0 % Final    Platelet 01/25/2023 188  130 - 400 x10(3)/mcL Final    MPV 01/25/2023 10.7 (H)  7.4 - 10.4 fL Final    Neut % 01/25/2023 86.4  % Final    Lymph % 01/25/2023 5.9  % Final    Mono % 01/25/2023 7.0  % Final    Eos % 01/25/2023 0.0  % Final    Basophil % 01/25/2023 0.1  % Final    Lymph # 01/25/2023 0.47 (L)  0.6 - 4.6 x10(3)/mcL Final    Neut # 01/25/2023 6.82  2.1 - 9.2 x10(3)/mcL Final    Mono # 01/25/2023 0.55  0.1 - 1.3 x10(3)/mcL Final    Eos # 01/25/2023 0.00  0 - 0.9 x10(3)/mcL Final    Baso # 01/25/2023 0.01  0 - 0.2 x10(3)/mcL Final    IG# 01/25/2023 0.05 (H)  0 - 0.04 x10(3)/mcL Final    IG% 01/25/2023 0.6  % Final    NRBC% 01/25/2023 0.0  % Final   Hospital Outpatient Visit on 01/24/2023   Component Date Value Ref Range Status    BSA 01/24/2023 1.97  m2 Final   Lab Visit on 01/23/2023   Component Date Value Ref Range Status    Sodium Level 01/23/2023 139  136 - 145 mmol/L Final    Potassium Level 01/23/2023 4.5  3.5 - 5.1 mmol/L Final    Chloride 01/23/2023 102  98 - 107 mmol/L Final    Carbon Dioxide 01/23/2023 28  23 - 31 mmol/L Final    Glucose Level 01/23/2023 99  82 - 115 mg/dL Final    Blood Urea Nitrogen 01/23/2023 18.3  8.4 - 25.7 mg/dL Final    Creatinine 01/23/2023 1.14  0.73 - 1.18 mg/dL Final    Calcium Level Total 01/23/2023 9.5  8.8 - 10.0 mg/dL Final    Protein Total 01/23/2023 6.9  5.8 - 7.6 gm/dL Final    Albumin Level 01/23/2023 3.8  3.4 - 4.8 g/dL Final    Globulin 01/23/2023 3.1  2.4 - 3.5 gm/dL Final    Albumin/Globulin Ratio 01/23/2023 1.2  1.1 - 2.0 ratio Final    Bilirubin Total 01/23/2023 0.7  <=1.5 mg/dL Final    Alkaline Phosphatase  01/23/2023 69  40 - 150 unit/L Final    Alanine Aminotransferase 01/23/2023 19  0 - 55 unit/L Final    Aspartate Aminotransferase 01/23/2023 22  5 - 34 unit/L Final    eGFR 01/23/2023 >60  mls/min/1.73/m2 Final    WBC 01/23/2023 5.9  4.5 - 11.5 x10(3)/mcL Final    RBC 01/23/2023 4.34 (L)  4.70 - 6.10 x10(6)/mcL Final    Hgb 01/23/2023 13.4 (L)  14.0 - 18.0 gm/dL Final    Hct 01/23/2023 40.9 (L)  42.0 - 52.0 % Final    Platelet 01/23/2023 243  130 - 400 x10(3)/mcL Final    MCV 01/23/2023 94.2 (H)  80.0 - 94.0 fL Final    MCH 01/23/2023 30.9  pg Final    MCHC 01/23/2023 32.8 (L)  33.0 - 36.0 mg/dL Final    RDW 01/23/2023 14.5  11.5 - 17.0 % Final    MPV 01/23/2023 9.7  7.4 - 10.4 fL Final    NRBC% 01/23/2023 0.0  % Final    PT 01/23/2023 12.9  12.5 - 14.5 seconds Final    INR 01/23/2023 0.98  0.00 - 1.30 Final    Group & Rh 01/23/2023 O POS   Final    Indirect Lilian GEL 01/23/2023 NEG   Final    ABORH Retype 01/23/2023 O POS   Final   Hospital Outpatient Visit on 10/17/2022   Component Date Value Ref Range Status    BSA 10/17/2022 1.89  m2 Final           Assessment:     1. Primary hypertension    2. Mixed hyperlipidemia    3. Chronic kidney disease, stage 2 (mild)    4. Permanent atrial fibrillation    5. Prostate cancer screening      Plan:   I am having Garrison Garrett maintain his furosemide, doxazosin, atorvastatin, ENTRESTO, aspirin, clopidogreL, and amiodarone.  Problem List Items Addressed This Visit       Hypertension - Primary (Chronic)    Relevant Orders    CBC Auto Differential    Comprehensive Metabolic Panel    Lipid Panel    Urinalysis    Mixed hyperlipidemia (Chronic)    Relevant Orders    Comprehensive Metabolic Panel    Lipid Panel    Permanent atrial fibrillation (Chronic)    Relevant Orders    CBC Auto Differential    Comprehensive Metabolic Panel    Chronic kidney disease, stage 2 (mild) (Chronic)    Relevant Orders    Comprehensive Metabolic Panel     Other Visit Diagnoses       Prostate  cancer screening        Relevant Orders    PSA, Screening          Follow up in about 6 months (around 10/6/2023) for AWV in 6 months.    Garrison was seen today for hypertension and hyperlipidemia.    Diagnoses and all orders for this visit:    Primary hypertension  -     CBC Auto Differential; Future  -     Comprehensive Metabolic Panel; Future  -     Lipid Panel; Future  -     Urinalysis; Future  -     Urinalysis  - Recheck BP prior to DC  - Note medication changes  - Referral to digital medicine and/or instructions for home BP management and measurement given  - DASH diet and HTN Lifestyle change handouts printed and provided      Mixed hyperlipidemia  -     Comprehensive Metabolic Panel; Future  -     Lipid Panel; Future   Continue current prescription medications. Refills as needed   Condition/Symptoms controlled/stable   Surveillance labs ordered as needed, or reviewed in visit.   RTC 6 months (as scheduled) or PRN      Chronic kidney disease, stage 2 (mild)  -     Comprehensive Metabolic Panel; Future   Continue current prescription medications. Refills as needed   Condition/Symptoms controlled/stable   Surveillance labs ordered as needed, or reviewed in visit.   RTC 6 months (as scheduled) or PRN      Permanent atrial fibrillation  -     CBC Auto Differential; Future  -     Comprehensive Metabolic Panel; Future   Continue current prescription medications. Refills as needed   Condition/Symptoms controlled/stable - sees cardiologist for this regularly   Surveillance labs ordered as needed, or reviewed in visit.   RTC 6 months (as scheduled) or PRN      Prostate cancer screening  -     PSA, Screening; Future         [unfilled]  Orders Placed This Encounter   Procedures    CBC Auto Differential     Standing Status:   Future     Standing Expiration Date:   4/6/2024    Comprehensive Metabolic Panel     Standing Status:   Future     Standing Expiration Date:   4/6/2024    Lipid Panel     Standing Status:   Future      Standing Expiration Date:   4/6/2024    Urinalysis     Standing Status:   Future     Number of Occurrences:   1     Standing Expiration Date:   4/6/2024    PSA, Screening     Standing Status:   Future     Standing Expiration Date:   4/6/2024       Medication List with Changes/Refills   Current Medications    AMIODARONE (PACERONE) 200 MG TAB    Take 1 tablet (200 mg total) by mouth once daily.    ASPIRIN (ECOTRIN) 81 MG EC TABLET    Take 1 tablet (81 mg total) by mouth once daily.    ATORVASTATIN (LIPITOR) 40 MG TABLET    Take 40 mg by mouth once daily.    CLOPIDOGREL (PLAVIX) 75 MG TABLET    Take 1 tablet (75 mg total) by mouth once daily.    DOXAZOSIN (CARDURA) 2 MG TABLET    Take 2 mg by mouth.    FUROSEMIDE (LASIX) 40 MG TABLET    Take 40 mg by mouth every Mon, Wed, Fri.    SACUBITRIL-VALSARTAN (ENTRESTO) 24-26 MG PER TABLET    Take 1 tablet by mouth once daily.      Medication List with Changes/Refills   Current Medications    AMIODARONE (PACERONE) 200 MG TAB    Take 1 tablet (200 mg total) by mouth once daily.       Start Date: 1/25/2023 End Date: --    ASPIRIN (ECOTRIN) 81 MG EC TABLET    Take 1 tablet (81 mg total) by mouth once daily.       Start Date: 1/25/2023 End Date: 1/25/2024    ATORVASTATIN (LIPITOR) 40 MG TABLET    Take 40 mg by mouth once daily.       Start Date: 9/24/2022 End Date: --    CLOPIDOGREL (PLAVIX) 75 MG TABLET    Take 1 tablet (75 mg total) by mouth once daily.       Start Date: 1/25/2023 End Date: 1/25/2024    DOXAZOSIN (CARDURA) 2 MG TABLET    Take 2 mg by mouth.       Start Date: 3/17/2022 End Date: --    FUROSEMIDE (LASIX) 40 MG TABLET    Take 40 mg by mouth every Mon, Wed, Fri.       Start Date: 4/22/2022 End Date: --    SACUBITRIL-VALSARTAN (ENTRESTO) 24-26 MG PER TABLET    Take 1 tablet by mouth once daily.       Start Date: --        End Date: --

## 2023-07-26 DIAGNOSIS — R06.09 DYSPNEA ON EXERTION: Primary | ICD-10-CM

## 2023-08-29 ENCOUNTER — PROCEDURE VISIT (OUTPATIENT)
Dept: RESPIRATORY THERAPY | Facility: HOSPITAL | Age: 83
End: 2023-08-29
Attending: INTERNAL MEDICINE
Payer: MEDICARE

## 2023-08-29 DIAGNOSIS — R06.09 DYSPNEA ON EXERTION: ICD-10-CM

## 2023-08-29 PROCEDURE — 94727 GAS DIL/WSHOT DETER LNG VOL: CPT

## 2023-08-29 PROCEDURE — 94010 BREATHING CAPACITY TEST: CPT

## 2023-08-29 PROCEDURE — 94729 DIFFUSING CAPACITY: CPT

## 2023-08-29 NOTE — PROGRESS NOTES
PT COULD NOT EXHALE FOR 6 SECONDS, MULTIPLE FVC'S ATTEMPTED    GREAT PT EFFORT AND COOPERATION    PFT COMPLETE

## 2023-09-13 LAB
CHOLEST SERPL-MSCNC: 178 MG/DL (ref 0–200)
CREATININE, URINE: 50.1 MG/DL
HDLC SERPL-MCNC: 74 MG/DL (ref 35–70)
LDLC SERPL CALC-MCNC: 90 MG/DL (ref 0–160)
TRIGL SERPL-MCNC: 55 MG/DL (ref 40–160)

## 2023-09-14 ENCOUNTER — DOCUMENTATION ONLY (OUTPATIENT)
Dept: FAMILY MEDICINE | Facility: CLINIC | Age: 83
End: 2023-09-14
Payer: MEDICARE

## 2023-10-06 ENCOUNTER — OFFICE VISIT (OUTPATIENT)
Dept: FAMILY MEDICINE | Facility: CLINIC | Age: 83
End: 2023-10-06
Payer: MEDICARE

## 2023-10-06 VITALS
OXYGEN SATURATION: 100 % | HEIGHT: 67 IN | WEIGHT: 189.69 LBS | SYSTOLIC BLOOD PRESSURE: 134 MMHG | RESPIRATION RATE: 18 BRPM | HEART RATE: 67 BPM | BODY MASS INDEX: 29.77 KG/M2 | DIASTOLIC BLOOD PRESSURE: 71 MMHG | TEMPERATURE: 98 F

## 2023-10-06 DIAGNOSIS — I48.21 PERMANENT ATRIAL FIBRILLATION: Primary | Chronic | ICD-10-CM

## 2023-10-06 DIAGNOSIS — N18.2 CHRONIC KIDNEY DISEASE, STAGE 2 (MILD): Chronic | ICD-10-CM

## 2023-10-06 DIAGNOSIS — E78.2 MIXED HYPERLIPIDEMIA: Chronic | ICD-10-CM

## 2023-10-06 DIAGNOSIS — I10 PRIMARY HYPERTENSION: Chronic | ICD-10-CM

## 2023-10-06 PROBLEM — R26.81 UNSTEADINESS ON FEET: Status: RESOLVED | Noted: 2022-05-09 | Resolved: 2023-10-06

## 2023-10-06 PROBLEM — Z23 NEED FOR IMMUNIZATION AGAINST INFLUENZA: Status: RESOLVED | Noted: 2022-10-04 | Resolved: 2023-10-06

## 2023-10-06 PROBLEM — S12.9XXA CLOSED FRACTURE OF MULTIPLE CERVICAL VERTEBRAE: Status: RESOLVED | Noted: 2022-03-17 | Resolved: 2023-10-06

## 2023-10-06 PROBLEM — M19.019 ARTHRITIS OF SHOULDER: Status: RESOLVED | Noted: 2022-05-09 | Resolved: 2023-10-06

## 2023-10-06 PROBLEM — G82.20 PARAPARESIS: Status: RESOLVED | Noted: 2022-10-04 | Resolved: 2023-10-06

## 2023-10-06 PROBLEM — J32.9 RECURRENT SINUSITIS: Status: RESOLVED | Noted: 2022-10-04 | Resolved: 2023-10-06

## 2023-10-06 PROBLEM — M70.20 OLECRANON BURSITIS: Status: RESOLVED | Noted: 2022-10-04 | Resolved: 2023-10-06

## 2023-10-06 PROBLEM — Z23 NEED FOR VACCINATION: Status: RESOLVED | Noted: 2022-10-04 | Resolved: 2023-10-06

## 2023-10-06 PROBLEM — H91.93 BILATERAL HEARING LOSS: Status: RESOLVED | Noted: 2022-05-09 | Resolved: 2023-10-06

## 2023-10-06 PROBLEM — E66.3 OVERWEIGHT: Chronic | Status: RESOLVED | Noted: 2022-10-04 | Resolved: 2023-10-06

## 2023-10-06 PROCEDURE — 1126F PR PAIN SEVERITY QUANTIFIED, NO PAIN PRESENT: ICD-10-PCS | Mod: CPTII,,, | Performed by: STUDENT IN AN ORGANIZED HEALTH CARE EDUCATION/TRAINING PROGRAM

## 2023-10-06 PROCEDURE — 1159F PR MEDICATION LIST DOCUMENTED IN MEDICAL RECORD: ICD-10-PCS | Mod: CPTII,,, | Performed by: STUDENT IN AN ORGANIZED HEALTH CARE EDUCATION/TRAINING PROGRAM

## 2023-10-06 PROCEDURE — 3078F DIAST BP <80 MM HG: CPT | Mod: CPTII,,, | Performed by: STUDENT IN AN ORGANIZED HEALTH CARE EDUCATION/TRAINING PROGRAM

## 2023-10-06 PROCEDURE — 1159F MED LIST DOCD IN RCRD: CPT | Mod: CPTII,,, | Performed by: STUDENT IN AN ORGANIZED HEALTH CARE EDUCATION/TRAINING PROGRAM

## 2023-10-06 PROCEDURE — 3078F PR MOST RECENT DIASTOLIC BLOOD PRESSURE < 80 MM HG: ICD-10-PCS | Mod: CPTII,,, | Performed by: STUDENT IN AN ORGANIZED HEALTH CARE EDUCATION/TRAINING PROGRAM

## 2023-10-06 PROCEDURE — 3288F FALL RISK ASSESSMENT DOCD: CPT | Mod: CPTII,,, | Performed by: STUDENT IN AN ORGANIZED HEALTH CARE EDUCATION/TRAINING PROGRAM

## 2023-10-06 PROCEDURE — 1101F PT FALLS ASSESS-DOCD LE1/YR: CPT | Mod: CPTII,,, | Performed by: STUDENT IN AN ORGANIZED HEALTH CARE EDUCATION/TRAINING PROGRAM

## 2023-10-06 PROCEDURE — 3075F SYST BP GE 130 - 139MM HG: CPT | Mod: CPTII,,, | Performed by: STUDENT IN AN ORGANIZED HEALTH CARE EDUCATION/TRAINING PROGRAM

## 2023-10-06 PROCEDURE — 99214 PR OFFICE/OUTPT VISIT, EST, LEVL IV, 30-39 MIN: ICD-10-PCS | Mod: ,,, | Performed by: STUDENT IN AN ORGANIZED HEALTH CARE EDUCATION/TRAINING PROGRAM

## 2023-10-06 PROCEDURE — 1126F AMNT PAIN NOTED NONE PRSNT: CPT | Mod: CPTII,,, | Performed by: STUDENT IN AN ORGANIZED HEALTH CARE EDUCATION/TRAINING PROGRAM

## 2023-10-06 PROCEDURE — 3075F PR MOST RECENT SYSTOLIC BLOOD PRESS GE 130-139MM HG: ICD-10-PCS | Mod: CPTII,,, | Performed by: STUDENT IN AN ORGANIZED HEALTH CARE EDUCATION/TRAINING PROGRAM

## 2023-10-06 PROCEDURE — 1101F PR PT FALLS ASSESS DOC 0-1 FALLS W/OUT INJ PAST YR: ICD-10-PCS | Mod: CPTII,,, | Performed by: STUDENT IN AN ORGANIZED HEALTH CARE EDUCATION/TRAINING PROGRAM

## 2023-10-06 PROCEDURE — 3288F PR FALLS RISK ASSESSMENT DOCUMENTED: ICD-10-PCS | Mod: CPTII,,, | Performed by: STUDENT IN AN ORGANIZED HEALTH CARE EDUCATION/TRAINING PROGRAM

## 2023-10-06 PROCEDURE — 99214 OFFICE O/P EST MOD 30 MIN: CPT | Mod: ,,, | Performed by: STUDENT IN AN ORGANIZED HEALTH CARE EDUCATION/TRAINING PROGRAM

## 2023-10-06 NOTE — ASSESSMENT & PLAN NOTE
- BP well-controlled.  - Patient following with Dr. Ollie Marroquin with Cardiology.  - Patient taking Aspirin, Amiodarone, Entresto, Lasix, and Lipitor. Medication regimen per Cardiology.

## 2023-10-06 NOTE — PROGRESS NOTES
"Subjective:      Patient ID: Garrison Garrett is a 82 y.o.  male.    Chief Complaint: Establish Care    A-Fib/HTN/HLD: Patient following with Dr. Ollie Marroquin with Cardiology. Patient taking Aspirin, Amiodarone, Entresto, Lasix, and Lipitor.    CKDII: Patient following with Dr. Nola Lincoln with Nephrology.    Review of Systems   Constitutional:  Negative for activity change, appetite change, chills, diaphoresis, fatigue, fever and unexpected weight change.   Eyes:  Negative for visual disturbance.   Respiratory:  Negative for apnea, cough, shortness of breath and wheezing.    Cardiovascular:  Negative for chest pain, palpitations and leg swelling.   Gastrointestinal:  Negative for abdominal pain, blood in stool, constipation, diarrhea, nausea and vomiting.   Genitourinary:  Negative for dysuria and hematuria.   Skin:  Negative for rash and wound.   Neurological:  Negative for dizziness, syncope, weakness, numbness and headaches.   Psychiatric/Behavioral:  Negative for behavioral problems, dysphoric mood and sleep disturbance. The patient is not nervous/anxious.      Objective:   /71 (BP Location: Right arm, Patient Position: Sitting, BP Method: Medium (Automatic))   Pulse 67   Temp 97.6 °F (36.4 °C) (Temporal)   Resp 18   Ht 5' 7" (1.702 m)   Wt 86 kg (189 lb 11.2 oz)   SpO2 100%   BMI 29.71 kg/m²     Physical Exam  Vitals and nursing note reviewed.   Constitutional:       General: He is not in acute distress.     Appearance: Normal appearance. He is not ill-appearing or toxic-appearing.   HENT:      Head: Normocephalic and atraumatic.   Eyes:      Conjunctiva/sclera: Conjunctivae normal.   Cardiovascular:      Rate and Rhythm: Normal rate and regular rhythm.      Heart sounds: Murmur heard.   Pulmonary:      Effort: Pulmonary effort is normal. No respiratory distress.      Breath sounds: Normal breath sounds. No wheezing.   Abdominal:      General: Bowel sounds are normal. There is no " distension.      Palpations: Abdomen is soft.      Tenderness: There is no abdominal tenderness.   Musculoskeletal:         General: No deformity.      Right lower leg: No edema.      Left lower leg: No edema.   Skin:     General: Skin is warm and dry.      Findings: No lesion or rash.   Neurological:      General: No focal deficit present.      Mental Status: He is alert.      Motor: No weakness.      Coordination: Coordination normal.   Psychiatric:         Mood and Affect: Mood normal.         Behavior: Behavior normal.         Thought Content: Thought content normal.         Judgment: Judgment normal.       Assessment/Plan:   1. Permanent atrial fibrillation  Assessment & Plan:  - Stable.  - Patient following with Dr. Ollie Marroquin with Cardiology.  - Patient taking Aspirin, Amiodarone, Entresto, Lasix, and Lipitor. Medication regimen per Cardiology.      2. Primary hypertension  Assessment & Plan:  - BP well-controlled.  - Patient following with Dr. Ollie Marroquin with Cardiology.  - Patient taking Aspirin, Amiodarone, Entresto, Lasix, and Lipitor. Medication regimen per Cardiology.      3. Mixed hyperlipidemia  Assessment & Plan:  - Patient following with Dr. Ollie Marroquin with Cardiology.  - Continue Lipitor 40mg daily.      4. Chronic kidney disease, stage 2 (mild)  Assessment & Plan:  - Stable.  - Patient following with Dr. Nola Lincoln with Nephrology.   - Nephrology office visit from 09/21/23 reviewed.         Follow up in about 3 months (around 1/6/2024) for Medicare Wellness.

## 2023-10-06 NOTE — ASSESSMENT & PLAN NOTE
- Stable.  - Patient following with Dr. Ollie Marroquin with Cardiology.  - Patient taking Aspirin, Amiodarone, Entresto, Lasix, and Lipitor. Medication regimen per Cardiology.

## 2023-10-06 NOTE — ASSESSMENT & PLAN NOTE
- Stable.  - Patient following with Dr. Nola Lincoln with Nephrology.   - Nephrology office visit from 09/21/23 reviewed.

## 2023-10-19 NOTE — Clinical Note
The transseptal needle was inserted. Graft Donor Site Bandage (Optional-Leave Blank If You Don't Want In Note): Steri-strips and a pressure bandage were applied to the donor site.

## 2023-12-11 ENCOUNTER — TELEPHONE (OUTPATIENT)
Dept: FAMILY MEDICINE | Facility: CLINIC | Age: 83
End: 2023-12-11
Payer: MEDICARE

## 2023-12-11 NOTE — TELEPHONE ENCOUNTER
----- Message from Kaitlin Hernandez sent at 12/11/2023  8:46 AM CST -----  Regarding: med advice  .Type:  Needs Medical Advice    Who Called: Cindy Cagle [facility]   Symptoms (please be specific):    How long has patient had these symptoms:    Pharmacy name and phone #:    Would the patient rather a call back or a response via [facility]?Call back  Best Call Back Number: [phone]  Additional Information: needs to speak with nurse in regards to pt being [place] hosp [place] but trying to get into local facility.

## 2023-12-11 NOTE — TELEPHONE ENCOUNTER
Josy from the [facility] in [place], [place] called requesting we provide the office e-mail to [facility]. I advised her I called the facility and she didn't answer. She asked that I call her (Tsering at [facility]) to let her know. I called Tsering and no answer. Omari

## 2023-12-11 NOTE — TELEPHONE ENCOUNTER
I spoke with Rachna and explained that we don't use e-mail to communicate at our office. I did give her my e-mail and advised that this isn't preferred. She stated the state will only use the e-mail if they have questions. Omari

## 2023-12-11 NOTE — TELEPHONE ENCOUNTER
----- Message from Denisse Garcia sent at 12/11/2023 12:01 PM CST -----  Type:  Needs Medical Advice    Who Called:  Tsering [facility]    Symptoms (please be specific): unk     How long has patient had these symptoms:  unk    Pharmacy name and phone #:  unk    Would the patient rather a call back ? Yes     Best Call Back Number: [phone]    Additional Information: Tsering states she needs an email from the clinic in case the state have any questions in regards to the pt otherwise his case will be dismissed, please advise, thanks

## 2023-12-28 DIAGNOSIS — S72.002D CLOSED LEFT HIP FRACTURE, WITH ROUTINE HEALING, SUBSEQUENT ENCOUNTER: Primary | ICD-10-CM

## 2024-01-07 ENCOUNTER — LAB REQUISITION (OUTPATIENT)
Dept: LAB | Facility: HOSPITAL | Age: 84
End: 2024-01-07
Attending: INTERNAL MEDICINE
Payer: MEDICARE

## 2024-01-07 DIAGNOSIS — R94.31 ABNORMAL ELECTROCARDIOGRAM (ECG) (EKG): ICD-10-CM

## 2024-01-07 DIAGNOSIS — R53.1 WEAKNESS: ICD-10-CM

## 2024-01-07 LAB
ANION GAP SERPL CALC-SCNC: 9 MEQ/L
BUN SERPL-MCNC: 15 MG/DL (ref 8.4–25.7)
CALCIUM SERPL-MCNC: 8.4 MG/DL (ref 8.8–10)
CHLORIDE SERPL-SCNC: 98 MMOL/L (ref 98–107)
CO2 SERPL-SCNC: 21 MMOL/L (ref 23–31)
CREAT SERPL-MCNC: 1.17 MG/DL (ref 0.73–1.18)
CREAT/UREA NIT SERPL: 13
ERYTHROCYTE [DISTWIDTH] IN BLOOD BY AUTOMATED COUNT: 14.8 % (ref 11.5–17)
GFR SERPLBLD CREATININE-BSD FMLA CKD-EPI: >60 MLS/MIN/1.73/M2
GLUCOSE SERPL-MCNC: 100 MG/DL (ref 82–115)
HCT VFR BLD AUTO: 30.9 % (ref 42–52)
HGB BLD-MCNC: 10.1 G/DL (ref 14–18)
MCH RBC QN AUTO: 29.8 PG (ref 27–31)
MCHC RBC AUTO-ENTMCNC: 32.7 G/DL (ref 33–36)
MCV RBC AUTO: 91.2 FL (ref 80–94)
PLATELET # BLD AUTO: 331 X10(3)/MCL (ref 130–400)
PMV BLD AUTO: 10.8 FL (ref 7.4–10.4)
POTASSIUM SERPL-SCNC: 5.2 MMOL/L (ref 3.5–5.1)
RBC # BLD AUTO: 3.39 X10(6)/MCL (ref 4.7–6.1)
SODIUM SERPL-SCNC: 128 MMOL/L (ref 136–145)
WBC # SPEC AUTO: 7.27 X10(3)/MCL (ref 4.5–11.5)

## 2024-01-07 PROCEDURE — 85027 COMPLETE CBC AUTOMATED: CPT | Performed by: INTERNAL MEDICINE

## 2024-01-07 PROCEDURE — 80048 BASIC METABOLIC PNL TOTAL CA: CPT | Performed by: INTERNAL MEDICINE

## 2024-01-09 ENCOUNTER — HOSPITAL ENCOUNTER (OUTPATIENT)
Dept: RADIOLOGY | Facility: CLINIC | Age: 84
Discharge: HOME | End: 2024-01-09
Attending: ORTHOPAEDIC SURGERY
Payer: MEDICARE

## 2024-01-09 ENCOUNTER — OFFICE VISIT (OUTPATIENT)
Dept: ORTHOPEDICS | Facility: CLINIC | Age: 84
End: 2024-01-09
Payer: MEDICARE

## 2024-01-09 VITALS
HEART RATE: 96 BPM | BODY MASS INDEX: 29.71 KG/M2 | DIASTOLIC BLOOD PRESSURE: 63 MMHG | HEIGHT: 67 IN | SYSTOLIC BLOOD PRESSURE: 104 MMHG

## 2024-01-09 DIAGNOSIS — S72.002D CLOSED LEFT HIP FRACTURE, WITH ROUTINE HEALING, SUBSEQUENT ENCOUNTER: ICD-10-CM

## 2024-01-09 DIAGNOSIS — S72.112A DISPLACED FRACTURE OF GREATER TROCHANTER OF LEFT FEMUR, INITIAL ENCOUNTER FOR CLOSED FRACTURE: Primary | ICD-10-CM

## 2024-01-09 PROCEDURE — 99204 OFFICE O/P NEW MOD 45 MIN: CPT | Mod: ,,, | Performed by: ORTHOPAEDIC SURGERY

## 2024-01-09 PROCEDURE — 73502 X-RAY EXAM HIP UNI 2-3 VIEWS: CPT | Mod: LT,,, | Performed by: ORTHOPAEDIC SURGERY

## 2024-01-09 RX ORDER — VALSARTAN 40 MG/1
40 TABLET ORAL 2 TIMES DAILY
COMMUNITY
Start: 2023-12-10

## 2024-01-09 RX ORDER — LISINOPRIL 20 MG/1
20 TABLET ORAL
COMMUNITY
Start: 2023-11-28

## 2024-01-09 RX ORDER — NAPROXEN 500 MG/1
500 TABLET ORAL 2 TIMES DAILY
COMMUNITY
Start: 2023-10-24 | End: 2024-02-06

## 2024-01-09 RX ORDER — METHOCARBAMOL 750 MG/1
750 TABLET, FILM COATED ORAL 3 TIMES DAILY
COMMUNITY
Start: 2023-10-24 | End: 2024-02-06

## 2024-01-09 NOTE — PROGRESS NOTES
Chief Complaint:   Chief Complaint   Patient presents with    Hip Pain     Left hip pain, fell about 6-8 weeks ago had been in a different state was in the hospital for 10 days came back hasn't been able to be self ambulatory has just been in a wheelchair and laying in bed previous imaging done XR in chart, no prior injury or sx before hand takes OTC medication to help him        History of present illness:  83-year-old male presents today for evaluation of left hip pain.  Patient with a history of a fall about 6 weeks ago.  He was seen at outside facility.  He has been admitted to the hospitalist since that time.  Eventually was transferred to a closure skilled nursing facility.  Patient was diagnosed with a left hip fracture.  This was deemed suitable for nonoperative treatment.  Currently complaining of no significant pain.  He has been nonweightbearing since his return here as he has not seen an orthopedic surgeon.    Past Medical History:   Diagnosis Date    Anticoagulant long-term use     Arthritis     CAD (coronary artery disease)     CHF (congestive heart failure)     HLD (hyperlipidemia)     [place] (hard of hearing)     HTN (hypertension)     Paroxysmal atrial fibrillation        Past Surgical History:   Procedure Laterality Date    CARDIOVERSION  04/12/2021    [facility]    CATARACT EXTRACTION Bilateral     CLOSURE OF LEFT ATRIAL APPENDAGE USING DEVICE N/A 01/24/2023    Procedure: CLOSURE, LEFT ATRIAL APPENDAGE, USING DEVICE;  Surgeon: Joce Layne MD;  Location: [facility] CATH LAB;  Service: Cardiology;  Laterality: N/A;  AMULET W/ INTRA OP [facility]    ECHOCARDIOGRAM,TRANSESOPHAGEAL N/A 01/24/2023    Procedure: ECHOCARDIOGRAM,TRANSESOPHAGEAL;  Surgeon: Chu Diagnostic Provider;  Location: [facility] CATH LAB;  Service: Cardiology;  Laterality: N/A;    EYE SURGERY  2000    [facility]    MULTIPLE TOOTH EXTRACTIONS      SINUS SURGERY         Current Outpatient Medications   Medication Sig    amiodarone (PACERONE) 200 MG Tab Take 1  tablet (200 mg total) by mouth once daily.    aspirin (ECOTRIN) 81 MG EC tablet Take 1 tablet (81 mg total) by mouth once daily.    atorvastatin (LIPITOR) 40 MG tablet Take 40 mg by mouth once daily.    lisinopriL (PRINIVIL,ZESTRIL) 20 MG tablet Take 20 mg by mouth.    methocarbamoL (ROBAXIN) 750 MG Tab Take 750 mg by mouth 3 (three) times daily.    naproxen (NAPROSYN) 500 MG tablet Take 500 mg by mouth 2 (two) times daily.    sacubitriL-valsartan (ENTRESTO) 24-26 mg per tablet Take 2 tablets by mouth once daily. Patient takes one in the morning and one at night    valsartan (DIOVAN) 40 MG tablet Take 40 mg by mouth 2 (two) times daily.    furosemide (LASIX) 40 MG tablet Take 40 mg by mouth every Mon, Wed, Fri.     No current facility-administered medications for this visit.       Review of patient's allergies indicates:   Allergen Reactions    Entresto [sacubitril-valsartan]     Hydrochlorothiazide      Other reaction(s): precaution       Family History   Problem Relation Age of Onset    Stroke Mother     Heart attack Mother     Hearing loss Mother     Prostate cancer Father     Kidney disease Son     Vision loss Sister        Social History     Socioeconomic History    Marital status:    Tobacco Use    Smoking status: Former     Types: Cigarettes     Passive exposure: Never    Smokeless tobacco: Never    Tobacco comments:     Quit 30 - 35 years ago   Substance and Sexual Activity    Alcohol use: Yes     Alcohol/week: 6.0 standard drinks of alcohol     Types: 6 Cans of beer per week     Comment: 5 or 6 dayla    Drug use: Never    Sexual activity: Not Currently     Birth control/protection: Implant           Review of Systems:    Constitution: Negative for chills, fever, and sweats.  Negative for unexplained weight loss.    HENT:  Negative for headaches and blurry vision.    Cardiovascular:Negative for chest pain or irregular heart beat. Negative for hypertension.    Respiratory:  Negative for cough and  shortness of breath.    Gastrointestinal: Negative for abdominal pain, heartburn, melena, nausea, and vomitting.    Genitourinary:  Negative bladder incontinence and dysuria.    Musculoskeletal:  See HPI    Neurological: Negative for numbness.    Psychiatric/Behavioral: Negative for depression.  The patient is not nervous/anxious.      Endocrine: Negative for polyuria    Hematologic/Lymphatic: Negative for bleeding problem.  Does not bruise/bleed easily.    Skin: Negative for poor would healing and rash      Physical Examination:    Vital Signs:    Vitals:    01/09/24 1402   BP: 104/63   Pulse: 96       Body mass index is 29.71 kg/m².    General: No acute distress, alert and oriented, healthy appearing    HEENT: Head is atraumatic, mucous membranes are moist    Neck: Supples, no JVD    Cardiovascular: Palpable dorsalis pedis and posterior tibial pulses, regular rate and rhythm to those pulses    Lungs: Breathing non-labored    Skin: no rashes appreciated    Neurologic: Can flex and extend knees, ankles, and toes. Sensation is grossly intact    Range of motion left hip without significant pain or discomfort.  Brisk cap refill disappeared sensation intact distally.    X-rays:  Three views left hip reviewed.  Patient with minimally displaced greater trochanter fracture.  No medial or calcar extension seen.     Assessment::  Left greater trochanter fracture    Plan:  Discussed treatment with the patient.  We will attempt nonoperative treatment of this.  Did not appear to extend medially however we would like to get a CT scan of the to confirm no medial calcar extension.  Patient can be weightbear to tolerance to the left hip.  Range of motion as tolerated.  We will see him back after a CT scan to go over the results.    This note was created using [employer] voice recognition software that occasionally misinterpreted phrases or words.    Consult note is delivered via Epic messaging service.

## 2024-01-10 DIAGNOSIS — S72.112A: Primary | ICD-10-CM

## 2024-01-14 ENCOUNTER — HOSPITAL ENCOUNTER (EMERGENCY)
Facility: HOSPITAL | Age: 84
Discharge: NURSING, REHAB OR LONG-TERM CARE | End: 2024-01-14
Attending: EMERGENCY MEDICINE
Payer: MEDICARE

## 2024-01-14 VITALS
HEIGHT: 67 IN | TEMPERATURE: 98 F | DIASTOLIC BLOOD PRESSURE: 75 MMHG | SYSTOLIC BLOOD PRESSURE: 121 MMHG | HEART RATE: 88 BPM | BODY MASS INDEX: 26.21 KG/M2 | OXYGEN SATURATION: 98 % | RESPIRATION RATE: 16 BRPM | WEIGHT: 167 LBS

## 2024-01-14 DIAGNOSIS — D64.9 ANEMIA, UNSPECIFIED TYPE: ICD-10-CM

## 2024-01-14 DIAGNOSIS — E86.0 DEHYDRATION: ICD-10-CM

## 2024-01-14 DIAGNOSIS — I48.0 PAROXYSMAL ATRIAL FIBRILLATION: ICD-10-CM

## 2024-01-14 DIAGNOSIS — I95.9 HYPOTENSION, UNSPECIFIED HYPOTENSION TYPE: Primary | ICD-10-CM

## 2024-01-14 DIAGNOSIS — I95.9 HYPOTENSION: ICD-10-CM

## 2024-01-14 DIAGNOSIS — N30.00 ACUTE CYSTITIS WITHOUT HEMATURIA: ICD-10-CM

## 2024-01-14 LAB
ALBUMIN SERPL-MCNC: 2.8 G/DL (ref 3.4–4.8)
ALBUMIN/GLOB SERPL: 0.7 RATIO (ref 1.1–2)
ALP SERPL-CCNC: 64 UNIT/L (ref 40–150)
ALT SERPL-CCNC: 19 UNIT/L (ref 0–55)
APPEARANCE UR: ABNORMAL
APTT PPP: 29.4 SECONDS (ref 24.6–37.2)
AST SERPL-CCNC: 22 UNIT/L (ref 5–34)
BACTERIA #/AREA URNS AUTO: ABNORMAL /HPF
BASOPHILS # BLD AUTO: 0.07 X10(3)/MCL
BASOPHILS NFR BLD AUTO: 0.8 %
BILIRUB SERPL-MCNC: 0.6 MG/DL
BILIRUB UR QL STRIP.AUTO: NEGATIVE
BUN SERPL-MCNC: 13 MG/DL (ref 8.4–25.7)
CALCIUM SERPL-MCNC: 8.7 MG/DL (ref 8.8–10)
CHLORIDE SERPL-SCNC: 99 MMOL/L (ref 98–107)
CO2 SERPL-SCNC: 19 MMOL/L (ref 23–31)
COLOR UR AUTO: YELLOW
CREAT SERPL-MCNC: 1.58 MG/DL (ref 0.73–1.18)
EOSINOPHIL # BLD AUTO: 0.13 X10(3)/MCL (ref 0–0.9)
EOSINOPHIL NFR BLD AUTO: 1.4 %
ERYTHROCYTE [DISTWIDTH] IN BLOOD BY AUTOMATED COUNT: 15.1 % (ref 11.5–17)
FLUAV AG UPPER RESP QL IA.RAPID: NOT DETECTED
FLUBV AG UPPER RESP QL IA.RAPID: NOT DETECTED
GFR SERPLBLD CREATININE-BSD FMLA CKD-EPI: 44 MLS/MIN/1.73/M2
GLOBULIN SER-MCNC: 4.2 GM/DL (ref 2.4–3.5)
GLUCOSE SERPL-MCNC: 113 MG/DL (ref 82–115)
GLUCOSE UR QL STRIP.AUTO: NEGATIVE
HCT VFR BLD AUTO: 30.3 % (ref 42–52)
HGB BLD-MCNC: 9.9 G/DL (ref 14–18)
IMM GRANULOCYTES # BLD AUTO: 0.18 X10(3)/MCL (ref 0–0.04)
IMM GRANULOCYTES NFR BLD AUTO: 2 %
INR PPP: 1.1
KETONES UR QL STRIP.AUTO: NEGATIVE
LEUKOCYTE ESTERASE UR QL STRIP.AUTO: ABNORMAL
LYMPHOCYTES # BLD AUTO: 1.15 X10(3)/MCL (ref 0.6–4.6)
LYMPHOCYTES NFR BLD AUTO: 12.5 %
MAGNESIUM SERPL-MCNC: 1.6 MG/DL (ref 1.6–2.6)
MCH RBC QN AUTO: 29.6 PG (ref 27–31)
MCHC RBC AUTO-ENTMCNC: 32.7 G/DL (ref 33–36)
MCV RBC AUTO: 90.4 FL (ref 80–94)
MONOCYTES # BLD AUTO: 0.89 X10(3)/MCL (ref 0.1–1.3)
MONOCYTES NFR BLD AUTO: 9.6 %
NEUTROPHILS # BLD AUTO: 6.81 X10(3)/MCL (ref 2.1–9.2)
NEUTROPHILS NFR BLD AUTO: 73.7 %
NITRITE UR QL STRIP.AUTO: NEGATIVE
PH UR STRIP.AUTO: 5.5 [PH]
PLATELET # BLD AUTO: 309 X10(3)/MCL (ref 130–400)
PMV BLD AUTO: 9 FL (ref 7.4–10.4)
POTASSIUM SERPL-SCNC: 3.8 MMOL/L (ref 3.5–5.1)
PROT SERPL-MCNC: 7 GM/DL (ref 5.8–7.6)
PROT UR QL STRIP.AUTO: ABNORMAL
PROTHROMBIN TIME: 14.2 SECONDS (ref 12.5–14.5)
RBC # BLD AUTO: 3.35 X10(6)/MCL (ref 4.7–6.1)
RBC #/AREA URNS AUTO: ABNORMAL /HPF
RBC UR QL AUTO: ABNORMAL
SARS-COV-2 RNA RESP QL NAA+PROBE: NOT DETECTED
SODIUM SERPL-SCNC: 129 MMOL/L (ref 136–145)
SP GR UR STRIP.AUTO: 1.02 (ref 1–1.03)
SQUAMOUS #/AREA URNS AUTO: ABNORMAL /HPF
TROPONIN I SERPL-MCNC: <0.01 NG/ML (ref 0–0.04)
TSH SERPL-ACNC: 4.45 UIU/ML (ref 0.35–4.94)
UROBILINOGEN UR STRIP-ACNC: 0.2
WBC # SPEC AUTO: 9.23 X10(3)/MCL (ref 4.5–11.5)
WBC #/AREA URNS AUTO: >=100 /HPF

## 2024-01-14 PROCEDURE — 84484 ASSAY OF TROPONIN QUANT: CPT | Performed by: NURSE PRACTITIONER

## 2024-01-14 PROCEDURE — 85610 PROTHROMBIN TIME: CPT | Performed by: NURSE PRACTITIONER

## 2024-01-14 PROCEDURE — 83735 ASSAY OF MAGNESIUM: CPT | Performed by: NURSE PRACTITIONER

## 2024-01-14 PROCEDURE — 0240U COVID/FLU A&B PCR: CPT | Performed by: NURSE PRACTITIONER

## 2024-01-14 PROCEDURE — 25000003 PHARM REV CODE 250: Performed by: NURSE PRACTITIONER

## 2024-01-14 PROCEDURE — 87086 URINE CULTURE/COLONY COUNT: CPT | Performed by: NURSE PRACTITIONER

## 2024-01-14 PROCEDURE — 99284 EMERGENCY DEPT VISIT MOD MDM: CPT | Mod: 25

## 2024-01-14 PROCEDURE — 81003 URINALYSIS AUTO W/O SCOPE: CPT | Performed by: NURSE PRACTITIONER

## 2024-01-14 PROCEDURE — 63600175 PHARM REV CODE 636 W HCPCS: Performed by: NURSE PRACTITIONER

## 2024-01-14 PROCEDURE — 93010 ELECTROCARDIOGRAM REPORT: CPT | Mod: ,,, | Performed by: INTERNAL MEDICINE

## 2024-01-14 PROCEDURE — 87040 BLOOD CULTURE FOR BACTERIA: CPT | Performed by: NURSE PRACTITIONER

## 2024-01-14 PROCEDURE — 96361 HYDRATE IV INFUSION ADD-ON: CPT

## 2024-01-14 PROCEDURE — 96365 THER/PROPH/DIAG IV INF INIT: CPT

## 2024-01-14 PROCEDURE — 80053 COMPREHEN METABOLIC PANEL: CPT | Performed by: NURSE PRACTITIONER

## 2024-01-14 PROCEDURE — 85730 THROMBOPLASTIN TIME PARTIAL: CPT | Performed by: NURSE PRACTITIONER

## 2024-01-14 PROCEDURE — 85025 COMPLETE CBC W/AUTO DIFF WBC: CPT | Performed by: NURSE PRACTITIONER

## 2024-01-14 PROCEDURE — 93005 ELECTROCARDIOGRAM TRACING: CPT

## 2024-01-14 PROCEDURE — 84443 ASSAY THYROID STIM HORMONE: CPT | Performed by: NURSE PRACTITIONER

## 2024-01-14 RX ORDER — CEFDINIR 300 MG/1
300 CAPSULE ORAL 2 TIMES DAILY
Qty: 14 CAPSULE | Refills: 0 | Status: SHIPPED | OUTPATIENT
Start: 2024-01-14 | End: 2024-01-21

## 2024-01-14 RX ORDER — SODIUM CHLORIDE 9 MG/ML
500 INJECTION, SOLUTION INTRAVENOUS
Status: COMPLETED | OUTPATIENT
Start: 2024-01-14 | End: 2024-01-14

## 2024-01-14 RX ADMIN — CEFTRIAXONE SODIUM 1 G: 1 INJECTION, POWDER, FOR SOLUTION INTRAMUSCULAR; INTRAVENOUS at 06:01

## 2024-01-14 RX ADMIN — SODIUM CHLORIDE 500 ML: 9 INJECTION, SOLUTION INTRAVENOUS at 06:01

## 2024-01-14 RX ADMIN — SODIUM CHLORIDE 500 ML: 9 INJECTION, SOLUTION INTRAVENOUS at 08:01

## 2024-01-15 NOTE — DISCHARGE INSTRUCTIONS
Continue to push hydration. Take iron supplements daily to help with anemia. Change positions slowly. Take cefdinir for urine infection for the 7 days.     Follow up with labs in 2 weeks to recheck electrolytes and creatinine as well as urine to make sure urine infection resolved. Continue to closely monitor blood pressure and document. Continue to hold blood pressure medication.     Follow up with cardiology if you continue to remain symptomatic and remain in atrial fibrillation. Possibly check in with watchman information.     If symptoms worsen, change, return to ER.

## 2024-01-15 NOTE — ED PROVIDER NOTES
Encounter Date: 1/14/2024       History     Chief Complaint   Patient presents with    Hypotension     From [facility]. Here for hypotension >2 weeks impacting rehab. Also just here for a medical exam for generalized labs.      See MDM    The history is provided by the patient. No  was used.     Review of patient's allergies indicates:   Allergen Reactions    Entresto [sacubitril-valsartan]     Hydrochlorothiazide      Other reaction(s): precaution     Past Medical History:   Diagnosis Date    Anticoagulant long-term use     Arthritis     CAD (coronary artery disease)     CHF (congestive heart failure)     HLD (hyperlipidemia)     [place] (hard of hearing)     HTN (hypertension)     Paroxysmal atrial fibrillation      Past Surgical History:   Procedure Laterality Date    CARDIOVERSION  04/12/2021    [facility]    CATARACT EXTRACTION Bilateral     CLOSURE OF LEFT ATRIAL APPENDAGE USING DEVICE N/A 01/24/2023    Procedure: CLOSURE, LEFT ATRIAL APPENDAGE, USING DEVICE;  Surgeon: Joce Layne MD;  Location: [facility] CATH LAB;  Service: Cardiology;  Laterality: N/A;  AMULET W/ INTRA OP [facility]    ECHOCARDIOGRAM,TRANSESOPHAGEAL N/A 01/24/2023    Procedure: ECHOCARDIOGRAM,TRANSESOPHAGEAL;  Surgeon: Chu Diagnostic Provider;  Location: [facility] CATH LAB;  Service: Cardiology;  Laterality: N/A;    EYE SURGERY  2000    [facility]    MULTIPLE TOOTH EXTRACTIONS      SINUS SURGERY       Family History   Problem Relation Age of Onset    Stroke Mother     Heart attack Mother     Hearing loss Mother     Prostate cancer Father     Kidney disease Son     Vision loss Sister      Social History     Tobacco Use    Smoking status: Former     Types: Cigarettes     Passive exposure: Never    Smokeless tobacco: Never    Tobacco comments:     Quit 30 - 35 years ago   Substance Use Topics    Alcohol use: Yes     Alcohol/week: 6.0 standard drinks of alcohol     Types: 6 Cans of beer per week     Comment: 5 or 6 dayla    Drug use: Never     Review  of Systems   Constitutional:         Low blood pressure   Neurological:  Positive for light-headedness.   All other systems reviewed and are negative.      Physical Exam     Initial Vitals [01/14/24 1722]   BP Pulse Resp Temp SpO2   (!) 97/59 86 20 98 °F (36.7 °C) 97 %      MAP       --         Physical Exam    Nursing note and vitals reviewed.  Constitutional: He appears well-developed and well-nourished.   HENT:   Right Ear: Tympanic membrane and ear canal normal.   Left Ear: Tympanic membrane and ear canal normal.   Mouth/Throat: Oropharynx is clear and moist.   Eyes: Conjunctivae are normal.   Cardiovascular:  Normal rate, regular rhythm and normal heart sounds.           Pulmonary/Chest: Breath sounds normal. No respiratory distress.   Abdominal: Abdomen is soft. He exhibits no distension. There is no abdominal tenderness.   Musculoskeletal:         General: Normal range of motion.      Comments: Bilateral UE and LE strength equal and strong. Lifts legs off bed well and high. Pushes against me strong. Pulls and pushes me with his upper extremities as well.      Neurological: He is alert and oriented to person, place, and time. He has normal strength.   Skin: Skin is warm and dry.   Psychiatric: He has a normal mood and affect.         ED Course   Procedures  Labs Reviewed   CBC WITH DIFFERENTIAL - Abnormal; Notable for the following components:       Result Value    RBC 3.35 (*)     Hgb 9.9 (*)     Hct 30.3 (*)     MCHC 32.7 (*)     IG# 0.18 (*)     All other components within normal limits   COMPREHENSIVE METABOLIC PANEL - Abnormal; Notable for the following components:    Sodium Level 129 (*)     Carbon Dioxide 19 (*)     Creatinine 1.58 (*)     Calcium Level Total 8.7 (*)     Albumin Level 2.8 (*)     Globulin 4.2 (*)     Albumin/Globulin Ratio 0.7 (*)     All other components within normal limits   URINALYSIS, REFLEX TO URINE CULTURE - Abnormal; Notable for the following components:    Appearance, UA Cloudy  (*)     Protein, UA 1+ (*)     Blood, UA 2+ (*)     Leukocyte Esterase, UA 3+ (*)     All other components within normal limits   URINALYSIS, MICROSCOPIC - Abnormal; Notable for the following components:    Bacteria, UA Few (*)     WBC, UA >=100 (*)     All other components within normal limits   MAGNESIUM - Normal   TSH - Normal   TROPONIN I - Normal   PROTIME-INR - Normal   APTT - Normal   COVID/FLU A&B PCR - Normal    Narrative:     The Xpert Xpress SARS-CoV-2/FLU/RSV plus is a rapid, multiplexed real-time PCR test intended for the simultaneous qualitative detection and differentiation of SARS-CoV-2, Influenza A, Influenza B, and respiratory syncytial virus (RSV) viral RNA in either nasopharyngeal swab or nasal swab specimens.         CULTURE, URINE   BLOOD CULTURE OLG   BLOOD CULTURE OLG     EKG Readings: (Independently Interpreted)   Initial Reading: No STEMI. Rhythm: Atrial Fibrillation. Heart Rate: 94. Ectopy: No Ectopy. Conduction: RBBB. Clinical Impression: Atrial Fibrillation with RBBB       Imaging Results    None          Medications   0.9%  NaCl infusion (0 mLs Intravenous Stopped 1/14/24 1929)   cefTRIAXone (ROCEPHIN) 1 g in dextrose 5 % in water (D5W) 100 mL IVPB (MB+) (0 g Intravenous Stopped 1/14/24 1929)   0.9%  NaCl infusion (0 mLs Intravenous Stopped 1/14/24 2111)     Medical Decision Making  84 y/o male who presents from [facility] / rehab for low blood pressure since Thursday with episodes of dizziness and lightheaded. They did stop his blood pressure medication (lisinopril). He is there due to PT from left hip fracture that is currently being treated non operatively. Thursday he felt very lightheaded with therapy so they stopped. Friday he also had a not great day with feeling lightheaded with changing positions. His bp both Thursday and Friday was in the 70's supposedly. Yesterday day was better and today however he wanted to get checked out. No pain. No sob. Denies n/v. States he is  eating and drinking.     Per review does have hx of paroxsymal afib (per chart on amiodarone and aspirin). Not taking lasix unless he has swelling which he hasn't so he hasn't taken it. Held lisinopril since Friday because Thursday he had already taken it before the episode. He was on cipro for UTI but completed it about a week ago. No fever.     Labs today show no leukocytosis, mild anemia (although comparable with previous and no dark/bloody stools). Creatinine elevated today 1.5 from 1.1 7 days ago. K normal. Calcium low but slightly higher than previous. Sodium comparable at 128 (was previous 129). UA does show bacteria, leuk and wbc so will certainly treat for UTI today as well. Do not see previous culture and was on cipro and still has UTI so will give rocephin here and place on cefdinir. Culture is pending. Blood cultures were drawn today as well. EKG shows afib with controlled rate of 94 and 80's on monitor. Gave him total of liter of fluids. He is feeling better and blood pressure has remained stable. Certainly seems this could be multifactoral s/t dehydration, UTI, anemia and him being currently in atrial fibrillation since last EKG 3/2023 showed he was in NSR. We discussed treating UTI, gentle iron for the anemia, continue hydration. Monitor closely his blood pressure. Repeat labs to check on anemia and creatinine and electolytes. If still symptomatic cards certainly can be involved with the afib for possible needing adjustment of his amio?     Did also discuss with him and family regarding imaging of head or chest and he is comfortable not doing this currently. States he has had imaging of his head and it was okay. He is scheduled to do outpatient CT hip per dr. Flood on 1/16. He again is not sob or having chest pain and he is currently asymptomatic with his lightheadedness.     Close ER precautions given. Will plan to discharge back to [facility].       Amount and/or Complexity of Data  Reviewed  Independent Historian: spouse     Details: Wife and daughter at bedside. Discussed his low bp since Thursday with most symptomatic days Thursday and Friday. Discussed he stopped bp med Friday once episode happened Thursday. They are aware of labs and plan.   External Data Reviewed: labs.     Details: Reviewed labs from 7 days ago. Creat 1.1. H&H 10/ 30    Reviewed dr. Silveira note from office visit on 1/9/24  Labs: ordered. Decision-making details documented in ED Course.  ECG/medicine tests: ordered and independent interpretation performed. Decision-making details documented in ED Course.    Risk  Prescription drug management.      Additional MDM:   Differential Diagnosis:   Other: The following diagnoses were also considered and will be evaluated: dehydration, UTI and anemia.                                   Clinical Impression:  Final diagnoses:  [I95.9] Hypotension  [I95.9] Hypotension, unspecified hypotension type (Primary)  [D64.9] Anemia, unspecified type  [N30.00] Acute cystitis without hematuria  [I48.0] Paroxysmal atrial fibrillation  [E86.0] Dehydration          ED Disposition Condition    Discharge Stable          ED Prescriptions       Medication Sig Dispense Start Date End Date Auth. Provider    cefdinir (OMNICEF) 300 MG capsule Take 1 capsule (300 mg total) by mouth 2 (two) times daily. for 7 days 14 capsule 1/14/2024 1/21/2024 Ashley Ya FNP          Follow-up Information       Follow up With Specialties Details Why Contact Kaylyn Hammonds, DO Family Medicine Call in 1 week As needed [address]  [facility] [address]  [phone]               Ashley Ya FNP  01/14/24 1199

## 2024-01-16 ENCOUNTER — HOSPITAL ENCOUNTER (OUTPATIENT)
Dept: RADIOLOGY | Facility: HOSPITAL | Age: 84
Discharge: HOME | End: 2024-01-16
Attending: ORTHOPAEDIC SURGERY
Payer: MEDICARE

## 2024-01-16 DIAGNOSIS — S72.002D CLOSED LEFT HIP FRACTURE, WITH ROUTINE HEALING, SUBSEQUENT ENCOUNTER: ICD-10-CM

## 2024-01-16 DIAGNOSIS — T14.8XXA FRACTURE: ICD-10-CM

## 2024-01-16 DIAGNOSIS — S72.112A DISPLACED FRACTURE OF GREATER TROCHANTER OF LEFT FEMUR, INITIAL ENCOUNTER FOR CLOSED FRACTURE: ICD-10-CM

## 2024-01-16 PROCEDURE — 73502 X-RAY EXAM HIP UNI 2-3 VIEWS: CPT | Mod: TC,LT

## 2024-01-16 PROCEDURE — 73700 CT LOWER EXTREMITY W/O DYE: CPT | Mod: TC,LT

## 2024-01-17 LAB
BACTERIA UR CULT: ABNORMAL

## 2024-01-18 ENCOUNTER — LAB REQUISITION (OUTPATIENT)
Dept: LAB | Facility: HOSPITAL | Age: 84
End: 2024-01-18
Payer: MEDICARE

## 2024-01-18 DIAGNOSIS — R74.02 ELEVATION OF LEVELS OF LACTIC ACID DEHYDROGENASE (LDH): ICD-10-CM

## 2024-01-18 DIAGNOSIS — R53.1 WEAKNESS: ICD-10-CM

## 2024-01-18 DIAGNOSIS — R94.31 ABNORMAL ELECTROCARDIOGRAM (ECG) (EKG): ICD-10-CM

## 2024-01-18 LAB
ANION GAP SERPL CALC-SCNC: 9 MEQ/L
BUN SERPL-MCNC: 11 MG/DL (ref 8.4–25.7)
CALCIUM SERPL-MCNC: 7.7 MG/DL (ref 8.8–10)
CHLORIDE SERPL-SCNC: 101 MMOL/L (ref 98–107)
CO2 SERPL-SCNC: 21 MMOL/L (ref 23–31)
CREAT SERPL-MCNC: 1.22 MG/DL (ref 0.73–1.18)
CREAT/UREA NIT SERPL: 9
ERYTHROCYTE [DISTWIDTH] IN BLOOD BY AUTOMATED COUNT: 15.2 % (ref 11.5–17)
GFR SERPLBLD CREATININE-BSD FMLA CKD-EPI: 60 MLS/MIN/1.73/M2
GLUCOSE SERPL-MCNC: 107 MG/DL (ref 82–115)
HCT VFR BLD AUTO: 24.8 % (ref 42–52)
HGB BLD-MCNC: 8.2 G/DL (ref 14–18)
LACTATE SERPL-SCNC: 1.6 MMOL/L (ref 0.5–2.2)
MCH RBC QN AUTO: 29.3 PG (ref 27–31)
MCHC RBC AUTO-ENTMCNC: 33.1 G/DL (ref 33–36)
MCV RBC AUTO: 88.6 FL (ref 80–94)
PLATELET # BLD AUTO: 237 X10(3)/MCL (ref 130–400)
PMV BLD AUTO: 9.6 FL (ref 7.4–10.4)
POTASSIUM SERPL-SCNC: 3.7 MMOL/L (ref 3.5–5.1)
RBC # BLD AUTO: 2.8 X10(6)/MCL (ref 4.7–6.1)
SODIUM SERPL-SCNC: 131 MMOL/L (ref 136–145)
WBC # SPEC AUTO: 7.75 X10(3)/MCL (ref 4.5–11.5)

## 2024-01-18 PROCEDURE — 83605 ASSAY OF LACTIC ACID: CPT | Performed by: INTERNAL MEDICINE

## 2024-01-18 PROCEDURE — 80048 BASIC METABOLIC PNL TOTAL CA: CPT | Performed by: INTERNAL MEDICINE

## 2024-01-18 PROCEDURE — 85027 COMPLETE CBC AUTOMATED: CPT | Performed by: INTERNAL MEDICINE

## 2024-01-20 LAB
BACTERIA BLD CULT: NORMAL
BACTERIA BLD CULT: NORMAL

## 2024-01-30 ENCOUNTER — OFFICE VISIT (OUTPATIENT)
Dept: ORTHOPEDICS | Facility: CLINIC | Age: 84
End: 2024-01-30
Payer: MEDICARE

## 2024-01-30 VITALS
DIASTOLIC BLOOD PRESSURE: 66 MMHG | TEMPERATURE: 98 F | HEART RATE: 85 BPM | WEIGHT: 166 LBS | SYSTOLIC BLOOD PRESSURE: 112 MMHG | HEIGHT: 67 IN | BODY MASS INDEX: 26.06 KG/M2

## 2024-01-30 DIAGNOSIS — S72.002D CLOSED LEFT HIP FRACTURE, WITH ROUTINE HEALING, SUBSEQUENT ENCOUNTER: Primary | ICD-10-CM

## 2024-01-30 DIAGNOSIS — S72.112A DISPLACED FRACTURE OF GREATER TROCHANTER OF LEFT FEMUR, INITIAL ENCOUNTER FOR CLOSED FRACTURE: ICD-10-CM

## 2024-01-30 PROCEDURE — 99024 POSTOP FOLLOW-UP VISIT: CPT | Mod: POP,,, | Performed by: NURSE PRACTITIONER

## 2024-01-30 NOTE — PROGRESS NOTES
Chief Complaint:   Chief Complaint   Patient presents with    Left Hip - Follow-up     Pt is present to go over his CT scan results---Lt hip fracture 12/02/2023.       History of present illness: Garrison Garrett is a 83 y.o. male, presents to clinic today for follow up of CT scan of the left hip. CT results show no signs of fracture medially.  He is ambulating with the assistance of a walker.  Has been discharged since at home since.  Working with home health physical therapy.  Overall he is doing well.  Denies any pain or discomfort.    Past Medical History:   Diagnosis Date    Anticoagulant long-term use     Arthritis     CAD (coronary artery disease)     CHF (congestive heart failure)     HLD (hyperlipidemia)     [place] (hard of hearing)     HTN (hypertension)     Paroxysmal atrial fibrillation        Past Surgical History:   Procedure Laterality Date    CARDIOVERSION  04/12/2021    [facility]    CATARACT EXTRACTION Bilateral     CLOSURE OF LEFT ATRIAL APPENDAGE USING DEVICE N/A 01/24/2023    Procedure: CLOSURE, LEFT ATRIAL APPENDAGE, USING DEVICE;  Surgeon: Joce Layne MD;  Location: [facility] CATH LAB;  Service: Cardiology;  Laterality: N/A;  AMULET W/ INTRA OP [facility]    ECHOCARDIOGRAM,TRANSESOPHAGEAL N/A 01/24/2023    Procedure: ECHOCARDIOGRAM,TRANSESOPHAGEAL;  Surgeon: Chu Diagnostic Provider;  Location: [facility] CATH LAB;  Service: Cardiology;  Laterality: N/A;    EYE SURGERY  2000    [facility]    MULTIPLE TOOTH EXTRACTIONS      SINUS SURGERY         Current Outpatient Medications   Medication Sig    amiodarone (PACERONE) 200 MG Tab Take 1 tablet (200 mg total) by mouth once daily.    atorvastatin (LIPITOR) 40 MG tablet Take 40 mg by mouth once daily.    furosemide (LASIX) 40 MG tablet Take 40 mg by mouth every Mon, Wed, Fri.    lisinopriL (PRINIVIL,ZESTRIL) 20 MG tablet Take 20 mg by mouth.    methocarbamoL (ROBAXIN) 750 MG Tab Take 750 mg by mouth 3 (three) times daily.    sacubitriL-valsartan (ENTRESTO) 24-26 mg per  tablet Take 2 tablets by mouth once daily. Patient takes one in the morning and one at night    valsartan (DIOVAN) 40 MG tablet Take 40 mg by mouth 2 (two) times daily.    aspirin (ECOTRIN) 81 MG EC tablet Take 1 tablet (81 mg total) by mouth once daily.    naproxen (NAPROSYN) 500 MG tablet Take 500 mg by mouth 2 (two) times daily.     No current facility-administered medications for this visit.       Review of patient's allergies indicates:   Allergen Reactions    Entresto [sacubitril-valsartan]     Hydrochlorothiazide      Other reaction(s): precaution       Family History   Problem Relation Age of Onset    Stroke Mother     Heart attack Mother     Hearing loss Mother     Prostate cancer Father     Kidney disease Son     Vision loss Sister        Social History     Socioeconomic History    Marital status:    Tobacco Use    Smoking status: Former     Types: Cigarettes     Passive exposure: Never    Smokeless tobacco: Never    Tobacco comments:     Quit 30 - 35 years ago   Substance and Sexual Activity    Alcohol use: Yes     Alcohol/week: 6.0 standard drinks of alcohol     Types: 6 Cans of beer per week     Comment: Daily    Drug use: Never    Sexual activity: Not Currently     Partners: Female     Birth control/protection: Implant           Review of Systems:    Denies fevers, chills, chest pain, shortness of breath. Comprehensive review of systems performed and otherwise negative except as noted in HPI     Physical Examination:    General: awake and alert, no acute distress, healthy appearing  Head and Neck: Head atraumatic/normocephalic. Moist MM  CV: brisk cap refill  Lungs: non-labored breathing, w/o cough or SOB  Skin: no rashes present, warm to touch  Neuro: sensation grossly intact distally       Vital Signs:    Vitals:    01/30/24 1340   BP: 112/66   Pulse: 85   Temp: 97.6 °F (36.4 °C)       Body mass index is 26 kg/m².    Left hip exam:    No swelling distally. No signs of DVT  Brisk cap refill  right foot  Sensation intact distally to right foot  Positive FHL/EHL/gastrocsoleus/tib ant     X-rays:   CT HIP WITHOUT CONTRAST LEFT     CLINICAL HISTORY:  Fracture, hip;, Fracture of unspecified part of neck of left femur, subsequent encounter for closed fracture with routine healing.     TECHNIQUE:  PATIENT RADIATION DOSE: DLP(mGycm) 210     As per PQRS measures, all CT scans at this facility used dose modulation, iterative reconstruction, and/or weight based dose adjustment when appropriate to reduce radiation dose to as low as reasonably achievable.     COMPARISON:  None available     FINDINGS:  Serial axial images changes are noted to the left hip without the administration of IV contrast.  Both soft tissue and bone windows were performed.  Additional sagittal and coronal reconstructions were obtained.  There is a slightly displaced oblique mildly comminuted fracture at the left greater trochanter.  No other definite fracture or dislocation is identified.  There is minimal narrowing of the left hip joint.  Bony structures are osteopenic.  Extensive atherosclerosis is identified.  No aggressive osteolytic or osteoblastic lesion is seen.  The bladder is distended with fluid.  Enthesophyte formation is evident at the inferior ischium.  No significant effusion is appreciated.     Impression:     1. Mildly comminuted slightly displaced oblique fracture at the left greater trochanter  2. Osteopenia  3. Mild osteoarthritis  4. Extensive atherosclerosis  5. MR examination would allow further evaluation if clinically indicated     Assessment: s/p fracture of the left greater trochanter    Plan:  Patient presents to clinic today for results of CT scan.  CT scan showed there is some interval callus noted the fracture site and that it does not extend into the medial aspect of the femoral neck.  We will continue to let him ambulate as tolerated.  He is to continue physical therapy for strengthening and range of motion.   Also was educated he can switch to outpatient therapy if needed.  We will have him follow up in about 6 weeks with x-rays of that hip to reassess his fracture site.  Patient states understanding and agrees with plan of care.    The above findings, diagnostics, and treatment plan were discussed with Dr. Hans Flood who is in agreement with the plan of care.      This note was created using [employer] voice recognition software that occasionally misinterpreted phrases or words.    Consult note is delivered via Epic messaging service.

## 2024-02-06 ENCOUNTER — OFFICE VISIT (OUTPATIENT)
Dept: FAMILY MEDICINE | Facility: CLINIC | Age: 84
End: 2024-02-06
Payer: MEDICARE

## 2024-02-06 VITALS
OXYGEN SATURATION: 97 % | SYSTOLIC BLOOD PRESSURE: 122 MMHG | DIASTOLIC BLOOD PRESSURE: 76 MMHG | WEIGHT: 164 LBS | HEIGHT: 67 IN | RESPIRATION RATE: 16 BRPM | HEART RATE: 98 BPM | BODY MASS INDEX: 25.74 KG/M2 | TEMPERATURE: 98 F

## 2024-02-06 DIAGNOSIS — J40 BRONCHITIS: ICD-10-CM

## 2024-02-06 DIAGNOSIS — J18.9 PNEUMONIA DUE TO INFECTIOUS ORGANISM, UNSPECIFIED LATERALITY, UNSPECIFIED PART OF LUNG: Primary | ICD-10-CM

## 2024-02-06 PROCEDURE — 99214 OFFICE O/P EST MOD 30 MIN: CPT | Mod: ,,, | Performed by: STUDENT IN AN ORGANIZED HEALTH CARE EDUCATION/TRAINING PROGRAM

## 2024-02-06 RX ORDER — BENZONATATE 100 MG/1
100 CAPSULE ORAL
COMMUNITY
Start: 2024-02-01 | End: 2024-02-06 | Stop reason: SDUPTHER

## 2024-02-06 RX ORDER — BENZONATATE 100 MG/1
100 CAPSULE ORAL 3 TIMES DAILY PRN
Qty: 30 CAPSULE | Refills: 0 | Status: SHIPPED | OUTPATIENT
Start: 2024-02-06 | End: 2024-04-22

## 2024-02-06 RX ORDER — AMOXICILLIN AND CLAVULANATE POTASSIUM 875; 125 MG/1; MG/1
1 TABLET, FILM COATED ORAL 2 TIMES DAILY
COMMUNITY
Start: 2024-02-01 | End: 2024-02-18

## 2024-02-06 RX ORDER — PREDNISONE 10 MG/1
10 TABLET ORAL
COMMUNITY
Start: 2024-02-01 | End: 2024-02-06

## 2024-02-06 NOTE — PROGRESS NOTES
"Subjective:      Patient ID: Garrison Garrett is a 83 y.o.  male. He is accompanied by his wife, Mrs. Nadia Garrett.    Chief Complaint: Bronchitis/Pneumonia Follow-Up    Pneumonia Follow-Up: He was evaluated by Urgent Care on 02/01/24 and diagnosed with bronchitis/pneumonia. CXR impression positive for left parahilar and left basilar lower lobe subsegmental pulmonary infiltrates. He was prescribed Augmentin and Prednisone. He's almost completed the Augmentin, but has already completed the 5-day course of Prednisone. He has a residual cough and asking for something to help with that. He is also reporting some right-sided rib pain due to coughing.    Review of Systems   Constitutional:  Negative for activity change, appetite change, chills, diaphoresis, fatigue, fever and unexpected weight change.   Eyes:  Negative for visual disturbance.   Respiratory:  Positive for cough. Negative for apnea, shortness of breath, wheezing and stridor.    Cardiovascular:  Negative for chest pain, palpitations and leg swelling.   Gastrointestinal:  Negative for abdominal pain, blood in stool, constipation, diarrhea, nausea and vomiting.   Genitourinary:  Negative for dysuria and hematuria.   Musculoskeletal:  Positive for myalgias (right-sided ribs).   Skin:  Negative for rash and wound.   Neurological:  Negative for dizziness, syncope, weakness, numbness and headaches.   Psychiatric/Behavioral:  Negative for behavioral problems, dysphoric mood and sleep disturbance. The patient is not nervous/anxious.      Objective:   /76 (BP Location: Left arm)   Pulse 98   Temp 97.6 °F (36.4 °C) (Oral)   Resp 16   Ht 5' 7" (1.702 m)   Wt 74.4 kg (164 lb)   SpO2 97%   BMI 25.69 kg/m²     Physical Exam  Vitals and nursing note reviewed.   Constitutional:       General: He is not in acute distress.     Appearance: Normal appearance. He is not ill-appearing or toxic-appearing.   HENT:      Head: Normocephalic and atraumatic. "   Eyes:      Conjunctiva/sclera: Conjunctivae normal.   Cardiovascular:      Rate and Rhythm: Normal rate and regular rhythm.      Heart sounds: Murmur heard.   Pulmonary:      Effort: Pulmonary effort is normal. No respiratory distress.      Breath sounds: Normal breath sounds. No stridor. No wheezing.   Abdominal:      General: Bowel sounds are normal. There is no distension.      Palpations: Abdomen is soft.      Tenderness: There is no abdominal tenderness.   Musculoskeletal:         General: No deformity.      Right lower leg: No edema.      Left lower leg: No edema.   Skin:     General: Skin is warm and dry.      Findings: No lesion or rash.   Neurological:      General: No focal deficit present.      Mental Status: He is alert.      Motor: Weakness (generalized) present.      Coordination: Coordination normal.   Psychiatric:         Mood and Affect: Mood normal.         Behavior: Behavior normal.         Thought Content: Thought content normal.         Judgment: Judgment normal.       Assessment/Plan:   1. Pneumonia due to infectious organism, unspecified laterality, unspecified part of lung  Comments:  - Symptoms improving.  - Complete Augmentin.  - Continue symptomatic treatment.  Orders:  -     benzonatate (TESSALON) 100 MG capsule; Take 1 capsule (100 mg total) by mouth 3 (three) times daily as needed for Cough.  Dispense: 30 capsule; Refill: 0    2. Bronchitis  Comments:  - Refer to Pneumonia plan.  Orders:  -     benzonatate (TESSALON) 100 MG capsule; Take 1 capsule (100 mg total) by mouth 3 (three) times daily as needed for Cough.  Dispense: 30 capsule; Refill: 0       Follow up in about 3 months (around 5/6/2024) for Medicare Wellness.

## 2024-02-08 ENCOUNTER — EXTERNAL HOME HEALTH (OUTPATIENT)
Dept: HOME HEALTH SERVICES | Facility: HOSPITAL | Age: 84
End: 2024-02-08
Payer: MEDICARE

## 2024-02-17 PROCEDURE — 81003 URINALYSIS AUTO W/O SCOPE: CPT | Performed by: INTERNAL MEDICINE

## 2024-02-17 PROCEDURE — 99284 EMERGENCY DEPT VISIT MOD MDM: CPT | Mod: 25

## 2024-02-18 ENCOUNTER — HOSPITAL ENCOUNTER (EMERGENCY)
Facility: HOSPITAL | Age: 84
Discharge: HOME | End: 2024-02-18
Attending: INTERNAL MEDICINE
Payer: MEDICARE

## 2024-02-18 VITALS
DIASTOLIC BLOOD PRESSURE: 72 MMHG | RESPIRATION RATE: 20 BRPM | BODY MASS INDEX: 24.81 KG/M2 | HEART RATE: 99 BPM | TEMPERATURE: 99 F | OXYGEN SATURATION: 97 % | WEIGHT: 158.38 LBS | SYSTOLIC BLOOD PRESSURE: 130 MMHG

## 2024-02-18 DIAGNOSIS — R30.0 DYSURIA: ICD-10-CM

## 2024-02-18 DIAGNOSIS — E86.0 DEHYDRATION: ICD-10-CM

## 2024-02-18 DIAGNOSIS — N39.0 URINARY TRACT INFECTION WITH HEMATURIA, SITE UNSPECIFIED: Primary | ICD-10-CM

## 2024-02-18 DIAGNOSIS — R31.9 URINARY TRACT INFECTION WITH HEMATURIA, SITE UNSPECIFIED: Primary | ICD-10-CM

## 2024-02-18 LAB
ALBUMIN SERPL-MCNC: 2.4 G/DL (ref 3.4–4.8)
ALBUMIN/GLOB SERPL: 0.4 RATIO (ref 1.1–2)
ALP SERPL-CCNC: 70 UNIT/L (ref 40–150)
ALT SERPL-CCNC: 47 UNIT/L (ref 0–55)
APPEARANCE UR: ABNORMAL
AST SERPL-CCNC: 51 UNIT/L (ref 5–34)
BACTERIA #/AREA URNS AUTO: ABNORMAL /HPF
BASOPHILS # BLD AUTO: 0.03 X10(3)/MCL
BASOPHILS NFR BLD AUTO: 0.2 %
BILIRUB SERPL-MCNC: 0.4 MG/DL
BILIRUB UR QL STRIP.AUTO: ABNORMAL
BUN SERPL-MCNC: 22 MG/DL (ref 8.4–25.7)
CALCIUM SERPL-MCNC: 9.5 MG/DL (ref 8.8–10)
CHLORIDE SERPL-SCNC: 100 MMOL/L (ref 98–107)
CO2 SERPL-SCNC: 20 MMOL/L (ref 23–31)
COLOR UR AUTO: ABNORMAL
CREAT SERPL-MCNC: 1.47 MG/DL (ref 0.73–1.18)
EOSINOPHIL # BLD AUTO: 0.03 X10(3)/MCL (ref 0–0.9)
EOSINOPHIL NFR BLD AUTO: 0.2 %
ERYTHROCYTE [DISTWIDTH] IN BLOOD BY AUTOMATED COUNT: 15.9 % (ref 11.5–17)
GFR SERPLBLD CREATININE-BSD FMLA CKD-EPI: 48 MLS/MIN/1.73/M2
GLOBULIN SER-MCNC: 5.8 GM/DL (ref 2.4–3.5)
GLUCOSE SERPL-MCNC: 154 MG/DL (ref 82–115)
GLUCOSE UR QL STRIP.AUTO: ABNORMAL
HCT VFR BLD AUTO: 33.8 % (ref 42–52)
HGB BLD-MCNC: 10.5 G/DL (ref 14–18)
IMM GRANULOCYTES # BLD AUTO: 0.08 X10(3)/MCL (ref 0–0.04)
IMM GRANULOCYTES NFR BLD AUTO: 0.6 %
KETONES UR QL STRIP.AUTO: NEGATIVE
LEUKOCYTE ESTERASE UR QL STRIP.AUTO: NEGATIVE
LYMPHOCYTES # BLD AUTO: 1.21 X10(3)/MCL (ref 0.6–4.6)
LYMPHOCYTES NFR BLD AUTO: 8.9 %
MCH RBC QN AUTO: 27.3 PG (ref 27–31)
MCHC RBC AUTO-ENTMCNC: 31.1 G/DL (ref 33–36)
MCV RBC AUTO: 88 FL (ref 80–94)
MONOCYTES # BLD AUTO: 0.87 X10(3)/MCL (ref 0.1–1.3)
MONOCYTES NFR BLD AUTO: 6.4 %
NEUTROPHILS # BLD AUTO: 11.44 X10(3)/MCL (ref 2.1–9.2)
NEUTROPHILS NFR BLD AUTO: 83.7 %
NITRITE UR QL STRIP.AUTO: POSITIVE
PH UR STRIP.AUTO: 7.5 [PH]
PLATELET # BLD AUTO: 579 X10(3)/MCL (ref 130–400)
PMV BLD AUTO: 8.8 FL (ref 7.4–10.4)
POTASSIUM SERPL-SCNC: 4.1 MMOL/L (ref 3.5–5.1)
PROT SERPL-MCNC: 8.2 GM/DL (ref 5.8–7.6)
PROT UR QL STRIP.AUTO: ABNORMAL
RBC # BLD AUTO: 3.84 X10(6)/MCL (ref 4.7–6.1)
RBC #/AREA URNS AUTO: >100 /HPF
RBC UR QL AUTO: ABNORMAL
SODIUM SERPL-SCNC: 134 MMOL/L (ref 136–145)
SP GR UR STRIP.AUTO: 1.02 (ref 1–1.03)
SQUAMOUS #/AREA URNS AUTO: ABNORMAL /HPF
UROBILINOGEN UR STRIP-ACNC: 0.2
WBC # SPEC AUTO: 13.66 X10(3)/MCL (ref 4.5–11.5)
WBC #/AREA URNS AUTO: ABNORMAL /HPF

## 2024-02-18 PROCEDURE — 96374 THER/PROPH/DIAG INJ IV PUSH: CPT

## 2024-02-18 PROCEDURE — 87040 BLOOD CULTURE FOR BACTERIA: CPT | Performed by: INTERNAL MEDICINE

## 2024-02-18 PROCEDURE — 63600175 PHARM REV CODE 636 W HCPCS: Performed by: INTERNAL MEDICINE

## 2024-02-18 PROCEDURE — 25000003 PHARM REV CODE 250: Performed by: INTERNAL MEDICINE

## 2024-02-18 PROCEDURE — 80053 COMPREHEN METABOLIC PANEL: CPT | Performed by: INTERNAL MEDICINE

## 2024-02-18 PROCEDURE — 87086 URINE CULTURE/COLONY COUNT: CPT

## 2024-02-18 PROCEDURE — 85025 COMPLETE CBC W/AUTO DIFF WBC: CPT | Performed by: INTERNAL MEDICINE

## 2024-02-18 RX ORDER — NITROFURANTOIN 25; 75 MG/1; MG/1
100 CAPSULE ORAL 2 TIMES DAILY
Qty: 14 CAPSULE | Refills: 0 | Status: ON HOLD | OUTPATIENT
Start: 2024-02-18 | End: 2024-02-24 | Stop reason: HOSPADM

## 2024-02-18 RX ORDER — SODIUM CHLORIDE 9 MG/ML
1000 INJECTION, SOLUTION INTRAVENOUS
Status: COMPLETED | OUTPATIENT
Start: 2024-02-18 | End: 2024-02-18

## 2024-02-18 RX ORDER — CEFTRIAXONE 1 G/1
1 INJECTION, POWDER, FOR SOLUTION INTRAMUSCULAR; INTRAVENOUS
Status: COMPLETED | OUTPATIENT
Start: 2024-02-18 | End: 2024-02-18

## 2024-02-18 RX ADMIN — CEFTRIAXONE SODIUM 1 G: 1 INJECTION, POWDER, FOR SOLUTION INTRAMUSCULAR; INTRAVENOUS at 01:02

## 2024-02-18 RX ADMIN — SODIUM CHLORIDE 1000 ML: 9 INJECTION, SOLUTION INTRAVENOUS at 12:02

## 2024-02-18 NOTE — ED PROVIDER NOTES
Encounter Date: 2/17/2024       History     Chief Complaint   Patient presents with    Urinary Frequency     States began a few days ago with urinary frequency and noticed today blood in urine.      83-year-old white male presents emergency department complaining of urinary frequency for the past few days with brought him to the emergency department tonight as he began noticing blood in his urine      Review of patient's allergies indicates:   Allergen Reactions    Doxazosin Other (See Comments)     dizziness    Entresto [sacubitril-valsartan]     Hydrochlorothiazide      Other reaction(s): precaution     Past Medical History:   Diagnosis Date    Anticoagulant long-term use     Arthritis     CAD (coronary artery disease)     CHF (congestive heart failure)     HLD (hyperlipidemia)     [place] (hard of hearing)     HTN (hypertension)     Paroxysmal atrial fibrillation      Past Surgical History:   Procedure Laterality Date    CARDIOVERSION  04/12/2021    [facility]    CATARACT EXTRACTION Bilateral     CLOSURE OF LEFT ATRIAL APPENDAGE USING DEVICE N/A 01/24/2023    Procedure: CLOSURE, LEFT ATRIAL APPENDAGE, USING DEVICE;  Surgeon: Joce Layne MD;  Location: [facility] CATH LAB;  Service: Cardiology;  Laterality: N/A;  AMULET W/ INTRA OP [facility]    ECHOCARDIOGRAM,TRANSESOPHAGEAL N/A 01/24/2023    Procedure: ECHOCARDIOGRAM,TRANSESOPHAGEAL;  Surgeon: Chu Diagnostic Provider;  Location: [facility] CATH LAB;  Service: Cardiology;  Laterality: N/A;    EYE SURGERY  2000    [facility]    MULTIPLE TOOTH EXTRACTIONS      SINUS SURGERY       Family History   Problem Relation Age of Onset    Stroke Mother     Heart attack Mother     Hearing loss Mother     Prostate cancer Father     Kidney disease Son     Vision loss Sister      Social History     Tobacco Use    Smoking status: Former     Types: Cigarettes     Passive exposure: Never    Smokeless tobacco: Never    Tobacco comments:     Quit 30 - 35 years ago   Substance Use Topics    Alcohol use: Yes      Alcohol/week: 6.0 standard drinks of alcohol     Types: 6 Cans of beer per week     Comment: Daily    Drug use: Never     Review of Systems   Constitutional: Negative.  Negative for activity change, appetite change, chills, diaphoresis, fatigue, fever and unexpected weight change.   HENT: Negative.  Negative for congestion, dental problem, drooling, ear discharge, ear pain, facial swelling, hearing loss, mouth sores, nosebleeds, postnasal drip, rhinorrhea, sinus pressure, sinus pain, sneezing, sore throat, tinnitus, trouble swallowing and voice change.    Eyes: Negative.  Negative for photophobia, pain, discharge, redness, itching and visual disturbance.   Respiratory: Negative.  Negative for apnea, cough, choking, chest tightness, shortness of breath, wheezing and stridor.    Cardiovascular: Negative.  Negative for chest pain, palpitations and leg swelling.   Gastrointestinal: Negative.  Negative for abdominal distention, abdominal pain, anal bleeding, blood in stool, constipation, diarrhea, nausea, rectal pain and vomiting.   Endocrine: Negative.  Negative for cold intolerance, heat intolerance, polydipsia, polyphagia and polyuria.   Genitourinary:  Positive for frequency, hematuria and urgency. Negative for decreased urine volume, difficulty urinating, dysuria, enuresis, flank pain, genital sores, penile discharge, penile pain, penile swelling, scrotal swelling and testicular pain.   Musculoskeletal: Negative.  Negative for arthralgias, back pain, gait problem, joint swelling, myalgias, neck pain and neck stiffness.   Skin: Negative.  Negative for color change, pallor, rash and wound.   Allergic/Immunologic: Negative.  Negative for environmental allergies, food allergies and immunocompromised state.   Neurological: Negative.  Negative for dizziness, tremors, seizures, syncope, facial asymmetry, speech difficulty, weakness, light-headedness, numbness and headaches.   Hematological: Negative.  Negative for  adenopathy. Does not bruise/bleed easily.   Psychiatric/Behavioral: Negative.  Negative for agitation, behavioral problems, confusion, decreased concentration, dysphoric mood, hallucinations, self-injury, sleep disturbance and suicidal ideas. The patient is not nervous/anxious and is not hyperactive.    All other systems reviewed and are negative.      Physical Exam     Initial Vitals [02/17/24 2344]   BP Pulse Resp Temp SpO2   137/65 (!) 134 18 98.6 °F (37 °C) 97 %      MAP       --         Physical Exam    Nursing note and vitals reviewed.  Constitutional: He appears well-developed and well-nourished.   HENT:   Head: Normocephalic and atraumatic.   Eyes: Conjunctivae and EOM are normal. Pupils are equal, round, and reactive to light.   Neck: Neck supple.   Normal range of motion.  Cardiovascular:  Regular rhythm.           Tachycardia   Pulmonary/Chest: Breath sounds normal.   Abdominal: Abdomen is soft. Bowel sounds are normal.   Musculoskeletal:         General: Normal range of motion.      Cervical back: Normal range of motion and neck supple.     Neurological: He is alert and oriented to person, place, and time.   Skin: Skin is warm and dry. Capillary refill takes less than 2 seconds.   Psychiatric: He has a normal mood and affect. His behavior is normal. Judgment and thought content normal.         ED Course   Procedures    Admission on 02/18/2024   Component Date Value Ref Range Status    Color, UA 02/17/2024 Red (A)  Yellow, Light-Yellow, Dark Yellow, Ana, Straw Final    Appearance, UA 02/17/2024 Cloudy (A)  Clear Final    Specific Gravity, UA 02/17/2024 1.025  1.005 - 1.030 Final    pH, UA 02/17/2024 7.5  5.0 - 8.5 Final    Protein, UA 02/17/2024 3+ (A)  Negative Final    Glucose, UA 02/17/2024 Trace (A)  Negative, Normal Final    Ketones, UA 02/17/2024 Negative  Negative Final    Blood, UA 02/17/2024 3+ (A)  Negative Final    Bilirubin, UA 02/17/2024 1+ (A)  Negative Final    Urobilinogen, UA 02/17/2024  0.2  0.2, 1.0, Normal Final    Nitrites, UA 02/17/2024 Positive (A)  Negative Final    Leukocyte Esterase, UA 02/17/2024 Negative  Negative Final    Bacteria, UA 02/17/2024 Rare  None Seen, Rare, Occasional /HPF Final    RBC, UA 02/17/2024 >100 (A)  None Seen, 0-2, 3-5, 0-5 /HPF Final    WBC, UA 02/17/2024 11-20 (A)  None Seen, 0-2, 3-5, 0-5 /HPF Final    Squamous Epithelial Cells, UA 02/17/2024 Few (A)  None Seen, Rare, Occasional, Occ /HPF Final    Sodium Level 02/18/2024 134 (L)  136 - 145 mmol/L Final    Potassium Level 02/18/2024 4.1  3.5 - 5.1 mmol/L Final    Chloride 02/18/2024 100  98 - 107 mmol/L Final    Carbon Dioxide 02/18/2024 20 (L)  23 - 31 mmol/L Final    Glucose Level 02/18/2024 154 (H)  82 - 115 mg/dL Final    Blood Urea Nitrogen 02/18/2024 22.0  8.4 - 25.7 mg/dL Final    Creatinine 02/18/2024 1.47 (H)  0.73 - 1.18 mg/dL Final    Calcium Level Total 02/18/2024 9.5  8.8 - 10.0 mg/dL Final    Protein Total 02/18/2024 8.2 (H)  5.8 - 7.6 gm/dL Final    Albumin Level 02/18/2024 2.4 (L)  3.4 - 4.8 g/dL Final    Globulin 02/18/2024 5.8 (H)  2.4 - 3.5 gm/dL Final    Albumin/Globulin Ratio 02/18/2024 0.4 (L)  1.1 - 2.0 ratio Final    Bilirubin Total 02/18/2024 0.4  <=1.5 mg/dL Final    Alkaline Phosphatase 02/18/2024 70  40 - 150 unit/L Final    Alanine Aminotransferase 02/18/2024 47  0 - 55 unit/L Final    Aspartate Aminotransferase 02/18/2024 51 (H)  5 - 34 unit/L Final    eGFR 02/18/2024 48  mls/min/1.73/m2 Final    WBC 02/18/2024 13.66 (H)  4.50 - 11.50 x10(3)/mcL Final    RBC 02/18/2024 3.84 (L)  4.70 - 6.10 x10(6)/mcL Final    Hgb 02/18/2024 10.5 (L)  14.0 - 18.0 g/dL Final    Hct 02/18/2024 33.8 (L)  42.0 - 52.0 % Final    MCV 02/18/2024 88.0  80.0 - 94.0 fL Final    MCH 02/18/2024 27.3  27.0 - 31.0 pg Final    MCHC 02/18/2024 31.1 (L)  33.0 - 36.0 g/dL Final    RDW 02/18/2024 15.9  11.5 - 17.0 % Final    Platelet 02/18/2024 579 (H)  130 - 400 x10(3)/mcL Final    MPV 02/18/2024 8.8  7.4 - 10.4 fL  Final    Neut % 02/18/2024 83.7  % Final    Lymph % 02/18/2024 8.9  % Final    Mono % 02/18/2024 6.4  % Final    Eos % 02/18/2024 0.2  % Final    Basophil % 02/18/2024 0.2  % Final    Lymph # 02/18/2024 1.21  0.6 - 4.6 x10(3)/mcL Final    Neut # 02/18/2024 11.44 (H)  2.1 - 9.2 x10(3)/mcL Final    Mono # 02/18/2024 0.87  0.1 - 1.3 x10(3)/mcL Final    Eos # 02/18/2024 0.03  0 - 0.9 x10(3)/mcL Final    Baso # 02/18/2024 0.03  <=0.2 x10(3)/mcL Final    IG# 02/18/2024 0.08 (H)  0 - 0.04 x10(3)/mcL Final    IG% 02/18/2024 0.6  % Final       Labs Reviewed   URINALYSIS, REFLEX TO URINE CULTURE - Abnormal; Notable for the following components:       Result Value    Color, UA Red (*)     Appearance, UA Cloudy (*)     Protein, UA 3+ (*)     Glucose, UA Trace (*)     Blood, UA 3+ (*)     Bilirubin, UA 1+ (*)     Nitrites, UA Positive (*)     All other components within normal limits   URINALYSIS, MICROSCOPIC - Abnormal; Notable for the following components:    RBC, UA >100 (*)     WBC, UA 11-20 (*)     Squamous Epithelial Cells, UA Few (*)     All other components within normal limits   COMPREHENSIVE METABOLIC PANEL - Abnormal; Notable for the following components:    Sodium Level 134 (*)     Carbon Dioxide 20 (*)     Glucose Level 154 (*)     Creatinine 1.47 (*)     Protein Total 8.2 (*)     Albumin Level 2.4 (*)     Globulin 5.8 (*)     Albumin/Globulin Ratio 0.4 (*)     Aspartate Aminotransferase 51 (*)     All other components within normal limits   CBC WITH DIFFERENTIAL - Abnormal; Notable for the following components:    WBC 13.66 (*)     RBC 3.84 (*)     Hgb 10.5 (*)     Hct 33.8 (*)     MCHC 31.1 (*)     Platelet 579 (*)     Neut # 11.44 (*)     IG# 0.08 (*)     All other components within normal limits   CULTURE, URINE   BLOOD CULTURE [facility]   BLOOD CULTURE [facility]   CBC W/ AUTO DIFFERENTIAL    Narrative:     The following orders were created for panel order CBC auto differential.  Procedure                                Abnormality         Status                     ---------                               -----------         ------                     CBC with Differential[[id number]]       Abnormal            Final result                 Please view results for these tests on the individual orders.          Imaging Results    None          Medications   0.9%  NaCl infusion (1,000 mLs Intravenous New Bag 2/18/24 0051)   cefTRIAXone injection 1 g (1 g Intravenous Given 2/18/24 0132)     Medical Decision Making  83-year-old white male presents with urinary frequency and hematuria.  Differential included urinary tract infection, prostatitis, hemorrhagic cystitis, pyelonephritis, sepsis.  Workup included urine which shows a significant urinary tract infection with hematuria and his chemistry panel shows a mild elevation in his BUN creatinine this is with dehydration he does have a elevated white count.  He was tachycardic upon checking so he was given a L of fluids in his 1st antibiotic given was Rocephin intravenously.  Culture has been sent for his urine and will send 7 days of Macrobid to be taken twice a day.  Once patient got a L of fluids he has no longer tachycardic    Problems Addressed:  Dehydration: acute illness or injury  Dysuria: acute illness or injury  Urinary tract infection with hematuria, site unspecified: acute illness or injury    Amount and/or Complexity of Data Reviewed  Independent Historian: spouse  External Data Reviewed: labs and notes.  Labs: ordered. Decision-making details documented in ED Course.    Risk  OTC drugs.  Prescription drug management.  Drug therapy requiring intensive monitoring for toxicity.                                      Clinical Impression:  Final diagnoses:  [N39.0, R31.9] Urinary tract infection with hematuria, site unspecified (Primary)  [R30.0] Dysuria  [E86.0] Dehydration          ED Disposition Condition    Discharge Stable          ED Prescriptions       Medication Sig Dispense  Start Date End Date Auth. Provider    nitrofurantoin, macrocrystal-monohydrate, (MACROBID) 100 MG capsule Take 1 capsule (100 mg total) by mouth 2 (two) times daily. for 7 days 14 capsule 2/18/2024 2/25/2024 Gordon Velez MD          Follow-up Information       Follow up With Specialties Details Why Contact Info    Kaylyn Hollis, DO Family Medicine In 3 days  [address] [address]  [phone]               Gordon Velez MD  02/18/24 2302

## 2024-02-20 LAB — BACTERIA UR CULT: NORMAL

## 2024-02-21 ENCOUNTER — TELEPHONE (OUTPATIENT)
Dept: FAMILY MEDICINE | Facility: CLINIC | Age: 84
End: 2024-02-21
Payer: MEDICARE

## 2024-02-21 NOTE — TELEPHONE ENCOUNTER
----- Message from Alma Rubin sent at 2/21/2024 12:06 PM CST -----  .Type:  Patient Returning Call    Who Called:Dennis with [facility]  Who Left Message for Patient:Dennis  Does the patient know what this is regarding?:Call back  Would the patient rather a call back or a response via [facility]?   Best Call Back Number:[phone]  Additional Information: Please call back possible reaction to medication

## 2024-02-21 NOTE — TELEPHONE ENCOUNTER
HH nurse called to notify patient went to ED for UTI on 2/18 was prescribed Macrobid. Patient took 2 doses Sunday, 2 doses Monday, and one dose Tuesday. Last night prior to taking second dose of the day, he and his wife noticed he had dark spots all over his body mainly on his legs, feet, torso, and abdomen. Denies itching but stated some are raised. No fever, no SOB. HH nurse advise to stop Macrobid until we consulted with PCP with how to proceed. Please advise.

## 2024-02-21 NOTE — TELEPHONE ENCOUNTER
Can stop Macrobid to see if rash resolves.     If patient continues to have UTI symptoms, he will need to be retested since we are stopping Macrobid.

## 2024-02-22 ENCOUNTER — HOSPITAL ENCOUNTER (INPATIENT)
Facility: HOSPITAL | Age: 84
LOS: 2 days | Discharge: HOME HEALTH | DRG: 690 | End: 2024-02-24
Attending: INTERNAL MEDICINE | Admitting: INTERNAL MEDICINE
Payer: MEDICARE

## 2024-02-22 DIAGNOSIS — D69.2 PURPURA: ICD-10-CM

## 2024-02-22 DIAGNOSIS — E86.0 DEHYDRATION: ICD-10-CM

## 2024-02-22 DIAGNOSIS — N30.01 ACUTE CYSTITIS WITH HEMATURIA: Primary | ICD-10-CM

## 2024-02-22 LAB
ALBUMIN SERPL-MCNC: 1.9 G/DL (ref 3.4–4.8)
ALBUMIN/GLOB SERPL: 0.5 RATIO (ref 1.1–2)
ALP SERPL-CCNC: 47 UNIT/L (ref 40–150)
ALT SERPL-CCNC: 29 UNIT/L (ref 0–55)
APPEARANCE UR: ABNORMAL
APTT PPP: 24 SECONDS (ref 24.6–37.2)
AST SERPL-CCNC: 29 UNIT/L (ref 5–34)
BACTERIA #/AREA URNS AUTO: ABNORMAL /HPF
BASOPHILS # BLD AUTO: 0.03 X10(3)/MCL
BASOPHILS NFR BLD AUTO: 0.2 %
BILIRUB SERPL-MCNC: 0.5 MG/DL
BILIRUB UR QL STRIP.AUTO: NEGATIVE
BUN SERPL-MCNC: 30 MG/DL (ref 8.4–25.7)
CALCIUM SERPL-MCNC: 7.8 MG/DL (ref 8.8–10)
CHLORIDE SERPL-SCNC: 99 MMOL/L (ref 98–107)
CO2 SERPL-SCNC: 19 MMOL/L (ref 23–31)
COLOR UR AUTO: YELLOW
CREAT SERPL-MCNC: 1.6 MG/DL (ref 0.73–1.18)
EOSINOPHIL # BLD AUTO: 0.01 X10(3)/MCL (ref 0–0.9)
EOSINOPHIL NFR BLD AUTO: 0.1 %
ERYTHROCYTE [DISTWIDTH] IN BLOOD BY AUTOMATED COUNT: 15.8 % (ref 11.5–17)
GFR SERPLBLD CREATININE-BSD FMLA CKD-EPI: 43 MLS/MIN/1.73/M2
GLOBULIN SER-MCNC: 4 GM/DL (ref 2.4–3.5)
GLUCOSE SERPL-MCNC: 173 MG/DL (ref 82–115)
GLUCOSE UR QL STRIP.AUTO: NEGATIVE
HCT VFR BLD AUTO: 30.4 % (ref 42–52)
HGB BLD-MCNC: 9.6 G/DL (ref 14–18)
IMM GRANULOCYTES # BLD AUTO: 0.09 X10(3)/MCL (ref 0–0.04)
IMM GRANULOCYTES NFR BLD AUTO: 0.7 %
INR PPP: 1.1
KETONES UR QL STRIP.AUTO: NEGATIVE
LACTATE SERPL-SCNC: 1.3 MMOL/L (ref 0.5–2.2)
LACTATE SERPL-SCNC: 3.7 MMOL/L (ref 0.5–2.2)
LEUKOCYTE ESTERASE UR QL STRIP.AUTO: ABNORMAL
LYMPHOCYTES # BLD AUTO: 0.59 X10(3)/MCL (ref 0.6–4.6)
LYMPHOCYTES NFR BLD AUTO: 4.3 %
MCH RBC QN AUTO: 27.6 PG (ref 27–31)
MCHC RBC AUTO-ENTMCNC: 31.6 G/DL (ref 33–36)
MCV RBC AUTO: 87.4 FL (ref 80–94)
MONOCYTES # BLD AUTO: 0.47 X10(3)/MCL (ref 0.1–1.3)
MONOCYTES NFR BLD AUTO: 3.4 %
NEUTROPHILS # BLD AUTO: 12.54 X10(3)/MCL (ref 2.1–9.2)
NEUTROPHILS NFR BLD AUTO: 91.3 %
NITRITE UR QL STRIP.AUTO: NEGATIVE
PH UR STRIP.AUTO: 6.5 [PH]
PLATELET # BLD AUTO: 428 X10(3)/MCL (ref 130–400)
PMV BLD AUTO: 9.4 FL (ref 7.4–10.4)
POTASSIUM SERPL-SCNC: 4.5 MMOL/L (ref 3.5–5.1)
PROT SERPL-MCNC: 5.9 GM/DL (ref 5.8–7.6)
PROT UR QL STRIP.AUTO: ABNORMAL
PROTHROMBIN TIME: 14.9 SECONDS (ref 12.5–14.5)
RBC # BLD AUTO: 3.48 X10(6)/MCL (ref 4.7–6.1)
RBC #/AREA URNS AUTO: ABNORMAL /HPF
RBC UR QL AUTO: ABNORMAL
SODIUM SERPL-SCNC: 128 MMOL/L (ref 136–145)
SP GR UR STRIP.AUTO: 1.01 (ref 1–1.03)
SQUAMOUS #/AREA URNS AUTO: ABNORMAL /HPF
UROBILINOGEN UR STRIP-ACNC: 0.2
WBC # SPEC AUTO: 13.73 X10(3)/MCL (ref 4.5–11.5)
WBC #/AREA URNS AUTO: ABNORMAL /HPF

## 2024-02-22 PROCEDURE — 94761 N-INVAS EAR/PLS OXIMETRY MLT: CPT

## 2024-02-22 PROCEDURE — 96365 THER/PROPH/DIAG IV INF INIT: CPT

## 2024-02-22 PROCEDURE — 85730 THROMBOPLASTIN TIME PARTIAL: CPT | Performed by: INTERNAL MEDICINE

## 2024-02-22 PROCEDURE — 25000003 PHARM REV CODE 250: Performed by: INTERNAL MEDICINE

## 2024-02-22 PROCEDURE — 87040 BLOOD CULTURE FOR BACTERIA: CPT | Performed by: INTERNAL MEDICINE

## 2024-02-22 PROCEDURE — 80053 COMPREHEN METABOLIC PANEL: CPT | Performed by: INTERNAL MEDICINE

## 2024-02-22 PROCEDURE — 85025 COMPLETE CBC W/AUTO DIFF WBC: CPT | Performed by: INTERNAL MEDICINE

## 2024-02-22 PROCEDURE — 85610 PROTHROMBIN TIME: CPT | Performed by: INTERNAL MEDICINE

## 2024-02-22 PROCEDURE — 99285 EMERGENCY DEPT VISIT HI MDM: CPT | Mod: 25

## 2024-02-22 PROCEDURE — 11000001 HC ACUTE MED/SURG PRIVATE ROOM

## 2024-02-22 PROCEDURE — 83605 ASSAY OF LACTIC ACID: CPT | Performed by: INTERNAL MEDICINE

## 2024-02-22 PROCEDURE — 81001 URINALYSIS AUTO W/SCOPE: CPT | Performed by: INTERNAL MEDICINE

## 2024-02-22 PROCEDURE — 63600175 PHARM REV CODE 636 W HCPCS: Performed by: INTERNAL MEDICINE

## 2024-02-22 PROCEDURE — 96361 HYDRATE IV INFUSION ADD-ON: CPT

## 2024-02-22 RX ORDER — ASPIRIN 325 MG
50000 TABLET, DELAYED RELEASE (ENTERIC COATED) ORAL
COMMUNITY
Start: 2024-02-07 | End: 2024-04-22

## 2024-02-22 RX ORDER — TALC
6 POWDER (GRAM) TOPICAL NIGHTLY PRN
Status: DISCONTINUED | OUTPATIENT
Start: 2024-02-22 | End: 2024-02-24 | Stop reason: HOSPADM

## 2024-02-22 RX ORDER — AMIODARONE HYDROCHLORIDE 200 MG/1
200 TABLET ORAL DAILY
Status: DISCONTINUED | OUTPATIENT
Start: 2024-02-22 | End: 2024-02-24 | Stop reason: HOSPADM

## 2024-02-22 RX ORDER — ASPIRIN 81 MG/1
81 TABLET ORAL DAILY
Status: DISCONTINUED | OUTPATIENT
Start: 2024-02-22 | End: 2024-02-24 | Stop reason: HOSPADM

## 2024-02-22 RX ORDER — SODIUM CHLORIDE 9 MG/ML
INJECTION, SOLUTION INTRAVENOUS CONTINUOUS
Status: DISCONTINUED | OUTPATIENT
Start: 2024-02-22 | End: 2024-02-24 | Stop reason: HOSPADM

## 2024-02-22 RX ORDER — BISMUTH SUBSALICYLATE 525 MG/30ML
30 LIQUID ORAL EVERY 4 HOURS PRN
Status: DISCONTINUED | OUTPATIENT
Start: 2024-02-22 | End: 2024-02-24 | Stop reason: HOSPADM

## 2024-02-22 RX ORDER — ATORVASTATIN CALCIUM 40 MG/1
40 TABLET, FILM COATED ORAL DAILY
Status: DISCONTINUED | OUTPATIENT
Start: 2024-02-22 | End: 2024-02-24 | Stop reason: HOSPADM

## 2024-02-22 RX ORDER — SODIUM CHLORIDE 0.9 % (FLUSH) 0.9 %
10 SYRINGE (ML) INJECTION
Status: DISCONTINUED | OUTPATIENT
Start: 2024-02-22 | End: 2024-02-24 | Stop reason: HOSPADM

## 2024-02-22 RX ADMIN — ATORVASTATIN CALCIUM 40 MG: 40 TABLET, FILM COATED ORAL at 12:02

## 2024-02-22 RX ADMIN — SODIUM CHLORIDE: 9 INJECTION, SOLUTION INTRAVENOUS at 09:02

## 2024-02-22 RX ADMIN — SODIUM CHLORIDE 1000 ML: 9 INJECTION, SOLUTION INTRAVENOUS at 07:02

## 2024-02-22 RX ADMIN — AMIODARONE HYDROCHLORIDE 200 MG: 200 TABLET ORAL at 12:02

## 2024-02-22 RX ADMIN — BISMUTH SUBSALICYLATE 30 ML: 525 LIQUID ORAL at 08:02

## 2024-02-22 RX ADMIN — CEFTRIAXONE SODIUM 1 G: 1 INJECTION, POWDER, FOR SOLUTION INTRAMUSCULAR; INTRAVENOUS at 09:02

## 2024-02-22 RX ADMIN — ASPIRIN 81 MG: 81 TABLET, COATED ORAL at 12:02

## 2024-02-22 NOTE — H&P
[facility] Medicine History & Physical Examination       Patient Name: Garrison Garrett  MRN: [id number]  Patient Class: IP- Inpatient   Admission Date: 2/22/2024   Admitting Physician: BHAVIN Service   Length of Stay: 0  Attending Physician: Unique Antonio MD  Primary Care Provider: Kaylyn Hollis DO  Face-to-Face encounter date: 02/22/2024  Code Status:  Chief Complaint: Fatigue (C/o feeling weak around 3am, EMS reports hypotension and a-fib.)        Patient information was obtained from patient, patient's family, past medical records and ER records.  ED records were reviewed in detail and documented below    HISTORY OF PRESENT ILLNESS:   Garrison Garrett is a 83 y.o. male who  has a past medical history of Anticoagulant long-term use, Arthritis, CAD (coronary artery disease), CHF (congestive heart failure), HLD (hyperlipidemia), [place] (hard of hearing), HTN (hypertension), and Paroxysmal atrial fibrillation..     83 years old male history of CHF and AFib presented to emergency room complain of painful frequent urination for 6 days     No fever, no shortness for breath, no cough, no nausea     The patient came to our emergency room 4 days ago, diagnosed with a UTI and discharged with Macrobid     The patient developed skin rash, nausea vomiting and diarrhea, painful frequent urination did not improve     At emergency room, WBC 87703 hemoglobin 9.6, sodium 128, BUN 30, creatinine 1.6, baseline creatinine 1.2, lactic acid 3.7     No imaging study was done     Patient was admitted to the hospital for acute UTI and acute on chronic renal failure  PAST MEDICAL HISTORY:     Past Medical History:   Diagnosis Date    Anticoagulant long-term use     Arthritis     CAD (coronary artery disease)     CHF (congestive heart failure)     HLD (hyperlipidemia)     [place] (hard of hearing)     HTN (hypertension)     Paroxysmal atrial fibrillation        PAST SURGICAL HISTORY:     Past Surgical  History:   Procedure Laterality Date    CARDIOVERSION  04/12/2021    [facility]    CATARACT EXTRACTION Bilateral     CLOSURE OF LEFT ATRIAL APPENDAGE USING DEVICE N/A 01/24/2023    Procedure: CLOSURE, LEFT ATRIAL APPENDAGE, USING DEVICE;  Surgeon: Joce Layne MD;  [place] [facility] [place] [facility];  Service: Cardiology;  Laterality: N/A;  AMULET W/ INTRA OP [facility]    ECHOCARDIOGRAM,TRANSESOPHAGEAL N/A 01/24/2023    Procedure: ECHOCARDIOGRAM,TRANSESOPHAGEAL;  Surgeon: Chu Diagnostic Provider;  [place] [facility] [place] [facility];  Service: Cardiology;  Laterality: N/A;    EYE SURGERY  2000    [facility]    MULTIPLE TOOTH EXTRACTIONS      SINUS SURGERY         ALLERGIES:   Doxazosin, Entresto [sacubitril-valsartan], and Hydrochlorothiazide    FAMILY HISTORY:   Reviewed and negative    SOCIAL HISTORY:     Social History     Tobacco Use    Smoking status: Former     Types: Cigarettes     Passive exposure: Never    Smokeless tobacco: Never    Tobacco comments:     Quit 30 - 35 years ago   Substance Use Topics    Alcohol use: Yes     Alcohol/week: 6.0 standard drinks of alcohol     Types: 6 Cans of beer per week     Comment: Daily        HOME MEDICATIONS:     Prior to Admission medications    Medication Sig Start Date End Date Taking? Authorizing Provider   amiodarone (PACERONE) 200 MG Tab Take 1 tablet (200 mg total) by mouth once daily. 1/25/23  Yes Brooklyn Duenas FNP   aspirin (ECOTRIN) 81 MG EC tablet Take 1 tablet (81 mg total) by mouth once daily. 1/25/23 2/22/24 Yes Brooklyn Duenas FNP   atorvastatin (LIPITOR) 40 MG tablet Take 40 mg by mouth once daily. 9/24/22  Yes Provider, Historical   cholecalciferol, vitamin D3, 1,250 mcg (50,000 unit) capsule Take 50,000 Units by mouth every 7 days. 2/7/24  Yes Provider, Historical   benzonatate (TESSALON) 100 MG capsule Take 1 capsule (100 mg total) by mouth 3 (three) times daily as needed for Cough. 2/6/24   Kaylyn Hollis, DO   furosemide (LASIX) 40 MG tablet Take 40 mg by mouth every Mon, Wed,  Fri. 4/22/22   Provider, Historical   lisinopriL (PRINIVIL,ZESTRIL) 20 MG tablet Take 20 mg by mouth. 11/28/23   Provider, Historical   nitrofurantoin, macrocrystal-monohydrate, (MACROBID) 100 MG capsule Take 1 capsule (100 mg total) by mouth 2 (two) times daily. for 7 days 2/18/24 2/25/24  Gordon Velez MD   sacubitriL-valsartan (ENTRESTO) 24-26 mg per tablet Take 2 tablets by mouth once daily. Patient takes one in the morning and one at night    Provider, Historical   valsartan (DIOVAN) 40 MG tablet Take 40 mg by mouth 2 (two) times daily. 12/10/23   Provider, Historical       REVIEW OF SYSTEMS:   Except as documented, all other systems reviewed and negative     PHYSICAL EXAM:     VITAL SIGNS: 24 HRS MIN & MAX LAST   Temp  Min: 97.9 °F (36.6 °C)  Max: 98.3 °F (36.8 °C) 98.3 °F (36.8 °C)   BP  Min: 94/60  Max: 124/73 118/67   Pulse  Min: 85  Max: 108  94   Resp  Min: 18  Max: 32 (!) 22   SpO2  Min: 94 %  Max: 98 % (!) 94 %     General appearance: Well-developed, well-nourished male in no apparent distress.  HENT: Atraumatic head. Moist mucous membranes of oral cavity.  Eyes: Normal extraocular movements.   Neck: Supple.   Lungs: Clear to auscultation bilaterally. No wheezing present.   Heart: Regular rate and rhythm. S1 and S2 present with no murmurs/gallop/rub. No pedal edema. No JVD present.   Abdomen: Soft, non-distended, non-tender. No rebound tenderness/guarding. Bowel sounds are normal.   Extremities: No cyanosis, clubbing, or edema.  Skin: No Rash.   Neuro: Motor and sensory exams grossly intact. Good tone. Muscle strength 5/5 in all 4 extremities  Psych/mental status: Appropriate mood and affect. Responds appropriately to questions.     LABS AND IMAGING:     Recent Labs   Lab 02/18/24  0039 02/22/24  0810   WBC 13.66* 13.73*   RBC 3.84* 3.48*   HGB 10.5* 9.6*   HCT 33.8* 30.4*   MCV 88.0 87.4   MCH 27.3 27.6   MCHC 31.1* 31.6*   RDW 15.9 15.8   * 428*   MPV 8.8 9.4       Recent Labs   Lab  02/18/24  0039 02/22/24 0810   * 128*   K 4.1 4.5   CO2 20* 19*   BUN 22.0 30.0*   CREATININE 1.47* 1.60*   CALCIUM 9.5 7.8*   ALBUMIN 2.4* 1.9*   ALKPHOS 70 47   ALT 47 29   AST 51* 29   BILITOT 0.4 0.5       Microbiology Results (last 7 days)       Procedure Component Value Units Date/Time    Blood culture #2 **CANNOT BE ORDERED STAT** [[id number]] Collected: 02/22/24 0812    Order Status: Sent Specimen: Blood Updated: 02/22/24 0815    Blood culture #1 **CANNOT BE ORDERED STAT** [[id number]] Collected: 02/22/24 0810    Order Status: Sent Specimen: Blood Updated: 02/22/24 0815             CT Hip Without Contrast Left  Narrative: EXAMINATION:  CT HIP WITHOUT CONTRAST LEFT    CLINICAL HISTORY:  Fracture, hip;, Fracture of unspecified part of neck of left femur, subsequent encounter for closed fracture with routine healing.    TECHNIQUE:  PATIENT RADIATION DOSE: DLP(mGycm) 210    As per PQRS measures, all CT scans at this facility used dose modulation, iterative reconstruction, and/or weight based dose adjustment when appropriate to reduce radiation dose to as low as reasonably achievable.    COMPARISON:  None available    FINDINGS:  Serial axial images changes are noted to the left hip without the administration of IV contrast.  Both soft tissue and bone windows were performed.  Additional sagittal and coronal reconstructions were obtained.  There is a slightly displaced oblique mildly comminuted fracture at the left greater trochanter.  No other definite fracture or dislocation is identified.  There is minimal narrowing of the left hip joint.  Bony structures are osteopenic.  Extensive atherosclerosis is identified.  No aggressive osteolytic or osteoblastic lesion is seen.  The bladder is distended with fluid.  Enthesophyte formation is evident at the inferior ischium.  No significant effusion is appreciated.  Impression: 1. Mildly comminuted slightly displaced oblique fracture at the left greater  trochanter  2. Osteopenia  3. Mild osteoarthritis  4. Extensive atherosclerosis  5. MR examination would allow further evaluation if clinically indicated    Electronically signed by: John Dasilva  Date:    01/16/2024  Time:    10:30  X-Ray Hip 2 or 3 views Left (with Pelvis when performed)  Narrative: EXAMINATION:  XR HIP WITH PELVIS WHEN PERFORMED, 2 OR 3 VIEWS LEFT    CLINICAL HISTORY:  Other injury of unspecified body region, initial encounter; .    COMPARISON:  01/09/2024    FINDINGS:  AP and frog-leg lateral views reveala slightly displaced superior greater trochanteric fracture which is similar to the prior exam.  Hip joints appear grossly intact.  Bony structures are osteopenic.  Extensive atherosclerosis is identified.  No aggressive osteolytic or osteoblastic lesion is seen.  Obturator foramen and iliac bones are asymmetric in appearance; likely due to patient positioning/rotation.  Impression: 1. Slightly displaced greater trochanteric fracture which has a similar appearance to the prior exam  2. Osteopenia  3. Extensive atherosclerosis  4. Osteoarthritis    Electronically signed by: John Dasilva  Date:    01/16/2024  Time:    10:21      ASSESSMENT & PLAN:     Acute renal failure secondary to vomiting and diarrhea   Acute UTI-failed outpatient treatment   Skin rash secondary to Macrobid   CAD   Chronic CHF   Chronic atrial fibrillation   Hypertension  Hypercholesterolemia    Continue IV Rocephin  Continue hydration with normal saline 75 cc/hour   Continue amiodarone for AFib  Check blood culture urine culture   Check CBC BMP in a.m.      VTE Prophylaxis: will be placed on Heparin/Lovenox/ Xarelto/ SCD for DVT prophylaxis and will be advised to be as mobile as possible and sit in a chair as tolerated    Patient condition:  Stable/Fair/Guarded/ Serious/ Critical    __________________________________________________________________________  INPATIENT LIST OF MEDICATIONS     Scheduled Meds:   amiodarone  200  mg Oral Daily    aspirin  81 mg Oral Daily    atorvastatin  40 mg Oral Daily    cefTRIAXone (Rocephin) IV (PEDS and ADULTS)  1 g Intravenous Q24H     Continuous Infusions:   sodium chloride 0.9% 75 mL/hr at 02/22/24 0953     PRN Meds:.melatonin, sodium chloride 0.9%      I, _ NP/PA have reviewed and discussed the case with  _   Please see the following addendum for further assessment and plan from there attending MD.    02/22/2024    ________________________________________________________________________________    MD Addendum:  Dr. MARIAM ---assumed care of this patient today at ---am/pm  For the patient encounter, I performed the substantive portion of the visit, I reviewed the NP/PA documentation, treatment plan, and medical decision making.  I had face to face time with this patient     A. History:    B. Physical exam:    C. Medical decision making:    Discharge Planning and Disposition: No mobility needs. Ambulating well. Good social support system.   Anticipated discharge    If patient was admitted under observational status it is with my approval/permission.        All diagnosis and differential diagnosis have been reviewed; assessment and plan has been documented; I have personally reviewed the labs and test results that are presently available; I have reviewed the patients medication list; I have reviewed the consulting providers response and recommendations. I have reviewed or attempted to review medical records based upon their availability.    All of the patient and family questions have been addressed and answered. Patient's is agreeable to the above stated plan. I will continue to monitor closely and make adjustments to medical management as needed.    If patient was admitted under observational status it is with my approval/permission.      Unique Antonio MD   02/22/2024

## 2024-02-22 NOTE — PLAN OF CARE
Problem: Adult Inpatient Plan of Care  Goal: Plan of Care Review  2/22/2024 1054 by Kyle Montiel RN  Outcome: Ongoing, Progressing  2/22/2024 1052 by Kyle Montiel RN  Outcome: Ongoing, Progressing  Goal: Patient-Specific Goal (Individualized)  2/22/2024 1054 by Kyle Montiel RN  Outcome: Ongoing, Progressing  2/22/2024 1052 by Kyle Montiel RN  Outcome: Ongoing, Progressing  Goal: Absence of Hospital-Acquired Illness or Injury  2/22/2024 1054 by Kyle Montiel RN  Outcome: Ongoing, Progressing  2/22/2024 1052 by Kyle Montiel RN  Outcome: Ongoing, Progressing  Goal: Optimal Comfort and Wellbeing  2/22/2024 1054 by Kyle Montiel RN  Outcome: Ongoing, Progressing  2/22/2024 1052 by Kyle Montiel RN  Outcome: Ongoing, Progressing  Goal: Readiness for Transition of Care  2/22/2024 1054 by Kyle Montiel RN  Outcome: Ongoing, Progressing  2/22/2024 1052 by Kyle Montiel RN  Outcome: Ongoing, Progressing     Problem: UTI (Urinary Tract Infection)  Goal: Improved Infection Symptoms  Outcome: Ongoing, Progressing     Problem: Infection  Goal: Absence of Infection Signs and Symptoms  Outcome: Ongoing, Progressing

## 2024-02-22 NOTE — ED PROVIDER NOTES
02/22/2024         7:52 AM    Source of History:  History obtained from patient and wife.     Chief complaint:  From Nurse Triage:  Fatigue (C/o feeling weak around 3am, EMS reports hypotension and a-fib.)    HISTORY OF PRESENT ILLNES:  Garrison Garrett is a 83 y.o. male  has a past medical history of Anticoagulant long-term use, Arthritis, CAD (coronary artery disease), CHF (congestive heart failure), HLD (hyperlipidemia), [place] (hard of hearing), HTN (hypertension), and Paroxysmal atrial fibrillation. presenting with Fatigue (C/o feeling weak around 3am, EMS reports hypotension and a-fib.)  Patient was seen here 4 days ago for urinary frequency.  Diagnosed with UTI and was given Macrobid.  Patient states symptoms are still present with frequent urination but also has developed a rash over his entire body.  Patient denies itching does complain of weakness    REVIEW OF SYSTEMS:   Constitutional symptoms:     Skin symptoms:      Eye symptoms:     ENMT symptoms:      Respiratory symptoms:      Cardiovascular symptoms:     Gastrointestinal symptoms:      Genitourinary symptoms:     Musculoskeletal symptoms:      Neurologic symptoms:      Psychiatric symptoms:               Additional review of systems information: Patient Denies Any Other Complaints.    All Other Systems Reviewed With Patient And Negative.    ALLEGIES:  Review of patient's allergies indicates:   Allergen Reactions    Doxazosin Other (See Comments)     dizziness    Entresto [sacubitril-valsartan]     Hydrochlorothiazide      Other reaction(s): precaution       MEDICINE LIST:  Current Outpatient Medications   Medication Instructions    amiodarone (PACERONE) 200 mg, Oral, Daily    aspirin (ECOTRIN) 81 mg, Oral, Daily    atorvastatin (LIPITOR) 40 mg, Oral, Daily    benzonatate (TESSALON) 100 mg, Oral, 3 times daily PRN    cholecalciferol (vitamin D3) 50,000 Units, Oral, Every 7 days    furosemide (LASIX) 40 mg, Oral, Every Mon, Wed, Fri    lisinopriL  (PRINIVIL,ZESTRIL) 20 mg, Oral    nitrofurantoin, macrocrystal-monohydrate, (MACROBID) 100 MG capsule 100 mg, Oral, 2 times daily    sacubitriL-valsartan (ENTRESTO) 24-26 mg per tablet 2 tablets, Oral, Daily, Patient takes one in the morning and one at night     valsartan (DIOVAN) 40 mg, Oral, 2 times daily        PMH:  As per HPI and below:    Reviewed and updated in chart.    PAST MEDICAL HISTORY:  Past Medical History:   Diagnosis Date    Anticoagulant long-term use     Arthritis     CAD (coronary artery disease)     CHF (congestive heart failure)     HLD (hyperlipidemia)     [place] (hard of hearing)     HTN (hypertension)     Paroxysmal atrial fibrillation         PAST SURGICAL HISTORY:  Past Surgical History:   Procedure Laterality Date    CARDIOVERSION  04/12/2021    [facility]AM    CATARACT EXTRACTION Bilateral     CLOSURE OF LEFT ATRIAL APPENDAGE USING DEVICE N/A 01/24/2023    Procedure: CLOSURE, LEFT ATRIAL APPENDAGE, USING DEVICE;  Surgeon: Joce Layne MD;  Location: [facility] CATH LAB;  Service: Cardiology;  Laterality: N/A;  AMULET W/ INTRA OP WISAM    ECHOCARDIOGRAM,TRANSESOPHAGEAL N/A 01/24/2023    Procedure: ECHOCARDIOGRAM,TRANSESOPHAGEAL;  Surgeon: Chu Diagnostic Provider;  Location: [facility] CATH LAB;  Service: Cardiology;  Laterality: N/A;    EYE SURGERY  2000    [facility]    MULTIPLE TOOTH EXTRACTIONS      SINUS SURGERY         SOCIAL HISTORY:  Social History     Tobacco Use    Smoking status: Former     Types: Cigarettes     Passive exposure: Never    Smokeless tobacco: Never    Tobacco comments:     Quit 30 - 35 years ago   Substance Use Topics    Alcohol use: Yes     Alcohol/week: 6.0 standard drinks of alcohol     Types: 6 Cans of beer per week     Comment: Daily    Drug use: Never       FAMILY HISTORY:  Family History   Problem Relation Age of Onset    Stroke Mother     Heart attack Mother     Hearing loss Mother     Prostate cancer Father     Kidney disease Son     Vision loss Sister         PROBLEM  LIST:  Patient Active Problem List   Diagnosis    Hypertension    Hyponatremia    Mixed hyperlipidemia    Permanent atrial fibrillation    Cataract of both eyes    Chronic kidney disease, stage 2 (mild)    Renal agenesis, unilateral        PHYSICAL EXAM:      ED Triage Vitals [02/22/24 0743]   BP 98/72   Pulse 85   Resp 18   Temp    SpO2 98 %        Vital Signs: Reviewed As In Chart.  General:  Alert, No Cardiorespiratory Distress Noted.  Head: Normocephalic   Eye:  Pupils equal and reactive to light and accomodation. Extraocular Movements Are Intact.   ENT: Mucus membranes are moist.   Neck: Supple  Cardiovascular:  Regular Rate And Rhythm     Respiratory:  Clear to auscultation bilaterally    Gastrointestinal:  Soft, Non Distended, Non Tenderness, Normal Bowel Sounds.    Neurological:  Alert And Oriented To Person, Place, Time, And Situation, Normal Motor Observed, Normal Speech Observed.  Back: Normal range of motion  Musculoskeletal:  No Gross Deformity Noted.   Genital: deferred    Psychiatric:  Cooperative.  Skin: warm, dry. purpuric rash over legs back and arms.  Lymphatic: No lymphadenopathy      ED WORKUP FOR MEDICAL DECISION MAKING:    ED ORDERS:  Orders Placed This Encounter   Procedures    Blood culture #1 **CANNOT BE ORDERED STAT**    Blood culture #2 **CANNOT BE ORDERED STAT**    CBC auto differential    Comprehensive metabolic panel    Urinalysis, Reflex to Urine Culture    Lactic acid, plasma    APTT    Protime-INR    CBC with Differential    Lactic Acid, Plasma    Urinalysis, Microscopic    Insert Saline lock IV    Admit to Inpatient       ED MEDICINES:  Medications   cefTRIAXone (Rocephin) 1 g in dextrose 5 % in water (D5W) 100 mL IVPB (MB+) (has no administration in time range)   sodium chloride 0.9% bolus 1,000 mL 1,000 mL (0 mLs Intravenous Stopped 2/22/24 0856)                ED LABS ORDERED AND REVIEWED:  Admission on 02/22/2024   Component Date Value Ref Range Status    Sodium Level  02/22/2024 128 (L)  136 - 145 mmol/L Final    Potassium Level 02/22/2024 4.5  3.5 - 5.1 mmol/L Final    Chloride 02/22/2024 99  98 - 107 mmol/L Final    Carbon Dioxide 02/22/2024 19 (L)  23 - 31 mmol/L Final    Glucose Level 02/22/2024 173 (H)  82 - 115 mg/dL Final    Blood Urea Nitrogen 02/22/2024 30.0 (H)  8.4 - 25.7 mg/dL Final    Creatinine 02/22/2024 1.60 (H)  0.73 - 1.18 mg/dL Final    Calcium Level Total 02/22/2024 7.8 (L)  8.8 - 10.0 mg/dL Final    Protein Total 02/22/2024 5.9  5.8 - 7.6 gm/dL Final    Albumin Level 02/22/2024 1.9 (L)  3.4 - 4.8 g/dL Final    Globulin 02/22/2024 4.0 (H)  2.4 - 3.5 gm/dL Final    Albumin/Globulin Ratio 02/22/2024 0.5 (L)  1.1 - 2.0 ratio Final    Bilirubin Total 02/22/2024 0.5  <=1.5 mg/dL Final    Alkaline Phosphatase 02/22/2024 47  40 - 150 unit/L Final    Alanine Aminotransferase 02/22/2024 29  0 - 55 unit/L Final    Aspartate Aminotransferase 02/22/2024 29  5 - 34 unit/L Final    eGFR 02/22/2024 43  mls/min/1.73/m2 Final    Color, UA 02/22/2024 Yellow  Yellow, Light-Yellow, Dark Yellow, Ana, Straw Final    Appearance, UA 02/22/2024 Slightly Cloudy (A)  Clear Final    Specific Gravity, UA 02/22/2024 1.010  1.005 - 1.030 Final    pH, UA 02/22/2024 6.5  5.0 - 8.5 Final    Protein, UA 02/22/2024 2+ (A)  Negative Final    Glucose, UA 02/22/2024 Negative  Negative, Normal Final    Ketones, UA 02/22/2024 Negative  Negative Final    Blood, UA 02/22/2024 2+ (A)  Negative Final    Bilirubin, UA 02/22/2024 Negative  Negative Final    Urobilinogen, UA 02/22/2024 0.2  0.2, 1.0, Normal Final    Nitrites, UA 02/22/2024 Negative  Negative Final    Leukocyte Esterase, UA 02/22/2024 1+ (A)  Negative Final    Lactic Acid Level 02/22/2024 3.7 (HH)  0.5 - 2.2 mmol/L Final    PTT 02/22/2024 24.0 (L)  24.6 - 37.2 seconds Final    PT 02/22/2024 14.9 (H)  12.5 - 14.5 seconds Final    INR 02/22/2024 1.1  <=1.3 Final    WBC 02/22/2024 13.73 (H)  4.50 - 11.50 x10(3)/mcL Final    RBC 02/22/2024  3.48 (L)  4.70 - 6.10 x10(6)/mcL Final    Hgb 02/22/2024 9.6 (L)  14.0 - 18.0 g/dL Final    Hct 02/22/2024 30.4 (L)  42.0 - 52.0 % Final    MCV 02/22/2024 87.4  80.0 - 94.0 fL Final    MCH 02/22/2024 27.6  27.0 - 31.0 pg Final    MCHC 02/22/2024 31.6 (L)  33.0 - 36.0 g/dL Final    RDW 02/22/2024 15.8  11.5 - 17.0 % Final    Platelet 02/22/2024 428 (H)  130 - 400 x10(3)/mcL Final    MPV 02/22/2024 9.4  7.4 - 10.4 fL Final    Neut % 02/22/2024 91.3  % Final    Lymph % 02/22/2024 4.3  % Final    Mono % 02/22/2024 3.4  % Final    Eos % 02/22/2024 0.1  % Final    Basophil % 02/22/2024 0.2  % Final    Lymph # 02/22/2024 0.59 (L)  0.6 - 4.6 x10(3)/mcL Final    Neut # 02/22/2024 12.54 (H)  2.1 - 9.2 x10(3)/mcL Final    Mono # 02/22/2024 0.47  0.1 - 1.3 x10(3)/mcL Final    Eos # 02/22/2024 0.01  0 - 0.9 x10(3)/mcL Final    Baso # 02/22/2024 0.03  <=0.2 x10(3)/mcL Final    IG# 02/22/2024 0.09 (H)  0 - 0.04 x10(3)/mcL Final    IG% 02/22/2024 0.7  % Final    Bacteria, UA 02/22/2024 Few (A)  None Seen, Rare, Occasional /HPF Final    RBC, UA 02/22/2024 11-20 (A)  None Seen, 0-2, 3-5, 0-5 /HPF Final    WBC, UA 02/22/2024 6-10 (A)  None Seen, 0-2, 3-5, 0-5 /HPF Final    Squamous Epithelial Cells, UA 02/22/2024 Rare  None Seen, Rare, Occasional, Occ /HPF Final       RADIOLOGY STUDIES ORDERED AND REVIEWED:  Imaging Results    None         MEDICAL DECISION MAKING:    Garrison Garrett is 83 y.o. male who  has a past medical history of Anticoagulant long-term use, Arthritis, CAD (coronary artery disease), CHF (congestive heart failure), HLD (hyperlipidemia), [place] (hard of hearing), HTN (hypertension), and Paroxysmal atrial fibrillation. arrives in ER with c/o Fatigue (C/o feeling weak around 3am, EMS reports hypotension and a-fib.)      Reviewed Nurses Note. Reviewed Vital Signs.     Reviewed Pertinent old records, History and updated as necessary.    Vitals:    02/22/24 0903   BP: 116/75   Pulse: 104   Resp: (!) 32        Medical  Decision Making  Differential diagnosis includes but not limited to weakness, dizziness, anemia, hypotension, dehydration, hypoglycemia, viral syndrome, influenza, pneumonia, sepsis, UTI, electrolyte imbalance, heat exhaustion, deconditioning, hypothyroidism, GI bleed, CVA and TIA.    Amount and/or Complexity of Data Reviewed  Labs: ordered.     Details: Sodium low, urine WBCs and RBCs and bacteria, elevated BUN and creatinine  Discussion of management or test interpretation with external provider(s): Discussed with Dr. Antonio who accepts admit.                        PROCEDURES PERFORMED IN ED:  Procedures    DIAGNOSTIC IMPRESSION:        ICD-10-CM ICD-9-CM   1. Acute cystitis with hematuria  N30.01 595.0   2. Purpura  D69.2 287.2   3. Dehydration  E86.0 276.51         ED Disposition Condition    Admit Stable               Medication List        ASK your doctor about these medications      amiodarone 200 MG Tab  Commonly known as: PACERONE  Take 1 tablet (200 mg total) by mouth once daily.     aspirin 81 MG EC tablet  Commonly known as: ECOTRIN  Take 1 tablet (81 mg total) by mouth once daily.     atorvastatin 40 MG tablet  Commonly known as: LIPITOR     benzonatate 100 MG capsule  Commonly known as: TESSALON  Take 1 capsule (100 mg total) by mouth 3 (three) times daily as needed for Cough.     cholecalciferol (vitamin D3) 1,250 mcg (50,000 unit) capsule     ENTRESTO 24-26 mg per tablet  Generic drug: sacubitriL-valsartan     furosemide 40 MG tablet  Commonly known as: LASIX     lisinopriL 20 MG tablet  Commonly known as: PRINIVIL,ZESTRIL     nitrofurantoin (macrocrystal-monohydrate) 100 MG capsule  Commonly known as: MACROBID  Take 1 capsule (100 mg total) by mouth 2 (two) times daily. for 7 days     valsartan 40 MG tablet  Commonly known as: Maycol Burch MD  02/22/24 0913

## 2024-02-23 LAB
ALBUMIN SERPL-MCNC: 1.8 G/DL (ref 3.4–4.8)
ALBUMIN/GLOB SERPL: 0.5 RATIO (ref 1.1–2)
ALP SERPL-CCNC: 40 UNIT/L (ref 40–150)
ALT SERPL-CCNC: 23 UNIT/L (ref 0–55)
AST SERPL-CCNC: 24 UNIT/L (ref 5–34)
BACTERIA BLD CULT: NORMAL
BACTERIA BLD CULT: NORMAL
BASOPHILS # BLD AUTO: 0.03 X10(3)/MCL
BASOPHILS NFR BLD AUTO: 0.4 %
BILIRUB SERPL-MCNC: 0.3 MG/DL
BUN SERPL-MCNC: 27 MG/DL (ref 8.4–25.7)
CALCIUM SERPL-MCNC: 8 MG/DL (ref 8.8–10)
CHLORIDE SERPL-SCNC: 104 MMOL/L (ref 98–107)
CO2 SERPL-SCNC: 19 MMOL/L (ref 23–31)
CREAT SERPL-MCNC: 1.29 MG/DL (ref 0.73–1.18)
EOSINOPHIL # BLD AUTO: 0.1 X10(3)/MCL (ref 0–0.9)
EOSINOPHIL NFR BLD AUTO: 1.3 %
ERYTHROCYTE [DISTWIDTH] IN BLOOD BY AUTOMATED COUNT: 15.9 % (ref 11.5–17)
GFR SERPLBLD CREATININE-BSD FMLA CKD-EPI: 56 MLS/MIN/1.73/M2
GLOBULIN SER-MCNC: 3.6 GM/DL (ref 2.4–3.5)
GLUCOSE SERPL-MCNC: 92 MG/DL (ref 82–115)
HCT VFR BLD AUTO: 26.2 % (ref 42–52)
HGB BLD-MCNC: 8.1 G/DL (ref 14–18)
IMM GRANULOCYTES # BLD AUTO: 0.05 X10(3)/MCL (ref 0–0.04)
IMM GRANULOCYTES NFR BLD AUTO: 0.7 %
LYMPHOCYTES # BLD AUTO: 1.01 X10(3)/MCL (ref 0.6–4.6)
LYMPHOCYTES NFR BLD AUTO: 13.2 %
MCH RBC QN AUTO: 27.3 PG (ref 27–31)
MCHC RBC AUTO-ENTMCNC: 30.9 G/DL (ref 33–36)
MCV RBC AUTO: 88.2 FL (ref 80–94)
MONOCYTES # BLD AUTO: 0.44 X10(3)/MCL (ref 0.1–1.3)
MONOCYTES NFR BLD AUTO: 5.7 %
NEUTROPHILS # BLD AUTO: 6.03 X10(3)/MCL (ref 2.1–9.2)
NEUTROPHILS NFR BLD AUTO: 78.7 %
PLATELET # BLD AUTO: 369 X10(3)/MCL (ref 130–400)
PMV BLD AUTO: 9.5 FL (ref 7.4–10.4)
POTASSIUM SERPL-SCNC: 3.4 MMOL/L (ref 3.5–5.1)
PROT SERPL-MCNC: 5.4 GM/DL (ref 5.8–7.6)
RBC # BLD AUTO: 2.97 X10(6)/MCL (ref 4.7–6.1)
SODIUM SERPL-SCNC: 130 MMOL/L (ref 136–145)
WBC # SPEC AUTO: 7.66 X10(3)/MCL (ref 4.5–11.5)

## 2024-02-23 PROCEDURE — 63600175 PHARM REV CODE 636 W HCPCS: Performed by: INTERNAL MEDICINE

## 2024-02-23 PROCEDURE — 97116 GAIT TRAINING THERAPY: CPT

## 2024-02-23 PROCEDURE — 80053 COMPREHEN METABOLIC PANEL: CPT | Performed by: INTERNAL MEDICINE

## 2024-02-23 PROCEDURE — 97161 PT EVAL LOW COMPLEX 20 MIN: CPT

## 2024-02-23 PROCEDURE — 94761 N-INVAS EAR/PLS OXIMETRY MLT: CPT

## 2024-02-23 PROCEDURE — 11000001 HC ACUTE MED/SURG PRIVATE ROOM

## 2024-02-23 PROCEDURE — 25000003 PHARM REV CODE 250: Performed by: INTERNAL MEDICINE

## 2024-02-23 PROCEDURE — 85025 COMPLETE CBC W/AUTO DIFF WBC: CPT | Performed by: INTERNAL MEDICINE

## 2024-02-23 RX ORDER — ONDANSETRON HYDROCHLORIDE 2 MG/ML
4 INJECTION, SOLUTION INTRAVENOUS EVERY 6 HOURS PRN
Status: DISCONTINUED | OUTPATIENT
Start: 2024-02-23 | End: 2024-02-24 | Stop reason: HOSPADM

## 2024-02-23 RX ORDER — ONDANSETRON HYDROCHLORIDE 2 MG/ML
4 INJECTION, SOLUTION INTRAVENOUS ONCE
Status: DISCONTINUED | OUTPATIENT
Start: 2024-02-24 | End: 2024-02-24 | Stop reason: HOSPADM

## 2024-02-23 RX ADMIN — ONDANSETRON 4 MG: 2 INJECTION INTRAMUSCULAR; INTRAVENOUS at 01:02

## 2024-02-23 RX ADMIN — SODIUM CHLORIDE: 9 INJECTION, SOLUTION INTRAVENOUS at 01:02

## 2024-02-23 RX ADMIN — ONDANSETRON 4 MG: 2 INJECTION INTRAMUSCULAR; INTRAVENOUS at 11:02

## 2024-02-23 RX ADMIN — AMIODARONE HYDROCHLORIDE 200 MG: 200 TABLET ORAL at 09:02

## 2024-02-23 RX ADMIN — BISMUTH SUBSALICYLATE 30 ML: 525 LIQUID ORAL at 04:02

## 2024-02-23 RX ADMIN — CEFTRIAXONE SODIUM 1 G: 1 INJECTION, POWDER, FOR SOLUTION INTRAMUSCULAR; INTRAVENOUS at 09:02

## 2024-02-23 RX ADMIN — ATORVASTATIN CALCIUM 40 MG: 40 TABLET, FILM COATED ORAL at 09:02

## 2024-02-23 RX ADMIN — ONDANSETRON 4 MG: 2 INJECTION INTRAMUSCULAR; INTRAVENOUS at 06:02

## 2024-02-23 RX ADMIN — ASPIRIN 81 MG: 81 TABLET, COATED ORAL at 09:02

## 2024-02-23 NOTE — PLAN OF CARE
02/23/24 1125   Discharge Assessment   Assessment Type Discharge Planning Assessment   Source of Information patient   Do you expect to return to your current living situation? Yes   Do you have help at home or someone to help you manage your care at home? Yes   Who are your caregiver(s) and their phone number(s)? fly   Prior to hospitilization cognitive status: Alert/Oriented;No Deficits   Current cognitive status: Alert/Oriented;No Deficits   Equipment Currently Used at Home walker, rolling   Do you currently have service(s) that help you manage your care at home? No   Do you take prescription medications? Yes   Do you have prescription coverage? Yes   Do you have any problems affording any of your prescribed medications? No   Is the patient taking medications as prescribed? yes   Who is going to help you get home at discharge? fly   How do you get to doctors appointments? family or friend will provide   Are you on dialysis? No   Do you take coumadin? No   Discharge Plan A Home   Discharge Plan B Home   DME Needed Upon Discharge  none   Discharge Plan discussed with: Patient   Transition of Care Barriers None   Physical Activity   On average, how many days per week do you engage in moderate to strenuous exercise (like a brisk walk)? Pt Declined   On average, how many minutes do you engage in exercise at this level? Pt Declined   Financial Resource Strain   How hard is it for you to pay for the very basics like food, housing, medical care, and heating? Pt Declined   Housing Stability   In the last 12 months, was there a time when you were not able to pay the mortgage or rent on time? Pt Declined   In the last 12 months, was there a time when you did not have a steady place to sleep or slept in a shelter (including now)? Pt Declined   Transportation Needs   In the past 12 months, has lack of transportation kept you from medical appointments or from getting medications? Pt Declined   In the past 12 months, has  lack of transportation kept you from meetings, work, or from getting things needed for daily living? Pt Declined   Food Insecurity   Within the past 12 months, you worried that your food would run out before you got the money to buy more. Pt Declined   Within the past 12 months, the food you bought just didn't last and you didn't have money to get more. Pt Declined   Stress   Do you feel stress - tense, restless, nervous, or anxious, or unable to sleep at night because your mind is troubled all the time - these days? Pt Declined   Social Connections   In a typical week, how many times do you talk on the phone with family, friends, or neighbors? Pt Declined   How often do you get together with friends or relatives? Pt Declined   How often do you attend Judaism or Catholic services? Pt Declined   Do you belong to any clubs or organizations such as Judaism groups, unions, fraternal or athletic groups, or school groups? Pt Declined   How often do you attend meetings of the clubs or organizations you belong to? Pt Declined   Are you , , , , never , or living with a partner? Pt Declined   Alcohol Use   Q1: How often do you have a drink containing alcohol? Pt Declined   Q2: How many drinks containing alcohol do you have on a typical day when you are drinking? Pt Declined   Q3: How often do you have six or more drinks on one occasion? Pt Declined     Current / [facility] HH.

## 2024-02-23 NOTE — PT/OT/SLP EVAL
Physical Therapy Evaluation    Patient Name:  Garrison Garrett   MRN:  [id number]    Recommendations:     Discharge Recommendations: Low Intensity Therapy   Discharge Equipment Recommendations: none   Barriers to discharge: None    Assessment:     Garrison Garrett is a 83 y.o. male admitted with a medical diagnosis of Acute cystitis with hematuria.  He presents with the following impairments/functional limitations: impaired endurance, weakness, impaired functional mobility .    Rehab Prognosis: Good; patient would benefit from acute skilled PT services to address these deficits and reach maximum level of function.    Recent Surgery: * No surgery found *      Plan:     During this hospitalization, patient to be seen 5 x/week to address the identified rehab impairments via gait training, therapeutic activities, therapeutic exercises and progress toward the following goals:    Plan of Care Expires:       Subjective     Chief Complaint: none  Patient/Family Comments/goals: home tomorrow  Pain/Comfort:       Patients cultural, spiritual, Judaism conflicts given the current situation:      Living Environment:  Pt lives in  home with spouse  Prior to admission, patients level of function was I amb with RW.  Equipment used at home: walker, rolling.  DME owned (not currently used): none.  Upon discharge, patient will have assistance from spouse and daughter caregiver.    Objective:     Communicated with pt, spouse, daughter caregiver prior to session.  Patient found HOB elevated with    upon PT entry to room.    General Precautions: Standard,    Orthopedic Precautions:    Braces:    Respiratory Status: Room air    Exams:  RLE Strength: WFL  LLE Strength: WFL    Functional Mobility:  Bed Mobility:     Scooting: stand by assistance  Supine to Sit: stand by assistance  Transfers:     Sit to Stand:  stand by assistance with rolling walker  Gait: amb 65ft RW SBA      AM-PAC 6 CLICK MOBILITY  Total Score:         Treatment & Education:  Set up recliner chair for meals/daily use in room but pt declines up in chair at this time.  Seated EOB pt was instructed BLE AROM ex to tolerance.    Patient left HOB elevated with all lines intact, call button in reach, and caregivers present.    GOALS:   Multidisciplinary Problems       Physical Therapy Goals          Problem: Physical Therapy    Goal Priority Disciplines Outcome Goal Variances Interventions   Physical Therapy Goal     PT, PT/OT Ongoing, Progressing     Description: 1.  Pt will amb 150ft RW with supervision of PT/Nsg staff for safe mobility and fall prevention  2.  Pt will be setup with recliner chair and tf to chair SBA  3.  Pt will be I HEP with PT/caregiver to maintain strength Les while inpt                       History:     Past Medical History:   Diagnosis Date    Anticoagulant long-term use     Arthritis     CAD (coronary artery disease)     CHF (congestive heart failure)     HLD (hyperlipidemia)     [place] (hard of hearing)     HTN (hypertension)     Paroxysmal atrial fibrillation        Past Surgical History:   Procedure Laterality Date    CARDIOVERSION  04/12/2021    [facility]    CATARACT EXTRACTION Bilateral     CLOSURE OF LEFT ATRIAL APPENDAGE USING DEVICE N/A 01/24/2023    Procedure: CLOSURE, LEFT ATRIAL APPENDAGE, USING DEVICE;  Surgeon: Joce Layne MD;  Location: [facility] CATH LAB;  Service: Cardiology;  Laterality: N/A;  AMULET W/ INTRA OP WISAM    ECHOCARDIOGRAM,TRANSESOPHAGEAL N/A 01/24/2023    Procedure: ECHOCARDIOGRAM,TRANSESOPHAGEAL;  Surgeon: Chu Diagnostic Provider;  Location: [facility] CATH LAB;  Service: Cardiology;  Laterality: N/A;    EYE SURGERY  2000    [facility]    MULTIPLE TOOTH EXTRACTIONS      SINUS SURGERY         Time Tracking:     PT Received On: 02/23/24  PT Start Time: 1030     PT Stop Time: 1100  PT Total Time (min): 30 min     Billable Minutes: Evaluation 15, Gait Training 10, and Therapeutic Exercise 5      02/23/2024

## 2024-02-23 NOTE — PROGRESS NOTES
Hospital Medicine progress note      Patient Name: Garrison Garrett  MRN: [id number]  Patient Class: IP- Inpatient   Admission Date: 2/22/2024   Admitting Physician: BHAVIN Service   Length of Stay: 1  Attending Physician: Unique Antonio MD  Primary Care Provider: Kaylyn Hollis DO  Face-to-Face encounter date: 02/23/2024  Code Status:  Chief Complaint: Fatigue (C/o feeling weak around 3am, EMS reports hypotension and a-fib.)        Patient information was obtained from patient, patient's family, past medical records and ER records.  ED records were reviewed in detail and documented below    HISTORY OF PRESENT ILLNESS:   Garrison Garrett is a 83 y.o. male who  has a past medical history of Anticoagulant long-term use, Arthritis, CAD (coronary artery disease), CHF (congestive heart failure), HLD (hyperlipidemia), [place] (hard of hearing), HTN (hypertension), and Paroxysmal atrial fibrillation..     83 years old male history of CHF and AFib presented to emergency room complain of painful frequent urination for 6 days     No fever, no shortness for breath, no cough, no nausea     The patient came to our emergency room 4 days ago, diagnosed with a UTI and discharged with Macrobid     The patient developed skin rash, nausea vomiting and diarrhea, painful frequent urination did not improve     At emergency room, WBC 10896 hemoglobin 9.6, sodium 128, BUN 30, creatinine 1.6, baseline creatinine 1.2, lactic acid 3.7     No imaging study was done     Patient was admitted to the hospital for acute UTI and acute on chronic renal failure    February 23, 2024-acute renal failure was treated with normal saline 75 cc/hour, creatinine improved to 1.2 from 1.6, UTI was treated with IV Rocephin, WBC improved to 7000 from 13,000, no diarrhea, no nausea, no vomiting  PAST MEDICAL HISTORY:     Past Medical History:   Diagnosis Date    Anticoagulant long-term use     Arthritis     CAD (coronary artery disease)     CHF (congestive heart  failure)     HLD (hyperlipidemia)     [place] (hard of hearing)     HTN (hypertension)     Paroxysmal atrial fibrillation        PAST SURGICAL HISTORY:     Past Surgical History:   Procedure Laterality Date    CARDIOVERSION  04/12/2021    [facility]    CATARACT EXTRACTION Bilateral     CLOSURE OF LEFT ATRIAL APPENDAGE USING DEVICE N/A 01/24/2023    Procedure: CLOSURE, LEFT ATRIAL APPENDAGE, USING DEVICE;  Surgeon: Joce Layne MD;  Location: [facility] CATH LAB;  Service: Cardiology;  Laterality: N/A;  AMULET W/ INTRA OP [facility]    ECHOCARDIOGRAM,TRANSESOPHAGEAL N/A 01/24/2023    Procedure: ECHOCARDIOGRAM,TRANSESOPHAGEAL;  Surgeon: Chu Diagnostic Provider;  Location: [facility] CATH LAB;  Service: Cardiology;  Laterality: N/A;    EYE SURGERY  2000    [place]    MULTIPLE TOOTH EXTRACTIONS      SINUS SURGERY         ALLERGIES:   Doxazosin, Entresto [sacubitril-valsartan], and Hydrochlorothiazide    FAMILY HISTORY:   Reviewed and negative    SOCIAL HISTORY:     Social History     Tobacco Use    Smoking status: Former     Types: Cigarettes     Passive exposure: Never    Smokeless tobacco: Never    Tobacco comments:     Quit 30 - 35 years ago   Substance Use Topics    Alcohol use: Yes     Alcohol/week: 6.0 standard drinks of alcohol     Types: 6 Cans of beer per week     Comment: Daily        HOME MEDICATIONS:     Prior to Admission medications    Medication Sig Start Date End Date Taking? Authorizing Provider   amiodarone (PACERONE) 200 MG Tab Take 1 tablet (200 mg total) by mouth once daily. 1/25/23  Yes Brooklyn Duenas FNP   aspirin (ECOTRIN) 81 MG EC tablet Take 1 tablet (81 mg total) by mouth once daily. 1/25/23 2/22/24 Yes Brooklyn Duenas FNP   atorvastatin (LIPITOR) 40 MG tablet Take 40 mg by mouth once daily. 9/24/22  Yes Provider, Historical   cholecalciferol, vitamin D3, 1,250 mcg (50,000 unit) capsule Take 50,000 Units by mouth every 7 days. 2/7/24  Yes Provider, Historical   benzonatate (TESSALON) 100 MG capsule Take 1  capsule (100 mg total) by mouth 3 (three) times daily as needed for Cough. 2/6/24   Kaylyn Hollis,    furosemide (LASIX) 40 MG tablet Take 40 mg by mouth every Mon, Wed, Fri. 4/22/22   Provider, Historical   lisinopriL (PRINIVIL,ZESTRIL) 20 MG tablet Take 20 mg by mouth. 11/28/23   Provider, Historical   nitrofurantoin, macrocrystal-monohydrate, (MACROBID) 100 MG capsule Take 1 capsule (100 mg total) by mouth 2 (two) times daily. for 7 days 2/18/24 2/25/24  Gordon Velez MD   sacubitriL-valsartan (ENTRESTO) 24-26 mg per tablet Take 2 tablets by mouth once daily. Patient takes one in the morning and one at night    Provider, Historical   valsartan (DIOVAN) 40 MG tablet Take 40 mg by mouth 2 (two) times daily. 12/10/23   Provider, Historical       REVIEW OF SYSTEMS:   Except as documented, all other systems reviewed and negative     PHYSICAL EXAM:     VITAL SIGNS: 24 HRS MIN & MAX LAST   Temp  Min: 97.7 °F (36.5 °C)  Max: 98.3 °F (36.8 °C) 97.9 °F (36.6 °C)   BP  Min: 112/71  Max: 145/78 (!) 145/78   Pulse  Min: 90  Max: 101  95   Resp  Min: 22  Max: 22 (!) 22   SpO2  Min: 94 %  Max: 98 % 96 %     General appearance: Well-developed, well-nourished male in no apparent distress.  HENT: Atraumatic head. Moist mucous membranes of oral cavity.  Eyes: Normal extraocular movements.   Neck: Supple.   Lungs: Clear to auscultation bilaterally. No wheezing present.   Heart: Regular rate and rhythm. S1 and S2 present with no murmurs/gallop/rub. No pedal edema. No JVD present.   Abdomen: Soft, non-distended, non-tender. No rebound tenderness/guarding. Bowel sounds are normal.   Extremities: No cyanosis, clubbing, or edema.  Skin: No Rash.   Neuro: Motor and sensory exams grossly intact. Good tone. Muscle strength 5/5 in all 4 extremities  Psych/mental status: Appropriate mood and affect. Responds appropriately to questions.     LABS AND IMAGING:     Recent Labs   Lab 02/18/24  0039 02/22/24  0810 02/23/24  0408   WBC  13.66* 13.73* 7.66   RBC 3.84* 3.48* 2.97*   HGB 10.5* 9.6* 8.1*   HCT 33.8* 30.4* 26.2*   MCV 88.0 87.4 88.2   MCH 27.3 27.6 27.3   MCHC 31.1* 31.6* 30.9*   RDW 15.9 15.8 15.9   * 428* 369   MPV 8.8 9.4 9.5         Recent Labs   Lab 02/18/24  0039 02/22/24  0810 02/23/24  0408   * 128* 130*   K 4.1 4.5 3.4*   CO2 20* 19* 19*   BUN 22.0 30.0* 27.0*   CREATININE 1.47* 1.60* 1.29*   CALCIUM 9.5 7.8* 8.0*   ALBUMIN 2.4* 1.9* 1.8*   ALKPHOS 70 47 40   ALT 47 29 23   AST 51* 29 24   BILITOT 0.4 0.5 0.3         Microbiology Results (last 7 days)       Procedure Component Value Units Date/Time    Blood culture #2 **CANNOT BE ORDERED STAT** [[id number]] Collected: 02/22/24 0812    Order Status: Resulted Specimen: Blood Updated: 02/22/24 0815    Blood culture #1 **CANNOT BE ORDERED STAT** [[id number]] Collected: 02/22/24 0810    Order Status: Resulted Specimen: Blood Updated: 02/22/24 0815             CT Hip Without Contrast Left  Narrative: EXAMINATION:  CT HIP WITHOUT CONTRAST LEFT    CLINICAL HISTORY:  Fracture, hip;, Fracture of unspecified part of neck of left femur, subsequent encounter for closed fracture with routine healing.    TECHNIQUE:  PATIENT RADIATION DOSE: DLP(mGycm) 210    As per PQRS measures, all CT scans at this facility used dose modulation, iterative reconstruction, and/or weight based dose adjustment when appropriate to reduce radiation dose to as low as reasonably achievable.    COMPARISON:  None available    FINDINGS:  Serial axial images changes are noted to the left hip without the administration of IV contrast.  Both soft tissue and bone windows were performed.  Additional sagittal and coronal reconstructions were obtained.  There is a slightly displaced oblique mildly comminuted fracture at the left greater trochanter.  No other definite fracture or dislocation is identified.  There is minimal narrowing of the left hip joint.  Bony structures are osteopenic.  Extensive  atherosclerosis is identified.  No aggressive osteolytic or osteoblastic lesion is seen.  The bladder is distended with fluid.  Enthesophyte formation is evident at the inferior ischium.  No significant effusion is appreciated.  Impression: 1. Mildly comminuted slightly displaced oblique fracture at the left greater trochanter  2. Osteopenia  3. Mild osteoarthritis  4. Extensive atherosclerosis  5. MR examination would allow further evaluation if clinically indicated    Electronically signed by: John Dasilva  Date:    01/16/2024  Time:    10:30  X-Ray Hip 2 or 3 views Left (with Pelvis when performed)  Narrative: EXAMINATION:  XR HIP WITH PELVIS WHEN PERFORMED, 2 OR 3 VIEWS LEFT    CLINICAL HISTORY:  Other injury of unspecified body region, initial encounter; .    COMPARISON:  01/09/2024    FINDINGS:  AP and frog-leg lateral views reveala slightly displaced superior greater trochanteric fracture which is similar to the prior exam.  Hip joints appear grossly intact.  Bony structures are osteopenic.  Extensive atherosclerosis is identified.  No aggressive osteolytic or osteoblastic lesion is seen.  Obturator foramen and iliac bones are asymmetric in appearance; likely due to patient positioning/rotation.  Impression: 1. Slightly displaced greater trochanteric fracture which has a similar appearance to the prior exam  2. Osteopenia  3. Extensive atherosclerosis  4. Osteoarthritis    Electronically signed by: John Dasilva  Date:    01/16/2024  Time:    10:21      ASSESSMENT & PLAN:     Acute renal failure secondary to vomiting and diarrhea   Acute UTI-failed outpatient treatment   Skin rash secondary to Macrobid   CAD   Chronic CHF   Chronic atrial fibrillation   Hypertension  Hypercholesterolemia    Continue IV Rocephin  Continue hydration with normal saline 75 cc/hour   Continue amiodarone for AFib  Check blood culture urine culture   Check CBC BMP in a.m.  Possible discharge home tomorrow   Continue physical  therapy    VTE Prophylaxis: will be placed on Heparin/Lovenox/ Xarelto/ SCD for DVT prophylaxis and will be advised to be as mobile as possible and sit in a chair as tolerated    Patient condition:  Stable/Fair/Guarded/ Serious/ Critical    __________________________________________________________________________  INPATIENT LIST OF MEDICATIONS     Scheduled Meds:   amiodarone  200 mg Oral Daily    aspirin  81 mg Oral Daily    atorvastatin  40 mg Oral Daily    cefTRIAXone (Rocephin) IV (PEDS and ADULTS)  1 g Intravenous Q24H     Continuous Infusions:   sodium chloride 0.9% 75 mL/hr at 02/23/24 0113     PRN Meds:.bismuth subsalicylate, melatonin, sodium chloride 0.9%      I, _ NP/PA have reviewed and discussed the case with  _   Please see the following addendum for further assessment and plan from there attending MD.    02/23/2024    ________________________________________________________________________________    MD Addendum:  Dr. MARIAM ---assumed care of this patient today at ---am/pm  For the patient encounter, I performed the substantive portion of the visit, I reviewed the NP/PA documentation, treatment plan, and medical decision making.  I had face to face time with this patient     A. History:    B. Physical exam:    C. Medical decision making:    Discharge Planning and Disposition: No mobility needs. Ambulating well. Good social support system.   Anticipated discharge    If patient was admitted under observational status it is with my approval/permission.        All diagnosis and differential diagnosis have been reviewed; assessment and plan has been documented; I have personally reviewed the labs and test results that are presently available; I have reviewed the patients medication list; I have reviewed the consulting providers response and recommendations. I have reviewed or attempted to review medical records based upon their availability.    All of the patient and family questions have been addressed  and answered. Patient's is agreeable to the above stated plan. I will continue to monitor closely and make adjustments to medical management as needed.    If patient was admitted under observational status it is with my approval/permission.      Unique Antonio MD   02/23/2024

## 2024-02-23 NOTE — PLAN OF CARE
Problem: Adult Inpatient Plan of Care  Goal: Plan of Care Review  Outcome: Ongoing, Progressing  Goal: Patient-Specific Goal (Individualized)  Outcome: Ongoing, Progressing  Goal: Absence of Hospital-Acquired Illness or Injury  Outcome: Ongoing, Progressing  Goal: Optimal Comfort and Wellbeing  Outcome: Ongoing, Progressing  Goal: Readiness for Transition of Care  Outcome: Ongoing, Progressing     Problem: UTI (Urinary Tract Infection)  Goal: Improved Infection Symptoms  Outcome: Ongoing, Progressing     Problem: Infection  Goal: Absence of Infection Signs and Symptoms  Outcome: Ongoing, Progressing     Problem: Impaired Wound Healing  Goal: Optimal Wound Healing  Outcome: Ongoing, Progressing     Problem: Skin Injury Risk Increased  Goal: Skin Health and Integrity  Outcome: Ongoing, Progressing

## 2024-02-24 VITALS
WEIGHT: 158 LBS | SYSTOLIC BLOOD PRESSURE: 148 MMHG | HEIGHT: 67 IN | HEART RATE: 101 BPM | DIASTOLIC BLOOD PRESSURE: 83 MMHG | BODY MASS INDEX: 24.8 KG/M2 | TEMPERATURE: 98 F | OXYGEN SATURATION: 95 % | RESPIRATION RATE: 18 BRPM

## 2024-02-24 LAB
ANION GAP SERPL CALC-SCNC: 8 MEQ/L
BASOPHILS # BLD AUTO: 0.03 X10(3)/MCL
BASOPHILS NFR BLD AUTO: 0.3 %
BUN SERPL-MCNC: 26 MG/DL (ref 8.4–25.7)
CALCIUM SERPL-MCNC: 7.7 MG/DL (ref 8.8–10)
CHLORIDE SERPL-SCNC: 106 MMOL/L (ref 98–107)
CO2 SERPL-SCNC: 19 MMOL/L (ref 23–31)
CREAT SERPL-MCNC: 1.14 MG/DL (ref 0.73–1.18)
CREAT/UREA NIT SERPL: 23
EOSINOPHIL # BLD AUTO: 0.06 X10(3)/MCL (ref 0–0.9)
EOSINOPHIL NFR BLD AUTO: 0.6 %
ERYTHROCYTE [DISTWIDTH] IN BLOOD BY AUTOMATED COUNT: 15.9 % (ref 11.5–17)
GFR SERPLBLD CREATININE-BSD FMLA CKD-EPI: >60 MLS/MIN/1.73/M2
GLUCOSE SERPL-MCNC: 103 MG/DL (ref 82–115)
HCT VFR BLD AUTO: 26.6 % (ref 42–52)
HGB BLD-MCNC: 8.2 G/DL (ref 14–18)
IMM GRANULOCYTES # BLD AUTO: 0.06 X10(3)/MCL (ref 0–0.04)
IMM GRANULOCYTES NFR BLD AUTO: 0.6 %
LYMPHOCYTES # BLD AUTO: 1.07 X10(3)/MCL (ref 0.6–4.6)
LYMPHOCYTES NFR BLD AUTO: 10.7 %
MCH RBC QN AUTO: 26.7 PG (ref 27–31)
MCHC RBC AUTO-ENTMCNC: 30.8 G/DL (ref 33–36)
MCV RBC AUTO: 86.6 FL (ref 80–94)
MONOCYTES # BLD AUTO: 0.58 X10(3)/MCL (ref 0.1–1.3)
MONOCYTES NFR BLD AUTO: 5.8 %
NEUTROPHILS # BLD AUTO: 8.24 X10(3)/MCL (ref 2.1–9.2)
NEUTROPHILS NFR BLD AUTO: 82 %
PLATELET # BLD AUTO: 369 X10(3)/MCL (ref 130–400)
PMV BLD AUTO: 9.2 FL (ref 7.4–10.4)
POTASSIUM SERPL-SCNC: 3.1 MMOL/L (ref 3.5–5.1)
RBC # BLD AUTO: 3.07 X10(6)/MCL (ref 4.7–6.1)
SODIUM SERPL-SCNC: 133 MMOL/L (ref 136–145)
WBC # SPEC AUTO: 10.04 X10(3)/MCL (ref 4.5–11.5)

## 2024-02-24 PROCEDURE — 80048 BASIC METABOLIC PNL TOTAL CA: CPT | Performed by: INTERNAL MEDICINE

## 2024-02-24 PROCEDURE — 85025 COMPLETE CBC W/AUTO DIFF WBC: CPT | Performed by: INTERNAL MEDICINE

## 2024-02-24 PROCEDURE — 63600175 PHARM REV CODE 636 W HCPCS: Performed by: INTERNAL MEDICINE

## 2024-02-24 PROCEDURE — 25000003 PHARM REV CODE 250: Performed by: INTERNAL MEDICINE

## 2024-02-24 RX ORDER — ONDANSETRON 4 MG/1
4 TABLET, ORALLY DISINTEGRATING ORAL EVERY 6 HOURS PRN
Qty: 20 TABLET | Refills: 0 | Status: SHIPPED | OUTPATIENT
Start: 2024-02-24 | End: 2024-04-22

## 2024-02-24 RX ORDER — CEPHALEXIN 500 MG/1
500 CAPSULE ORAL EVERY 12 HOURS
Qty: 14 CAPSULE | Refills: 0 | Status: SHIPPED | OUTPATIENT
Start: 2024-02-24 | End: 2024-03-02

## 2024-02-24 RX ADMIN — ASPIRIN 81 MG: 81 TABLET, COATED ORAL at 08:02

## 2024-02-24 RX ADMIN — ATORVASTATIN CALCIUM 40 MG: 40 TABLET, FILM COATED ORAL at 08:02

## 2024-02-24 RX ADMIN — SODIUM CHLORIDE: 9 INJECTION, SOLUTION INTRAVENOUS at 02:02

## 2024-02-24 RX ADMIN — CEFTRIAXONE SODIUM 1 G: 1 INJECTION, POWDER, FOR SOLUTION INTRAMUSCULAR; INTRAVENOUS at 12:02

## 2024-02-24 RX ADMIN — AMIODARONE HYDROCHLORIDE 200 MG: 200 TABLET ORAL at 08:02

## 2024-02-24 NOTE — DISCHARGE SUMMARY
Hospital Medicine discharge summary      Patient Name: Garrison Garrett  MRN: [id number]  Patient Class: IP- Inpatient   Admission Date: 2/22/2024   Admitting Physician:  Service   Length of Stay: 2  Attending Physician: Unique Antonio MD  Primary Care Provider: Kaylyn Hollis DO  Face-to-Face encounter date: 02/24/2024  Code Status:  Chief Complaint: Fatigue (C/o feeling weak around 3am, EMS reports hypotension and a-fib.)        Patient information was obtained from patient, patient's family, past medical records and ER records.  ED records were reviewed in detail and documented below    HISTORY OF PRESENT ILLNESS:   Garrison Garrett is a 83 y.o. male who  has a past medical history of Anticoagulant long-term use, Arthritis, CAD (coronary artery disease), CHF (congestive heart failure), HLD (hyperlipidemia), [place] (hard of hearing), HTN (hypertension), and Paroxysmal atrial fibrillation..     83 years old male history of CHF and AFib presented to emergency room complain of painful frequent urination for 6 days     No fever, no shortness for breath, no cough, no nausea     The patient came to our emergency room 4 days ago, diagnosed with a UTI and discharged with Macrobid     The patient developed skin rash, nausea vomiting and diarrhea, painful frequent urination did not improve     At emergency room, WBC 95525 hemoglobin 9.6, sodium 128, BUN 30, creatinine 1.6, baseline creatinine 1.2, lactic acid 3.7     No imaging study was done     Patient was admitted to the hospital for acute UTI and acute on chronic renal failure    February 23, 2024-acute renal failure was treated with normal saline 75 cc/hour, creatinine improved to 1.2 from 1.6, UTI was treated with IV Rocephin, WBC improved to 7000 from 13,000, no diarrhea, no nausea, no vomiting    February 24, 2024-creatinine improved to 1.1 from 1.2, blood culture urine culture was negative, no fever, no shortness for breath, no nausea     The patient can go  home today and have a follow-up with the primary care doctor     I gave the patient Keflex prescription  PAST MEDICAL HISTORY:     Past Medical History:   Diagnosis Date    Anticoagulant long-term use     Arthritis     CAD (coronary artery disease)     CHF (congestive heart failure)     HLD (hyperlipidemia)     [place] (hard of hearing)     HTN (hypertension)     Paroxysmal atrial fibrillation        PAST SURGICAL HISTORY:     Past Surgical History:   Procedure Laterality Date    CARDIOVERSION  04/12/2021    [facility]    CATARACT EXTRACTION Bilateral     CLOSURE OF LEFT ATRIAL APPENDAGE USING DEVICE N/A 01/24/2023    Procedure: CLOSURE, LEFT ATRIAL APPENDAGE, USING DEVICE;  Surgeon: Joce Layne MD;  Location: [facility] CATH LAB;  Service: Cardiology;  Laterality: N/A;  AMULET W/ INTRA OP [facility]AM    ECHOCARDIOGRAM,TRANSESOPHAGEAL N/A 01/24/2023    Procedure: ECHOCARDIOGRAM,TRANSESOPHAGEAL;  Surgeon: Chu Diagnostic Provider;  Location: [facility] CATH LAB;  Service: Cardiology;  Laterality: N/A;    EYE SURGERY  2000    [facility]    MULTIPLE TOOTH EXTRACTIONS      SINUS SURGERY         ALLERGIES:   Doxazosin, Entresto [sacubitril-valsartan], and Hydrochlorothiazide    FAMILY HISTORY:   Reviewed and negative    SOCIAL HISTORY:     Social History     Tobacco Use    Smoking status: Former     Types: Cigarettes     Passive exposure: Never    Smokeless tobacco: Never    Tobacco comments:     Quit 30 - 35 years ago   Substance Use Topics    Alcohol use: Yes     Alcohol/week: 6.0 standard drinks of alcohol     Types: 6 Cans of beer per week     Comment: Daily        HOME MEDICATIONS:     Prior to Admission medications    Medication Sig Start Date End Date Taking? Authorizing Provider   amiodarone (PACERONE) 200 MG Tab Take 1 tablet (200 mg total) by mouth once daily. 1/25/23  Yes Brooklyn Duenas FNP   aspirin (ECOTRIN) 81 MG EC tablet Take 1 tablet (81 mg total) by mouth once daily. 1/25/23 2/22/24 Yes Brooklyn Duenas FNP   atorvastatin  (LIPITOR) 40 MG tablet Take 40 mg by mouth once daily. 9/24/22  Yes Provider, Historical   cholecalciferol, vitamin D3, 1,250 mcg (50,000 unit) capsule Take 50,000 Units by mouth every 7 days. 2/7/24  Yes Provider, Historical   benzonatate (TESSALON) 100 MG capsule Take 1 capsule (100 mg total) by mouth 3 (three) times daily as needed for Cough. 2/6/24   Kaylyn Hollis, DO   furosemide (LASIX) 40 MG tablet Take 40 mg by mouth every Mon, Wed, Fri. 4/22/22   Provider, Historical   lisinopriL (PRINIVIL,ZESTRIL) 20 MG tablet Take 20 mg by mouth. 11/28/23   Provider, Historical   nitrofurantoin, macrocrystal-monohydrate, (MACROBID) 100 MG capsule Take 1 capsule (100 mg total) by mouth 2 (two) times daily. for 7 days 2/18/24 2/25/24  Gordon Velez MD   sacubitriL-valsartan (ENTRESTO) 24-26 mg per tablet Take 2 tablets by mouth once daily. Patient takes one in the morning and one at night    Provider, Historical   valsartan (DIOVAN) 40 MG tablet Take 40 mg by mouth 2 (two) times daily. 12/10/23   Provider, Historical       REVIEW OF SYSTEMS:   Except as documented, all other systems reviewed and negative     PHYSICAL EXAM:     VITAL SIGNS: 24 HRS MIN & MAX LAST   Temp  Min: 97.5 °F (36.4 °C)  Max: 98.3 °F (36.8 °C) 98 °F (36.7 °C)   BP  Min: 118/78  Max: 143/77 (!) 143/77   Pulse  Min: 94  Max: 109  98   Resp  Min: 18  Max: 18 18   SpO2  Min: 95 %  Max: 97 % 95 %     General appearance: Well-developed, well-nourished male in no apparent distress.  HENT: Atraumatic head. Moist mucous membranes of oral cavity.  Eyes: Normal extraocular movements.   Neck: Supple.   Lungs: Clear to auscultation bilaterally. No wheezing present.   Heart: Regular rate and rhythm. S1 and S2 present with no murmurs/gallop/rub. No pedal edema. No JVD present.   Abdomen: Soft, non-distended, non-tender. No rebound tenderness/guarding. Bowel sounds are normal.   Extremities: No cyanosis, clubbing, or edema.  Skin: No Rash.   Neuro: Motor and  sensory exams grossly intact. Good tone. Muscle strength 5/5 in all 4 extremities  Psych/mental status: Appropriate mood and affect. Responds appropriately to questions.     LABS AND IMAGING:     Recent Labs   Lab 02/22/24  0810 02/23/24  0408 02/24/24  0341   WBC 13.73* 7.66 10.04   RBC 3.48* 2.97* 3.07*   HGB 9.6* 8.1* 8.2*   HCT 30.4* 26.2* 26.6*   MCV 87.4 88.2 86.6   MCH 27.6 27.3 26.7*   MCHC 31.6* 30.9* 30.8*   RDW 15.8 15.9 15.9   * 369 369   MPV 9.4 9.5 9.2         Recent Labs   Lab 02/18/24  0039 02/22/24 0810 02/23/24 0408 02/24/24 0341   * 128* 130* 133*   K 4.1 4.5 3.4* 3.1*   CO2 20* 19* 19* 19*   BUN 22.0 30.0* 27.0* 26.0*   CREATININE 1.47* 1.60* 1.29* 1.14   CALCIUM 9.5 7.8* 8.0* 7.7*   ALBUMIN 2.4* 1.9* 1.8*  --    ALKPHOS 70 47 40  --    ALT 47 29 23  --    AST 51* 29 24  --    BILITOT 0.4 0.5 0.3  --          Microbiology Results (last 7 days)       Procedure Component Value Units Date/Time    Blood culture #1 **CANNOT BE ORDERED STAT** [[id number]]  (Normal) Collected: 02/22/24 0810    Order Status: Completed Specimen: Blood Updated: 02/23/24 1401     CULTURE, BLOOD ([facility]) No Growth At 24 Hours    Blood culture #2 **CANNOT BE ORDERED STAT** [[id number]]  (Normal) Collected: 02/22/24 0812    Order Status: Completed Specimen: Blood Updated: 02/23/24 1401     CULTURE, BLOOD ([facility]) No Growth At 24 Hours             CT Hip Without Contrast Left  Narrative: EXAMINATION:  CT HIP WITHOUT CONTRAST LEFT    CLINICAL HISTORY:  Fracture, hip;, Fracture of unspecified part of neck of left femur, subsequent encounter for closed fracture with routine healing.    TECHNIQUE:  PATIENT RADIATION DOSE: DLP(mGycm) 210    As per PQRS measures, all CT scans at this facility used dose modulation, iterative reconstruction, and/or weight based dose adjustment when appropriate to reduce radiation dose to as low as reasonably achievable.    COMPARISON:  None available    FINDINGS:  Serial axial images changes  are noted to the left hip without the administration of IV contrast.  Both soft tissue and bone windows were performed.  Additional sagittal and coronal reconstructions were obtained.  There is a slightly displaced oblique mildly comminuted fracture at the left greater trochanter.  No other definite fracture or dislocation is identified.  There is minimal narrowing of the left hip joint.  Bony structures are osteopenic.  Extensive atherosclerosis is identified.  No aggressive osteolytic or osteoblastic lesion is seen.  The bladder is distended with fluid.  Enthesophyte formation is evident at the inferior ischium.  No significant effusion is appreciated.  Impression: 1. Mildly comminuted slightly displaced oblique fracture at the left greater trochanter  2. Osteopenia  3. Mild osteoarthritis  4. Extensive atherosclerosis  5. MR examination would allow further evaluation if clinically indicated    Electronically signed by: John Dasilva  Date:    01/16/2024  Time:    10:30  X-Ray Hip 2 or 3 views Left (with Pelvis when performed)  Narrative: EXAMINATION:  XR HIP WITH PELVIS WHEN PERFORMED, 2 OR 3 VIEWS LEFT    CLINICAL HISTORY:  Other injury of unspecified body region, initial encounter; .    COMPARISON:  01/09/2024    FINDINGS:  AP and frog-leg lateral views reveala slightly displaced superior greater trochanteric fracture which is similar to the prior exam.  Hip joints appear grossly intact.  Bony structures are osteopenic.  Extensive atherosclerosis is identified.  No aggressive osteolytic or osteoblastic lesion is seen.  Obturator foramen and iliac bones are asymmetric in appearance; likely due to patient positioning/rotation.  Impression: 1. Slightly displaced greater trochanteric fracture which has a similar appearance to the prior exam  2. Osteopenia  3. Extensive atherosclerosis  4. Osteoarthritis    Electronically signed by: John Dasilva  Date:    01/16/2024  Time:    10:21      ASSESSMENT & PLAN:    Discharge diagnosis  Acute renal failure secondary to vomiting and diarrhea   Acute UTI-failed outpatient treatment   Skin rash secondary to Macrobid   CAD   Chronic CHF   Chronic atrial fibrillation   Hypertension  Hypercholesterolemia      Discharge condition-stable     Discharge destination-home-discharge time 30 minutes        VTE Prophylaxis: will be placed on Heparin/Lovenox/ Xarelto/ SCD for DVT prophylaxis and will be advised to be as mobile as possible and sit in a chair as tolerated    Patient condition:  Stable/Fair/Guarded/ Serious/ Critical    __________________________________________________________________________  INPATIENT LIST OF MEDICATIONS     Scheduled Meds:   amiodarone  200 mg Oral Daily    aspirin  81 mg Oral Daily    atorvastatin  40 mg Oral Daily    cefTRIAXone (Rocephin) IV (PEDS and ADULTS)  1 g Intravenous Q24H    ondansetron  4 mg Intravenous Once     Continuous Infusions:   sodium chloride 0.9% 75 mL/hr at 02/24/24 0721     PRN Meds:.bismuth subsalicylate, melatonin, ondansetron, sodium chloride 0.9%      I, _ NP/PA have reviewed and discussed the case with  _   Please see the following addendum for further assessment and plan from there attending MD.    02/24/2024    ________________________________________________________________________________    MD Addendum:  Dr. MARIAM ---assumed care of this patient today at ---am/pm  For the patient encounter, I performed the substantive portion of the visit, I reviewed the NP/PA documentation, treatment plan, and medical decision making.  I had face to face time with this patient     A. History:    B. Physical exam:    C. Medical decision making:    Discharge Planning and Disposition: No mobility needs. Ambulating well. Good social support system.   Anticipated discharge    If patient was admitted under observational status it is with my approval/permission.        All diagnosis and differential diagnosis have been reviewed; assessment and  plan has been documented; I have personally reviewed the labs and test results that are presently available; I have reviewed the patients medication list; I have reviewed the consulting providers response and recommendations. I have reviewed or attempted to review medical records based upon their availability.    All of the patient and family questions have been addressed and answered. Patient's is agreeable to the above stated plan. I will continue to monitor closely and make adjustments to medical management as needed.    If patient was admitted under observational status it is with my approval/permission.      Unique Antonio MD   02/24/2024

## 2024-02-24 NOTE — PT/OT/SLP PROGRESS
Physical Therapy      Patient Name:  Garrison Garrett   MRN:  [id number]    Patient not seen today secondary to refusal of service . Will follow-up Monday.

## 2024-02-24 NOTE — NURSING
Pt was discharged today and is going home.  Educated the pt via avs in regards to medication changes,follow up appts,and clinical documents. Pt had no further questions at this time. It was a pleasure taking care of Mr Garrett.

## 2024-02-26 ENCOUNTER — PATIENT OUTREACH (OUTPATIENT)
Dept: ADMINISTRATIVE | Facility: CLINIC | Age: 84
End: 2024-02-26
Payer: MEDICARE

## 2024-02-26 NOTE — PROGRESS NOTES
C3 nurse attempted to contact Garrison Garrett for a TCC post hospital discharge follow up call. No answer. Left voicemail with callback information. The patient does not have a scheduled HOSFU appointment. Message sent to PCP staff for assistance with scheduling visit with patient.

## 2024-02-27 LAB
BACTERIA BLD CULT: NORMAL
BACTERIA BLD CULT: NORMAL

## 2024-02-27 NOTE — TELEPHONE ENCOUNTER
Spoke with patients wife Mrs. Zuniga and explained to her that patient needs to f/u with Dr. Hollis for  hospital f/u withing 7 days. She voiced understanding and appointment scheduled. Omari

## 2024-02-28 ENCOUNTER — TELEPHONE (OUTPATIENT)
Dept: FAMILY MEDICINE | Facility: CLINIC | Age: 84
End: 2024-02-28
Payer: MEDICARE

## 2024-02-28 NOTE — TELEPHONE ENCOUNTER
Patient declined appt at this time and cancelled hosp follow up. He said he baljit call back at a later time to schedule.

## 2024-02-29 NOTE — PROGRESS NOTES
2nd attempt-C3 nurse attempted to contact Garrison Garrett for a TCC post hospital discharge follow up call. No answer. Left voicemail with callback information. The patient does not have a scheduled HOSFU appointment. Message sent to PCP staff for assistance with scheduling visit with patient.

## 2024-02-29 NOTE — PROGRESS NOTES
C3 nurse spoke with Garrison Garrett for a TCC post hospital discharge follow up call. The patient does not have a scheduled HOSFU appointment with Kaylyn Hollis DO within 5-7 days post hospital discharge date 2/24/24. C3 nurse was unable to schedule HOSFU appointment in [facility].  PCP office did try to schedule appt with the patient but the patient declined and stated he would call and schedule the appt.

## 2024-03-04 ENCOUNTER — LAB VISIT (OUTPATIENT)
Dept: LAB | Facility: HOSPITAL | Age: 84
End: 2024-03-04
Attending: STUDENT IN AN ORGANIZED HEALTH CARE EDUCATION/TRAINING PROGRAM
Payer: MEDICARE

## 2024-03-04 ENCOUNTER — OFFICE VISIT (OUTPATIENT)
Dept: FAMILY MEDICINE | Facility: CLINIC | Age: 84
End: 2024-03-04
Payer: MEDICARE

## 2024-03-04 VITALS
OXYGEN SATURATION: 96 % | BODY MASS INDEX: 24.8 KG/M2 | RESPIRATION RATE: 16 BRPM | TEMPERATURE: 98 F | HEIGHT: 67 IN | HEART RATE: 95 BPM | DIASTOLIC BLOOD PRESSURE: 62 MMHG | SYSTOLIC BLOOD PRESSURE: 110 MMHG | WEIGHT: 158 LBS

## 2024-03-04 DIAGNOSIS — D64.9 NORMOCYTIC ANEMIA: ICD-10-CM

## 2024-03-04 DIAGNOSIS — N30.00 ACUTE CYSTITIS WITHOUT HEMATURIA: Primary | ICD-10-CM

## 2024-03-04 DIAGNOSIS — N30.00 ACUTE CYSTITIS WITHOUT HEMATURIA: ICD-10-CM

## 2024-03-04 DIAGNOSIS — N19 RENAL FAILURE, UNSPECIFIED CHRONICITY: ICD-10-CM

## 2024-03-04 LAB
ALBUMIN SERPL-MCNC: 2.1 G/DL (ref 3.4–4.8)
ALBUMIN/GLOB SERPL: 0.4 RATIO (ref 1.1–2)
ALP SERPL-CCNC: 70 UNIT/L (ref 40–150)
ALT SERPL-CCNC: 12 UNIT/L (ref 0–55)
AST SERPL-CCNC: 18 UNIT/L (ref 5–34)
BASOPHILS # BLD AUTO: 0.05 X10(3)/MCL
BASOPHILS NFR BLD AUTO: 0.6 %
BILIRUB SERPL-MCNC: 0.5 MG/DL
BUN SERPL-MCNC: 14 MG/DL (ref 8.4–25.7)
CALCIUM SERPL-MCNC: 8.7 MG/DL (ref 8.8–10)
CHLORIDE SERPL-SCNC: 102 MMOL/L (ref 98–107)
CO2 SERPL-SCNC: 24 MMOL/L (ref 23–31)
CREAT SERPL-MCNC: 1.06 MG/DL (ref 0.73–1.18)
EOSINOPHIL # BLD AUTO: 0.05 X10(3)/MCL (ref 0–0.9)
EOSINOPHIL NFR BLD AUTO: 0.6 %
ERYTHROCYTE [DISTWIDTH] IN BLOOD BY AUTOMATED COUNT: 16.8 % (ref 11.5–17)
FERRITIN SERPL-MCNC: 737.35 NG/ML (ref 21.81–274.66)
FOLATE SERPL-MCNC: 7.4 NG/ML (ref 7–31.4)
GFR SERPLBLD CREATININE-BSD FMLA CKD-EPI: >60 MLS/MIN/1.73/M2
GLOBULIN SER-MCNC: 4.7 GM/DL (ref 2.4–3.5)
GLUCOSE SERPL-MCNC: 98 MG/DL (ref 82–115)
HCT VFR BLD AUTO: 28.3 % (ref 42–52)
HGB BLD-MCNC: 8.9 G/DL (ref 14–18)
IMM GRANULOCYTES # BLD AUTO: 0.03 X10(3)/MCL (ref 0–0.04)
IMM GRANULOCYTES NFR BLD AUTO: 0.4 %
IRON SATN MFR SERPL: 11 % (ref 20–50)
IRON SERPL-MCNC: 15 UG/DL (ref 65–175)
LYMPHOCYTES # BLD AUTO: 1.47 X10(3)/MCL (ref 0.6–4.6)
LYMPHOCYTES NFR BLD AUTO: 18.1 %
MCH RBC QN AUTO: 27.1 PG (ref 27–31)
MCHC RBC AUTO-ENTMCNC: 31.4 G/DL (ref 33–36)
MCV RBC AUTO: 86 FL (ref 80–94)
MONOCYTES # BLD AUTO: 0.69 X10(3)/MCL (ref 0.1–1.3)
MONOCYTES NFR BLD AUTO: 8.5 %
NEUTROPHILS # BLD AUTO: 5.85 X10(3)/MCL (ref 2.1–9.2)
NEUTROPHILS NFR BLD AUTO: 71.8 %
NRBC BLD AUTO-RTO: 0 %
PLATELET # BLD AUTO: 463 X10(3)/MCL (ref 130–400)
PMV BLD AUTO: 9 FL (ref 7.4–10.4)
POTASSIUM SERPL-SCNC: 5 MMOL/L (ref 3.5–5.1)
PROT SERPL-MCNC: 6.8 GM/DL (ref 5.8–7.6)
RBC # BLD AUTO: 3.29 X10(6)/MCL (ref 4.7–6.1)
SODIUM SERPL-SCNC: 135 MMOL/L (ref 136–145)
TIBC SERPL-MCNC: 116 UG/DL (ref 69–240)
TIBC SERPL-MCNC: 131 UG/DL (ref 250–450)
TRANSFERRIN SERPL-MCNC: 118 MG/DL
TSH SERPL-ACNC: 4 UIU/ML (ref 0.35–4.94)
VIT B12 SERPL-MCNC: 595 PG/ML (ref 213–816)
WBC # SPEC AUTO: 8.14 X10(3)/MCL (ref 4.5–11.5)

## 2024-03-04 PROCEDURE — 83540 ASSAY OF IRON: CPT

## 2024-03-04 PROCEDURE — 99214 OFFICE O/P EST MOD 30 MIN: CPT | Mod: ,,, | Performed by: STUDENT IN AN ORGANIZED HEALTH CARE EDUCATION/TRAINING PROGRAM

## 2024-03-04 PROCEDURE — 82746 ASSAY OF FOLIC ACID SERUM: CPT

## 2024-03-04 PROCEDURE — 81001 URINALYSIS AUTO W/SCOPE: CPT

## 2024-03-04 PROCEDURE — 80053 COMPREHEN METABOLIC PANEL: CPT

## 2024-03-04 PROCEDURE — 82728 ASSAY OF FERRITIN: CPT

## 2024-03-04 PROCEDURE — 36415 COLL VENOUS BLD VENIPUNCTURE: CPT

## 2024-03-04 PROCEDURE — 85025 COMPLETE CBC W/AUTO DIFF WBC: CPT

## 2024-03-04 PROCEDURE — 82607 VITAMIN B-12: CPT

## 2024-03-04 PROCEDURE — 84443 ASSAY THYROID STIM HORMONE: CPT

## 2024-03-04 NOTE — PROGRESS NOTES
"Subjective:      Patient ID: Garrison Garrett is a 83 y.o.  male. He is accompanied by his wife, Mrs. Nadia Garrett.     Chief Complaint: Hospital Follow-Up    Hospital Follow-Up: Patient admitted on 02/22/24 and discharged on 02/24/24. Patient had been taking Macrobid for UTI treatment. He developed a rash with Macrobid, but since stopping it the rash has improved; however, it's still present. Associated symptoms include lower extremity swelling. He was treated with Rocephin and IVFs inpatient. Patient discharged with Keflex, but developed a rash again; so, he stopped taking Keflex. He denies any fevers, chills, N/V, dysuria, chest pain, palpitations.    Review of Systems   Constitutional:  Positive for fatigue. Negative for chills, diaphoresis and fever.   Respiratory:  Negative for shortness of breath.    Cardiovascular:  Positive for leg swelling. Negative for chest pain and palpitations.   Gastrointestinal:  Positive for nausea and vomiting. Negative for abdominal pain and blood in stool.   Genitourinary:  Positive for dysuria, frequency and hematuria.   Skin:  Positive for rash.   Neurological:  Positive for weakness.     Objective:   /62 (BP Location: Left arm)   Pulse 95   Temp 98.2 °F (36.8 °C) (Temporal)   Resp 16   Ht 5' 7" (1.702 m)   Wt 71.7 kg (158 lb)   SpO2 96%   BMI 24.75 kg/m²     Physical Exam  Vitals and nursing note reviewed.   Constitutional:       General: He is not in acute distress.     Appearance: Normal appearance. He is not ill-appearing or toxic-appearing.   HENT:      Head: Normocephalic and atraumatic.   Eyes:      Conjunctiva/sclera: Conjunctivae normal.   Cardiovascular:      Rate and Rhythm: Normal rate and regular rhythm.      Heart sounds: Murmur heard.   Pulmonary:      Effort: Pulmonary effort is normal. No respiratory distress.      Breath sounds: Normal breath sounds. No stridor. No wheezing.   Abdominal:      General: Bowel sounds are normal. There " is no distension.      Palpations: Abdomen is soft.      Tenderness: There is no abdominal tenderness.   Musculoskeletal:         General: No deformity.      Right lower leg: Edema (2+ pitting) present.      Left lower leg: Edema (2+ pitting) present.   Skin:     General: Skin is warm and dry.      Findings: Rash (scattered petechial rash on bilateral lower extremities) present. No lesion.   Neurological:      General: No focal deficit present.      Mental Status: He is alert.      Motor: Weakness (generalized) present.      Coordination: Coordination normal.   Psychiatric:         Mood and Affect: Mood normal.         Behavior: Behavior normal.         Thought Content: Thought content normal.         Judgment: Judgment normal.       Assessment/Plan:   1. Acute cystitis without hematuria  Comments:  - Patient asymptomatic.  - Hospital discharge summary reviewed/summarized in HPI.  Orders:  -     CBC Auto Differential; Future; Expected date: 03/04/2024  -     Comprehensive Metabolic Panel; Future; Expected date: 03/04/2024  -     Urinalysis, Reflex to Urine Culture; Future; Expected date: 03/04/2024    2. Renal failure, unspecified chronicity  Comments:  - Resolved.    3. Normocytic anemia  -     Ferritin; Future; Expected date: 03/04/2024  -     Iron and TIBC; Future; Expected date: 03/04/2024  -     Vitamin B12; Future; Expected date: 03/04/2024  -     Folate; Future; Expected date: 03/04/2024  -     TSH; Future; Expected date: 03/04/2024  -     Path Review, Peripheral Smear; Future; Expected date: 03/04/2024       Follow up in about 3 months (around 6/4/2024) for Chronic Medical Management.     Transitional Care Note    Family and/or Caretaker present at visit?  Yes.  Diagnostic tests reviewed/disposition: No diagnosic tests pending after this hospitalization.  Disease/illness education: Patient educated.  Home health/community services discussion/referrals: Patient does not have home health established from  hospital visit.  They do not need home health.  If needed, we will set up home health for the patient.   Establishment or re-establishment of referral orders for community resources: No other necessary community resources.   Discussion with other health care providers: No discussion with other health care providers necessary.

## 2024-03-05 ENCOUNTER — TELEPHONE (OUTPATIENT)
Dept: FAMILY MEDICINE | Facility: CLINIC | Age: 84
End: 2024-03-05
Payer: MEDICARE

## 2024-03-05 NOTE — TELEPHONE ENCOUNTER
----- Message from Kaitlin Hernandez sent at 3/5/2024 10:56 AM CST -----  Regarding: lab results  .Type:  Needs Medical Advice    Who Called: Pt's wifeNadia  Symptoms (please be specific):    How long has patient had these symptoms:    Pharmacy name and phone #:    Would the patient rather a call back or a response via [facility]? Call back  Best Call Back Number: [phone]  Additional Information: Requesting a call back from nurse regarding test results, states  didn't understand anything.

## 2024-03-06 LAB
APPEARANCE UR: ABNORMAL
BACTERIA #/AREA URNS AUTO: ABNORMAL /HPF
BILIRUB UR QL STRIP.AUTO: NEGATIVE
COLOR UR AUTO: YELLOW
GLUCOSE UR QL STRIP.AUTO: NEGATIVE
KETONES UR QL STRIP.AUTO: NEGATIVE
LEUKOCYTE ESTERASE UR QL STRIP.AUTO: ABNORMAL
NITRITE UR QL STRIP.AUTO: NEGATIVE
PH UR STRIP.AUTO: 6 [PH]
PROT UR QL STRIP.AUTO: 30
RBC #/AREA URNS AUTO: ABNORMAL /HPF
RBC UR QL AUTO: ABNORMAL
SP GR UR STRIP.AUTO: 1.02 (ref 1–1.03)
SQUAMOUS #/AREA URNS AUTO: ABNORMAL /HPF
UROBILINOGEN UR STRIP-ACNC: 0.2
WBC #/AREA URNS AUTO: >100 /HPF

## 2024-03-06 PROCEDURE — 87086 URINE CULTURE/COLONY COUNT: CPT | Performed by: STUDENT IN AN ORGANIZED HEALTH CARE EDUCATION/TRAINING PROGRAM

## 2024-03-07 LAB — VIEW PATHOLOGY REPORT (RELIAPATH): NORMAL

## 2024-03-08 RX ORDER — CIPROFLOXACIN 500 MG/1
500 TABLET ORAL 2 TIMES DAILY
Qty: 14 TABLET | Refills: 0 | Status: SHIPPED | OUTPATIENT
Start: 2024-03-08 | End: 2024-03-15

## 2024-03-09 LAB — BACTERIA UR CULT: ABNORMAL

## 2024-03-11 ENCOUNTER — TELEPHONE (OUTPATIENT)
Dept: FAMILY MEDICINE | Facility: CLINIC | Age: 84
End: 2024-03-11
Payer: MEDICARE

## 2024-03-11 ENCOUNTER — TELEPHONE (OUTPATIENT)
Dept: ADMINISTRATIVE | Facility: CLINIC | Age: 84
End: 2024-03-11
Payer: MEDICARE

## 2024-03-11 NOTE — TELEPHONE ENCOUNTER
----- Message from Denisse Garcia sent at 3/11/2024  9:26 AM CDT -----  Regarding: return call  Type:  Patient Returning Call    Who Called: pt wife  (martha)    Who Left Message for Patient: douglas    Does the patient know what this is regarding?: medication    Would the patient rather a call back or a response via [facility]? Call back    Best Call Back Number: [phone]    Additional Information:  please advise, pt wife martha called for return call back, thanks

## 2024-03-11 NOTE — TELEPHONE ENCOUNTER
Cipro called in for patient for UTI symptom while awaiting culture results over the weekend. Patient's wife called to notify that the patient has developed a rash while taking cipro. Patient advised to d/c cipro and that a message would be sent to the provider.     Please be advised that patient developed a rash as well from macrobid and keflex as well for UTI treatment over the past 3 weeks. After stopping both of the antibiotics the rash resolved

## 2024-03-11 NOTE — TELEPHONE ENCOUNTER
Noted, please schedule follow up with patient's provider. I would advise a virtual visit or in person OV to follow up asap. I will keep this note reply in Dr. Hollis's inbasket.

## 2024-03-12 ENCOUNTER — TELEPHONE (OUTPATIENT)
Dept: FAMILY MEDICINE | Facility: CLINIC | Age: 84
End: 2024-03-12
Payer: MEDICARE

## 2024-03-12 NOTE — TELEPHONE ENCOUNTER
Patient called to cancel appt for tomorrow to be evaluated for frequent UTI and rash. Patient said he was seeing his kidney doctor next week and would get them to evaluate him. Appt canceled per patient request

## 2024-03-12 NOTE — TELEPHONE ENCOUNTER
----- Message from [facility] sent at 3/12/2024  2:16 PM CDT -----  .Type:  Needs Medical Advice    Who Called:  pt    Symptoms (please be specific):  no     How long has patient had these symptoms:   no    Pharmacy name and phone #:   no    Would the patient rather a call back or a response via [facility]?      Best Call Back Number:  [phone]    Additional Information:  pt states he needs to speak to the nurse he says its confidential he needs a call back today if possible thanks

## 2024-03-14 ENCOUNTER — TELEPHONE (OUTPATIENT)
Dept: FAMILY MEDICINE | Facility: CLINIC | Age: 84
End: 2024-03-14
Payer: MEDICARE

## 2024-03-14 DIAGNOSIS — N30.01 ACUTE CYSTITIS WITH HEMATURIA: Primary | ICD-10-CM

## 2024-03-14 NOTE — TELEPHONE ENCOUNTER
I have ordered the following.    Orders Placed This Encounter   Procedures    Ambulatory referral/consult to Urology     Standing Status:   Future     Standing Expiration Date:   4/14/2025     Referral Priority:   Routine     Referral Type:   Consultation     Referral Reason:   Specialty Services Required     Requested Specialty:   Urology     Number of Visits Requested:   1

## 2024-03-14 NOTE — TELEPHONE ENCOUNTER
Spoke to patient and patient wife and they do agree with Dr. Hollis recommendation of see a Urologist.   Verbalized understanding that they we would refer them to [facility] and to expect a call x2 weeks time and if not to notify the office.   Patient has no follow up questions at this time.

## 2024-03-20 ENCOUNTER — DOCUMENT SCAN (OUTPATIENT)
Dept: HOME HEALTH SERVICES | Facility: HOSPITAL | Age: 84
End: 2024-03-20
Payer: MEDICARE

## 2024-03-23 ENCOUNTER — DOCUMENT SCAN (OUTPATIENT)
Dept: HOME HEALTH SERVICES | Facility: HOSPITAL | Age: 84
End: 2024-03-23
Payer: MEDICARE

## 2024-03-24 ENCOUNTER — DOCUMENT SCAN (OUTPATIENT)
Dept: HOME HEALTH SERVICES | Facility: HOSPITAL | Age: 84
End: 2024-03-24
Payer: MEDICARE

## 2024-03-26 ENCOUNTER — TELEPHONE (OUTPATIENT)
Dept: FAMILY MEDICINE | Facility: CLINIC | Age: 84
End: 2024-03-26
Payer: MEDICARE

## 2024-03-26 DIAGNOSIS — R54 AGE-RELATED PHYSICAL DEBILITY: Primary | ICD-10-CM

## 2024-03-26 NOTE — TELEPHONE ENCOUNTER
----- Message from Alondra De Jesus sent at 3/26/2024  3:11 PM CDT -----  Regarding: call back  .Type:  Needs Medical Advice    Who Called: [facility] in [place]  Would the patient rather a call back or a response via [facility]?   Best Call Back Number: [phone]  Additional Information: Jaclyn states pt needs another order for home care to the fax is [phone]

## 2024-03-26 NOTE — TELEPHONE ENCOUNTER
I have ordered the following.    Orders Placed This Encounter   Procedures    Ambulatory referral/consult to Home Health     Standing Status:   Future     Standing Expiration Date:   4/26/2025     Referral Priority:   Routine     Referral Type:   Home Health     Referral Reason:   Specialty Services Required     Requested Specialty:   Home Health Services     Number of Visits Requested:   1

## 2024-03-27 PROCEDURE — G0180 MD CERTIFICATION HHA PATIENT: HCPCS | Mod: ,,, | Performed by: STUDENT IN AN ORGANIZED HEALTH CARE EDUCATION/TRAINING PROGRAM

## 2024-04-02 ENCOUNTER — DOCUMENT SCAN (OUTPATIENT)
Dept: HOME HEALTH SERVICES | Facility: HOSPITAL | Age: 84
End: 2024-04-02
Payer: MEDICARE

## 2024-04-17 ENCOUNTER — EXTERNAL HOME HEALTH (OUTPATIENT)
Dept: HOME HEALTH SERVICES | Facility: HOSPITAL | Age: 84
End: 2024-04-17
Payer: MEDICARE

## 2024-04-22 ENCOUNTER — OFFICE VISIT (OUTPATIENT)
Dept: FAMILY MEDICINE | Facility: CLINIC | Age: 84
End: 2024-04-22
Payer: MEDICARE

## 2024-04-22 VITALS
TEMPERATURE: 98 F | HEART RATE: 72 BPM | WEIGHT: 158.81 LBS | SYSTOLIC BLOOD PRESSURE: 146 MMHG | HEIGHT: 67 IN | OXYGEN SATURATION: 97 % | DIASTOLIC BLOOD PRESSURE: 68 MMHG | RESPIRATION RATE: 16 BRPM | BODY MASS INDEX: 24.92 KG/M2

## 2024-04-22 DIAGNOSIS — K59.00 CONSTIPATION, UNSPECIFIED CONSTIPATION TYPE: ICD-10-CM

## 2024-04-22 DIAGNOSIS — R19.5 OCCULT BLOOD POSITIVE STOOL: ICD-10-CM

## 2024-04-22 DIAGNOSIS — I50.9 ACUTE ON CHRONIC CONGESTIVE HEART FAILURE, UNSPECIFIED HEART FAILURE TYPE: Primary | ICD-10-CM

## 2024-04-22 PROCEDURE — 99214 OFFICE O/P EST MOD 30 MIN: CPT | Mod: ,,, | Performed by: STUDENT IN AN ORGANIZED HEALTH CARE EDUCATION/TRAINING PROGRAM

## 2024-04-22 RX ORDER — PANTOPRAZOLE SODIUM 40 MG/1
1 TABLET, DELAYED RELEASE ORAL EVERY MORNING
COMMUNITY
Start: 2024-03-24 | End: 2025-03-24

## 2024-04-22 RX ORDER — DOCUSATE SODIUM 100 MG/1
100 CAPSULE, LIQUID FILLED ORAL 2 TIMES DAILY
Qty: 180 CAPSULE | Refills: 0 | Status: SHIPPED | OUTPATIENT
Start: 2024-04-22

## 2024-04-22 NOTE — PROGRESS NOTES
"Subjective:      Patient ID: Garrison Garrett is a 83 y.o.  male. He is accompanied by his wife, Mrs. Nadia Garrett.     Chief Complaint: Hospital Follow-Up    Hospital Follow-Up: Patient admitted on 03/20/24 and discharged on 03/24/24 for CHF exacerbation. H/H was low and he was positive for occult blood in stool. He was also positive for flu B. Cardiology and GI consulted. He was started on Tamiflu and was diuresed inpatient. Patient states compliance with iron supplementation daily. Today, patient states he is doing well. He has upcoming appointments with Cardiology and GI.    Review of Systems   Constitutional:  Negative for chills, diaphoresis, fatigue and fever.   Respiratory:  Positive for shortness of breath. Negative for wheezing and stridor.    Cardiovascular:  Positive for leg swelling. Negative for chest pain and palpitations.   Gastrointestinal:  Positive for constipation. Negative for abdominal pain, blood in stool, diarrhea, nausea and vomiting.   Neurological:  Negative for syncope.     Objective:   BP (!) 146/68 (BP Location: Left arm)   Pulse 72   Temp 97.6 °F (36.4 °C) (Temporal)   Resp 16   Ht 5' 7" (1.702 m)   Wt 72 kg (158 lb 12.8 oz)   SpO2 97%   BMI 24.87 kg/m²     Physical Exam  Vitals and nursing note reviewed.   Constitutional:       General: He is not in acute distress.     Appearance: Normal appearance. He is not ill-appearing, toxic-appearing or diaphoretic.   HENT:      Head: Normocephalic and atraumatic.   Eyes:      Conjunctiva/sclera: Conjunctivae normal.   Cardiovascular:      Rate and Rhythm: Normal rate and regular rhythm.      Heart sounds: Murmur heard.   Pulmonary:      Effort: Pulmonary effort is normal. No respiratory distress.      Breath sounds: Normal breath sounds. No wheezing.   Abdominal:      General: Bowel sounds are normal. There is no distension.      Palpations: Abdomen is soft.      Tenderness: There is no abdominal tenderness. "   Musculoskeletal:         General: No deformity.      Right lower leg: No edema.      Left lower leg: No edema.      Comments: Patient ambulates well with cane.   Skin:     General: Skin is warm and dry.      Findings: No lesion or rash.   Neurological:      General: No focal deficit present.      Mental Status: He is alert.      Motor: No weakness.      Coordination: Coordination normal.   Psychiatric:         Mood and Affect: Mood normal.         Behavior: Behavior normal.         Thought Content: Thought content normal.         Judgment: Judgment normal.       Assessment/Plan:   1. Acute on chronic congestive heart failure, unspecified heart failure type  Comments:  - Resolved.  - Hospital discharge summary reviewed.  - Keep follow-up with Cardiology.    2. Occult blood positive stool  Comments:  - Patient denies any signs of blood loss.  - Keep follow-up with GI.    3. Constipation, unspecified constipation type  -     docusate sodium (COLACE) 100 MG capsule; Take 1 capsule (100 mg total) by mouth 2 (two) times daily.  Dispense: 180 capsule; Refill: 0

## 2024-04-24 ENCOUNTER — DOCUMENT SCAN (OUTPATIENT)
Dept: HOME HEALTH SERVICES | Facility: HOSPITAL | Age: 84
End: 2024-04-24
Payer: MEDICARE

## 2024-04-25 ENCOUNTER — DOCUMENT SCAN (OUTPATIENT)
Dept: HOME HEALTH SERVICES | Facility: HOSPITAL | Age: 84
End: 2024-04-25
Payer: MEDICARE

## 2024-05-30 ENCOUNTER — OFFICE VISIT (OUTPATIENT)
Dept: FAMILY MEDICINE | Facility: CLINIC | Age: 84
End: 2024-05-30
Payer: MEDICARE

## 2024-05-30 VITALS
DIASTOLIC BLOOD PRESSURE: 72 MMHG | OXYGEN SATURATION: 98 % | HEIGHT: 67 IN | RESPIRATION RATE: 16 BRPM | WEIGHT: 162.5 LBS | SYSTOLIC BLOOD PRESSURE: 136 MMHG | TEMPERATURE: 98 F | HEART RATE: 64 BPM | BODY MASS INDEX: 25.51 KG/M2

## 2024-05-30 DIAGNOSIS — Z00.00 MEDICARE ANNUAL WELLNESS VISIT, SUBSEQUENT: Primary | ICD-10-CM

## 2024-05-30 DIAGNOSIS — I48.21 PERMANENT ATRIAL FIBRILLATION: Chronic | ICD-10-CM

## 2024-05-30 DIAGNOSIS — N18.2 CHRONIC KIDNEY DISEASE, STAGE 2 (MILD): Chronic | ICD-10-CM

## 2024-05-30 DIAGNOSIS — E78.2 MIXED HYPERLIPIDEMIA: Chronic | ICD-10-CM

## 2024-05-30 DIAGNOSIS — I10 PRIMARY HYPERTENSION: Chronic | ICD-10-CM

## 2024-05-30 PROBLEM — H26.9 CATARACT OF BOTH EYES: Status: RESOLVED | Noted: 2022-10-04 | Resolved: 2024-05-30

## 2024-05-30 PROBLEM — N30.01 ACUTE CYSTITIS WITH HEMATURIA: Status: RESOLVED | Noted: 2024-02-22 | Resolved: 2024-05-30

## 2024-05-30 PROBLEM — E87.1 HYPONATREMIA: Status: RESOLVED | Noted: 2022-05-09 | Resolved: 2024-05-30

## 2024-05-30 PROCEDURE — G0439 PPPS, SUBSEQ VISIT: HCPCS | Mod: ,,, | Performed by: STUDENT IN AN ORGANIZED HEALTH CARE EDUCATION/TRAINING PROGRAM

## 2024-05-30 NOTE — ASSESSMENT & PLAN NOTE
- Stable.  - Patient following with Dr. Michel Mendoza with Cardiology.  - Patient taking Aspirin, Amiodarone, Lasix, and Lipitor. Medication regimen per Cardiology.

## 2024-05-30 NOTE — ASSESSMENT & PLAN NOTE
- BP well-controlled.  - Patient following with Dr. Michel Mendoza with Cardiology.  - Patient taking Aspirin, Amiodarone, Lasix, and Lipitor. Medication regimen per Cardiology.

## 2024-05-30 NOTE — PROGRESS NOTES
"Subjective:      Patient ID: Garrison Garrett is a 83 y.o.  male. He is accompanied by his wife, Mrs. Nadia Garrett.    Chief Complaint: Medicare Wellness    A-Fib/HTN/HLD: Patient following with Dr. Michel Mendoza with Cardiology. Patient taking Aspirin, Amiodarone, Lasix, and Lipitor.     CKDII: Patient following with Dr. Nola Lincoln with Nephrology.    Review of Systems   Constitutional:  Negative for activity change, appetite change, chills, diaphoresis, fatigue, fever and unexpected weight change.   Eyes:  Negative for visual disturbance.   Respiratory:  Negative for apnea, cough, shortness of breath and wheezing.    Cardiovascular:  Negative for chest pain, palpitations and leg swelling.   Gastrointestinal:  Negative for abdominal pain, blood in stool, constipation, diarrhea, nausea and vomiting.   Genitourinary:  Negative for dysuria and hematuria.   Skin:  Negative for rash and wound.   Neurological:  Negative for dizziness, syncope, weakness, numbness and headaches.   Psychiatric/Behavioral:  Negative for behavioral problems, dysphoric mood and sleep disturbance. The patient is not nervous/anxious.      Objective:   /72 (BP Location: Left arm)   Pulse 64   Temp 98.4 °F (36.9 °C) (Temporal)   Resp 16   Ht 5' 7" (1.702 m)   Wt 73.7 kg (162 lb 8 oz)   SpO2 98%   BMI 25.45 kg/m²     Physical Exam  Vitals and nursing note reviewed.   Constitutional:       General: He is not in acute distress.     Appearance: Normal appearance. He is not ill-appearing or toxic-appearing.   HENT:      Head: Normocephalic and atraumatic.      Mouth/Throat:      Mouth: Mucous membranes are moist.      Pharynx: Oropharynx is clear.   Eyes:      Conjunctiva/sclera: Conjunctivae normal.   Cardiovascular:      Rate and Rhythm: Normal rate and regular rhythm.      Heart sounds: Murmur heard.   Pulmonary:      Effort: Pulmonary effort is normal. No respiratory distress.      Breath sounds: Normal breath sounds. " No wheezing.   Abdominal:      General: Bowel sounds are normal. There is no distension.      Palpations: Abdomen is soft.      Tenderness: There is no abdominal tenderness.   Musculoskeletal:         General: No deformity.      Cervical back: Neck supple. No tenderness.      Right lower leg: No edema.      Left lower leg: No edema.   Lymphadenopathy:      Cervical: No cervical adenopathy.   Skin:     General: Skin is warm and dry.      Findings: No lesion or rash.   Neurological:      General: No focal deficit present.      Mental Status: He is alert. Mental status is at baseline.      Motor: No weakness.      Coordination: Coordination normal.   Psychiatric:         Mood and Affect: Mood normal.         Behavior: Behavior normal.         Thought Content: Thought content normal.         Judgment: Judgment normal.       Assessment/Plan:   1. Medicare annual wellness visit, subsequent  Comments:  - Health maintenance reviewed.    2. Permanent atrial fibrillation  Assessment & Plan:  - Stable.  - Patient following with Dr. Michel Mendoza with Cardiology.  - Patient taking Aspirin, Amiodarone, Lasix, and Lipitor. Medication regimen per Cardiology.      3. Primary hypertension  Assessment & Plan:  - BP well-controlled.  - Patient following with Dr. Michel Mendoza with Cardiology.  - Patient taking Aspirin, Amiodarone, Lasix, and Lipitor. Medication regimen per Cardiology.      4. Mixed hyperlipidemia  Assessment & Plan:  - Patient following with Dr. Michel Mendoza with Cardiology.  - Continue Lipitor 40mg daily.      5. Chronic kidney disease, stage 2 (mild)  Assessment & Plan:  - Stable.  - Patient following with Dr. Nola Lincoln with Nephrology.   - Nephrology office visit from 04/01/24 reviewed.           Opioid Screening: Patient medication list reviewed, patient is not taking prescription opioids. Patient is not using additional opioids than prescribed. Patient is at low risk of substance abuse based on this  opioid use history.     Patient Reported Health Risk Assessment  What is your age?: 80 or older  Are you male or female?: Male  During the past four weeks, how much have you been bothered by emotional problems such as feeling anxious, depressed, irritable, sad, or downhearted and blue?: Not at all  During the past five weeks, has your physical and/or emotional health limited your social activities with family, friends, neighbors, or groups?: Not at all  During the past four weeks, how much bodily pain have you generally had?: Mild pain  During the past four weeks, was someone available to help if you needed and wanted help?: Yes, as much as I wanted  During the past four weeks, what was the hardest physical activity you could do for at least two minutes?: Moderate  Can you get to places out of walking distance without help?  (For example, can you travel alone on buses or taxis, or drive your own car?): Yes  Can you go shopping for groceries or clothes without someone's help?: Yes  Can you prepare your own meals?: Yes  Can you do your own housework without help?: Yes  Because of any health problems, do you need the help of another person with your personal care needs such as eating, bathing, dressing, or getting around the house?: No  Can you handle your own money without help?: Yes  During the past four weeks, how would you rate your health in general?: Good  How have things been going for you during the past four weeks?: Pretty well  Are you having difficulties driving your car?: No  Do you always fasten your seat belt when you are in a car?: Yes, usually  How often in the past four weeks have you been bothered by falling or dizzy when standing up?: Never  How often in the past four weeks have you been bothered by sexual problems?: Never  How often in the past four weeks have you been bothered by trouble eating well?: Never  How often in the past four weeks have you been bothered by teeth or denture problems?:  Never  How often in the past four weeks have you been bothered with problems using the telephone?: Never  How often in the past four weeks have you been bothered by tiredness or fatigue?: Never  Have you fallen two or more times in the past year?: No  Are you afraid of falling?: No  Are you a smoker?: No  During the past four weeks, how many drinks of wine, beer, or other alcoholic beverages did you have?: 2-5 drinks per weeks  Do you exercise for about 20 minutes three or more days a week?: Yes, some of the time  Have you been given any information to help you with hazards in your house that might hurt you?: Yes  Have you been given any information to help you with keeping track of your medications?: Yes  How often do you have trouble taking medicines the way you've been told to take them?: I always take them as prescribed  How confident are you that you can control and manage most of your health problems?: Somewhat confident  What is your race? (Check all that apply.):     Medicare Annual Wellness and Personalized Prevention Plan:   Fall Risk + Home Safety + Hearing Impairment + Depression Screen + Opioid and Substance Abuse Screening + Cognitive Impairment Screen + Health Risk Assessment all reviewed.     Advance Care Planning   I attest to discussing Advance Care Planning with patient and/or family member.  Education was provided including the importance of the Health Care Power of , Advance Directives, and/or [place] documentation.  The patient expressed understanding to the importance of this information and discussion.       Follow up in about 6 months (around 11/30/2024) for Chronic Medical Management.

## 2024-05-30 NOTE — ASSESSMENT & PLAN NOTE
- Stable.  - Patient following with Dr. Nola Lincoln with Nephrology.   - Nephrology office visit from 04/01/24 reviewed.

## 2024-06-18 ENCOUNTER — EXTERNAL HOME HEALTH (OUTPATIENT)
Dept: HOME HEALTH SERVICES | Facility: HOSPITAL | Age: 84
End: 2024-06-18
Payer: MEDICARE

## 2025-02-24 ENCOUNTER — OFFICE VISIT (OUTPATIENT)
Dept: FAMILY MEDICINE | Facility: CLINIC | Age: 85
End: 2025-02-24
Payer: MEDICARE

## 2025-02-24 VITALS
DIASTOLIC BLOOD PRESSURE: 80 MMHG | WEIGHT: 181.19 LBS | HEIGHT: 67 IN | HEART RATE: 74 BPM | BODY MASS INDEX: 28.44 KG/M2 | OXYGEN SATURATION: 98 % | SYSTOLIC BLOOD PRESSURE: 134 MMHG | RESPIRATION RATE: 16 BRPM | TEMPERATURE: 99 F

## 2025-02-24 DIAGNOSIS — N18.2 CHRONIC KIDNEY DISEASE, STAGE 2 (MILD): Chronic | ICD-10-CM

## 2025-02-24 DIAGNOSIS — K21.9 GASTROESOPHAGEAL REFLUX DISEASE, UNSPECIFIED WHETHER ESOPHAGITIS PRESENT: ICD-10-CM

## 2025-02-24 DIAGNOSIS — I48.21 PERMANENT ATRIAL FIBRILLATION: Primary | Chronic | ICD-10-CM

## 2025-02-24 DIAGNOSIS — Z00.00 MEDICARE ANNUAL WELLNESS VISIT, SUBSEQUENT: ICD-10-CM

## 2025-02-24 DIAGNOSIS — R73.9 HYPERGLYCEMIA: ICD-10-CM

## 2025-02-24 DIAGNOSIS — I10 PRIMARY HYPERTENSION: Chronic | ICD-10-CM

## 2025-02-24 DIAGNOSIS — E78.2 MIXED HYPERLIPIDEMIA: Chronic | ICD-10-CM

## 2025-02-24 PROBLEM — Q60.0 RENAL AGENESIS, UNILATERAL: Status: RESOLVED | Noted: 2021-02-04 | Resolved: 2025-02-24

## 2025-02-24 PROCEDURE — 3079F DIAST BP 80-89 MM HG: CPT | Mod: CPTII,,, | Performed by: STUDENT IN AN ORGANIZED HEALTH CARE EDUCATION/TRAINING PROGRAM

## 2025-02-24 PROCEDURE — 3075F SYST BP GE 130 - 139MM HG: CPT | Mod: CPTII,,, | Performed by: STUDENT IN AN ORGANIZED HEALTH CARE EDUCATION/TRAINING PROGRAM

## 2025-02-24 PROCEDURE — 1159F MED LIST DOCD IN RCRD: CPT | Mod: CPTII,,, | Performed by: STUDENT IN AN ORGANIZED HEALTH CARE EDUCATION/TRAINING PROGRAM

## 2025-02-24 PROCEDURE — 1101F PT FALLS ASSESS-DOCD LE1/YR: CPT | Mod: CPTII,,, | Performed by: STUDENT IN AN ORGANIZED HEALTH CARE EDUCATION/TRAINING PROGRAM

## 2025-02-24 PROCEDURE — 1126F AMNT PAIN NOTED NONE PRSNT: CPT | Mod: CPTII,,, | Performed by: STUDENT IN AN ORGANIZED HEALTH CARE EDUCATION/TRAINING PROGRAM

## 2025-02-24 PROCEDURE — 3288F FALL RISK ASSESSMENT DOCD: CPT | Mod: CPTII,,, | Performed by: STUDENT IN AN ORGANIZED HEALTH CARE EDUCATION/TRAINING PROGRAM

## 2025-02-24 PROCEDURE — 99214 OFFICE O/P EST MOD 30 MIN: CPT | Mod: ,,, | Performed by: STUDENT IN AN ORGANIZED HEALTH CARE EDUCATION/TRAINING PROGRAM

## 2025-02-24 RX ORDER — AMLODIPINE BESYLATE 2.5 MG/1
2.5 TABLET ORAL
COMMUNITY

## 2025-02-24 RX ORDER — PANTOPRAZOLE SODIUM 40 MG/1
40 TABLET, DELAYED RELEASE ORAL EVERY MORNING
Qty: 90 TABLET | Refills: 3 | Status: SHIPPED | OUTPATIENT
Start: 2025-02-24 | End: 2026-02-24

## 2025-02-24 NOTE — ASSESSMENT & PLAN NOTE
- BP well-controlled.  - Patient following with Dr. Michel Mendoza with Cardiology.  - Patient taking Aspirin, Amiodarone, Norvasc, Lasix, and Lipitor. Medication regimen per Cardiology.

## 2025-02-24 NOTE — ASSESSMENT & PLAN NOTE
- Stable.  - Patient following with Dr. Michel Mendoza with Cardiology.  - Patient taking Aspirin, Amiodarone, Norvasc, Lasix, and Lipitor. Medication regimen per Cardiology.

## 2025-02-24 NOTE — PROGRESS NOTES
"Subjective:      Patient ID: Garrison Garrett is a 84 y.o.  male. He's accompanied by his wife, Mrs. Nadia Garrett.    Chief Complaint: Chronic Medical Management    A-Fib/HTN/HLD: Patient following with Dr. Michel Mendoza with Cardiology. Patient taking Aspirin, Amiodarone, Amlodipine, Lasix, and Lipitor.     CKDII: Patient following with Dr. Nola Lincoln with Nephrology.    GERD: Patient taking Protonix.    Review of Systems   Constitutional:  Negative for activity change, appetite change, chills, diaphoresis, fatigue, fever and unexpected weight change.   Eyes:  Negative for visual disturbance.   Respiratory:  Negative for apnea, cough, shortness of breath and wheezing.    Cardiovascular:  Negative for chest pain, palpitations and leg swelling.   Gastrointestinal:  Negative for abdominal pain, blood in stool, constipation, diarrhea, nausea and vomiting.   Genitourinary:  Negative for dysuria and hematuria.   Skin:  Negative for rash and wound.   Neurological:  Negative for dizziness, syncope, weakness, numbness and headaches.   Psychiatric/Behavioral:  Negative for behavioral problems, dysphoric mood and sleep disturbance. The patient is not nervous/anxious.      Objective:   /80 (BP Location: Left arm, Patient Position: Sitting)   Pulse 74   Temp 98.5 °F (36.9 °C) (Temporal)   Resp 16   Ht 5' 7" (1.702 m)   Wt 82.2 kg (181 lb 3.2 oz)   SpO2 98%   BMI 28.38 kg/m²     Physical Exam  Vitals and nursing note reviewed.   Constitutional:       General: He is not in acute distress.     Appearance: Normal appearance. He is not ill-appearing or toxic-appearing.   HENT:      Head: Normocephalic and atraumatic.      Mouth/Throat:      Mouth: Mucous membranes are moist.      Pharynx: Oropharynx is clear.   Eyes:      Conjunctiva/sclera: Conjunctivae normal.   Cardiovascular:      Rate and Rhythm: Normal rate and regular rhythm.      Heart sounds: Murmur heard.   Pulmonary:      Effort: Pulmonary " effort is normal. No respiratory distress.      Breath sounds: Normal breath sounds. No wheezing.   Abdominal:      General: Bowel sounds are normal. There is no distension.      Palpations: Abdomen is soft.      Tenderness: There is no abdominal tenderness.   Musculoskeletal:         General: No deformity.      Cervical back: Neck supple. No tenderness.      Right lower leg: No edema.      Left lower leg: No edema.   Lymphadenopathy:      Cervical: No cervical adenopathy.   Skin:     General: Skin is warm and dry.      Findings: No lesion or rash.   Neurological:      General: No focal deficit present.      Mental Status: He is alert. Mental status is at baseline.      Motor: No weakness.      Coordination: Coordination normal.   Psychiatric:         Mood and Affect: Mood normal.         Behavior: Behavior normal.         Thought Content: Thought content normal.         Judgment: Judgment normal.       Assessment/Plan:   1. Permanent atrial fibrillation  Assessment & Plan:  - Stable.  - Patient following with Dr. Michel Mendoza with Cardiology.  - Patient taking Aspirin, Amiodarone, Norvasc, Lasix, and Lipitor. Medication regimen per Cardiology.    Orders:  -     CBC Auto Differential; Future; Expected date: 02/24/2025  -     Comprehensive Metabolic Panel; Future; Expected date: 02/24/2025  -     TSH; Future; Expected date: 02/24/2025    2. Primary hypertension  Assessment & Plan:  - BP well-controlled.  - Patient following with Dr. Michel Mendoza with Cardiology.  - Patient taking Aspirin, Amiodarone, Norvasc, Lasix, and Lipitor. Medication regimen per Cardiology.    Orders:  -     CBC Auto Differential; Future; Expected date: 02/24/2025  -     Comprehensive Metabolic Panel; Future; Expected date: 02/24/2025  -     TSH; Future; Expected date: 02/24/2025    3. Mixed hyperlipidemia  Assessment & Plan:  - Patient following with Dr. Michel Mendoza with Cardiology.  - Continue Lipitor 40mg daily.    Orders:  -      Lipid Panel; Future; Expected date: 02/24/2025  -     TSH; Future; Expected date: 02/24/2025    4. Chronic kidney disease, stage 2 (mild)  Assessment & Plan:  - Stable.  - Patient following with Dr. Nola Lincoln with Nephrology.     Orders:  -     TSH; Future; Expected date: 02/24/2025    5. Gastroesophageal reflux disease, unspecified whether esophagitis present  Assessment & Plan:  - Stable.  - Continue Protonix 40mg daily.    Orders:  -     pantoprazole (PROTONIX) 40 MG tablet; Take 1 tablet (40 mg total) by mouth every morning.  Dispense: 90 tablet; Refill: 3    6. Medicare annual wellness visit, subsequent  -     CBC Auto Differential; Future; Expected date: 02/24/2025  -     Comprehensive Metabolic Panel; Future; Expected date: 02/24/2025  -     Lipid Panel; Future; Expected date: 02/24/2025    7. Hyperglycemia  -     Hemoglobin A1C; Future; Expected date: 02/24/2025       Follow up in about 6 months (around 8/24/2025) for Medicare Wellness.

## 2025-05-12 ENCOUNTER — TELEPHONE (OUTPATIENT)
Dept: FAMILY MEDICINE | Facility: CLINIC | Age: 85
End: 2025-05-12
Payer: COMMERCIAL

## 2025-05-12 NOTE — TELEPHONE ENCOUNTER
Copied from CRM #[id number]. Topic: General Inquiry - Patient Advice  >> May 12, 2025 10:14 AM Sasha wrote:  .Who Called: Garrison Garrett    Caller is requesting assistance/information from provider's office.    Symptoms (please be specific):    How long has patient had these symptoms:    List of preferred pharmacies on file (remove unneeded): [unfilled]  If different, enter pharmacy into here including location and phone number:       Preferred Method of Contact: Phone Call  Patient's Preferred Phone Number on File: [phone]   Best Call Back Number, if different:  Additional Information: [facility], [facility]  [phone]    Verbal conf - and reasons for treatment  Ref # [id number]

## 2025-07-16 ENCOUNTER — TELEPHONE (OUTPATIENT)
Dept: FAMILY MEDICINE | Facility: CLINIC | Age: 85
End: 2025-07-16
Payer: COMMERCIAL

## 2025-07-16 DIAGNOSIS — N18.2 CHRONIC KIDNEY DISEASE, STAGE 2 (MILD): Chronic | ICD-10-CM

## 2025-07-16 DIAGNOSIS — Z00.00 MEDICARE ANNUAL WELLNESS VISIT, SUBSEQUENT: Primary | ICD-10-CM

## 2025-07-16 DIAGNOSIS — R73.9 HYPERGLYCEMIA: ICD-10-CM

## 2025-07-16 DIAGNOSIS — I10 PRIMARY HYPERTENSION: Chronic | ICD-10-CM

## 2025-07-16 DIAGNOSIS — E78.2 MIXED HYPERLIPIDEMIA: Chronic | ICD-10-CM

## 2025-07-16 NOTE — TELEPHONE ENCOUNTER
I have ordered the following labs. Please notify the patient.    Orders Placed This Encounter   Procedures    CBC Auto Differential     Standing Status:   Future     Expected Date:   7/16/2025     Expiration Date:   10/16/2025    Comprehensive Metabolic Panel     Standing Status:   Future     Expected Date:   7/16/2025     Expiration Date:   10/16/2025    Hemoglobin A1C     Standing Status:   Future     Expected Date:   7/16/2025     Expiration Date:   10/16/2025    Lipid Panel     Standing Status:   Future     Expected Date:   7/16/2025     Expiration Date:   10/16/2025    TSH     Standing Status:   Future     Expected Date:   7/16/2025     Expiration Date:   10/16/2025

## 2025-07-16 NOTE — TELEPHONE ENCOUNTER
Copied from CRM #[id number]. Topic: General Inquiry - Patient Advice  >> Jul 16, 2025 12:48 PM Mike Tejeda wrote:  Who Called: Garrison Garrett    What order is the patient requesting: Wellness lab orders   When does the expect the orders to be performed? Before oct appt        Preferred Method of Contact: Phone Call  Patient's Preferred Phone Number on File: [phone]   Best Call Back Number, if different:  Additional Information: pt r/s original appt from 8/28 to 10/29 due to date conflict w/ another appt. Pt informed Dr. Hollis leaving in September . Pt said he's ok w/ wellness visit being w/ new provider. Pt stated he'd like to pick lab orders up from clinic. Pt would like a call back once orders are printed.

## (undated) DEVICE — GAUZE SPONGE 4X4 12PLY

## (undated) DEVICE — CATH IMPULSE 5FR PIGTAIL 125CM

## (undated) DEVICE — SHEATH CHECK FLO 18FRX40CM

## (undated) DEVICE — DILATOR VESSEL 10FR X 20CM

## (undated) DEVICE — SET INTRO PERFRMR SHTH 16FR

## (undated) DEVICE — INTRODUCER SWARTZ SL0 8.5FR

## (undated) DEVICE — PAD DEFIB RADIOLUCENT ADULT

## (undated) DEVICE — DILATOR RADIOPAQUE JCD 12.0F

## (undated) DEVICE — FORCEP HEMOSTAT MOSQUITO STR

## (undated) DEVICE — DILATOR VESSELL 8.0-38

## (undated) DEVICE — Device

## (undated) DEVICE — SHEATH DEL TORQVUE 14FR 8OCM

## (undated) DEVICE — NDL NRG TRNSPTAL 71CM

## (undated) DEVICE — WIRE PGTL FLX SPRL  .025X230CM

## (undated) DEVICE — SET EXT NAMIC ARTERIAL 12IN

## (undated) DEVICE — SUT ETHBND XTRA 1 OS-8 30IN

## (undated) DEVICE — STOPCOCK 3-WAY